# Patient Record
Sex: FEMALE | Race: WHITE | NOT HISPANIC OR LATINO | Employment: FULL TIME | URBAN - METROPOLITAN AREA
[De-identification: names, ages, dates, MRNs, and addresses within clinical notes are randomized per-mention and may not be internally consistent; named-entity substitution may affect disease eponyms.]

---

## 2023-03-21 LAB — PROGESTERONE (NG/ML) IN SER/PLAS: 9.6 NG/ML

## 2023-03-27 LAB — CHORIOGONADOTROPIN (MIU/ML) IN SER/PLAS: 395 MIU/ML

## 2023-03-29 LAB — CHORIOGONADOTROPIN (MIU/ML) IN SER/PLAS: 684 MIU/ML

## 2023-03-31 LAB — CHORIOGONADOTROPIN (MIU/ML) IN SER/PLAS: 1678 MIU/ML

## 2023-04-24 ENCOUNTER — OFFICE VISIT (OUTPATIENT)
Dept: PRIMARY CARE | Facility: CLINIC | Age: 33
End: 2023-04-24
Payer: COMMERCIAL

## 2023-04-24 VITALS
OXYGEN SATURATION: 96 % | DIASTOLIC BLOOD PRESSURE: 70 MMHG | SYSTOLIC BLOOD PRESSURE: 100 MMHG | BODY MASS INDEX: 27.04 KG/M2 | TEMPERATURE: 97.1 F | RESPIRATION RATE: 16 BRPM | HEART RATE: 85 BPM | HEIGHT: 64 IN | WEIGHT: 158.4 LBS

## 2023-04-24 DIAGNOSIS — H61.23 BILATERAL IMPACTED CERUMEN: Primary | ICD-10-CM

## 2023-04-24 PROCEDURE — 99214 OFFICE O/P EST MOD 30 MIN: CPT | Performed by: NURSE PRACTITIONER

## 2023-04-24 RX ORDER — GOLIMUMAB 100 MG/ML
100 INJECTION, SOLUTION SUBCUTANEOUS
COMMUNITY
Start: 2016-11-09

## 2023-04-24 NOTE — PROGRESS NOTES
"Subjective   Patient ID: Fang Carvalho is a 32 y.o. female who presents for Hearing Loss (Patient has a hard time hearing ears feel like there is something inside them. Patient tried a bit of oil, but has not done it since she is pregnant. Patient states symptoms began a week ago. And since then it has gotten progressively worse.).    HPI   Patient presents today for hearing loss-   Patient believes wax are occluding ears-   Patient has tried oil to help with no avail     Review of Systems   HENT:  Positive for ear pain and hearing loss.        Objective   /70 (BP Location: Left arm, Patient Position: Sitting, BP Cuff Size: Child)   Pulse 85   Temp 36.2 °C (97.1 °F)   Resp 16   Ht 1.626 m (5' 4\")   Wt 71.8 kg (158 lb 6.4 oz)   SpO2 96%   BMI 27.19 kg/m²     Physical Exam  Constitutional:       Appearance: Normal appearance.   HENT:      Right Ear: There is impacted cerumen.      Left Ear: There is impacted cerumen.   Cardiovascular:      Rate and Rhythm: Normal rate and regular rhythm.   Pulmonary:      Effort: Pulmonary effort is normal.      Breath sounds: Normal breath sounds.   Neurological:      Mental Status: She is alert.         Assessment/Plan          "

## 2023-04-26 NOTE — PATIENT INSTRUCTIONS
Patient presents today for bilateral hearing loss due to cerumen impaction-    Ears lavaged in office   Patient has improvement in hearing   Pt advised to avoid q-tips   all questions answered  follow up in office if signs or symptoms are worsening, not improving  or  please schedule follow up with PCP   if shortness of breath and or chest pain seek emergency department   24 hour hotline telephone numbers  Suicide or mental health mobile crisis help line [phone]  Child Abuse or neglect [phone]  Elder Abuse or neglect [phone]  Rape Crisis [phone]  Domestic Violence [phone]  Narcotics Anonymous [phone]

## 2023-04-28 LAB
CHLAMYDIA TRACH., AMPLIFIED: NEGATIVE
N. GONORRHEA, AMPLIFIED: NEGATIVE
URINE CULTURE: NORMAL

## 2023-05-08 LAB
ABO GROUP (TYPE) IN BLOOD: NORMAL
ANTIBODY SCREEN: NORMAL
CALCIDIOL (25 OH VITAMIN D3) (NG/ML) IN SER/PLAS: 34 NG/ML
ERYTHROCYTE DISTRIBUTION WIDTH (RATIO) BY AUTOMATED COUNT: 13.2 % (ref 11.5–14.5)
ERYTHROCYTE MEAN CORPUSCULAR HEMOGLOBIN CONCENTRATION (G/DL) BY AUTOMATED: 33.3 G/DL (ref 32–36)
ERYTHROCYTE MEAN CORPUSCULAR VOLUME (FL) BY AUTOMATED COUNT: 88 FL (ref 80–100)
ERYTHROCYTES (10*6/UL) IN BLOOD BY AUTOMATED COUNT: 4.23 X10E12/L (ref 4–5.2)
HEMATOCRIT (%) IN BLOOD BY AUTOMATED COUNT: 37.2 % (ref 36–46)
HEMOGLOBIN (G/DL) IN BLOOD: 12.4 G/DL (ref 12–16)
HEPATITIS B VIRUS SURFACE AG PRESENCE IN SERUM: NONREACTIVE
HEPATITIS C VIRUS AB PRESENCE IN SERUM: NONREACTIVE
HIV 1/ 2 AG/AB SCREEN: NONREACTIVE
LEUKOCYTES (10*3/UL) IN BLOOD BY AUTOMATED COUNT: 10 X10E9/L (ref 4.4–11.3)
PLATELETS (10*3/UL) IN BLOOD AUTOMATED COUNT: 242 X10E9/L (ref 150–450)
REFLEX ADDED, ANEMIA PANEL: NORMAL
RH FACTOR: NORMAL
SYPHILIS TOTAL AB: NONREACTIVE
THYROTROPIN (MIU/L) IN SER/PLAS BY DETECTION LIMIT <= 0.05 MIU/L: 0.03 MIU/L (ref 0.44–3.98)
THYROXINE (T4) FREE (NG/DL) IN SER/PLAS: 0.91 NG/DL (ref 0.61–1.12)

## 2023-05-09 LAB
HEMOGLOBIN A2: 3.4 %
HEMOGLOBIN A: 96 %
HEMOGLOBIN F: 0.6 %
HEMOGLOBIN IDENTIFICATION INTERPRETATION: NORMAL
PATH REVIEW-HGB IDENTIFICATION: NORMAL

## 2023-05-11 LAB — RUBELLA VIRUS IGG AB: NEGATIVE

## 2023-06-03 LAB
THYROTROPIN (MIU/L) IN SER/PLAS BY DETECTION LIMIT <= 0.05 MIU/L: 0.23 MIU/L (ref 0.44–3.98)
THYROXINE (T4) FREE (NG/DL) IN SER/PLAS: 0.88 NG/DL (ref 0.61–1.12)

## 2023-06-22 LAB — THYROTROPIN (MIU/L) IN SER/PLAS BY DETECTION LIMIT <= 0.05 MIU/L: 0.6 MIU/L (ref 0.44–3.98)

## 2023-07-21 LAB
CLUE CELLS: ABNORMAL
NUGENT SCORE: 7
YEAST: PRESENT

## 2023-08-15 LAB
CALCIDIOL (25 OH VITAMIN D3) (NG/ML) IN SER/PLAS: 33 NG/ML
ERYTHROCYTE DISTRIBUTION WIDTH (RATIO) BY AUTOMATED COUNT: 13.5 % (ref 11.5–14.5)
ERYTHROCYTE MEAN CORPUSCULAR HEMOGLOBIN CONCENTRATION (G/DL) BY AUTOMATED: 33.2 G/DL (ref 32–36)
ERYTHROCYTE MEAN CORPUSCULAR VOLUME (FL) BY AUTOMATED COUNT: 89 FL (ref 80–100)
ERYTHROCYTES (10*6/UL) IN BLOOD BY AUTOMATED COUNT: 4.19 X10E12/L (ref 4–5.2)
GLUCOSE, 1 HR SCREEN, PREG: 86 MG/DL
HEMATOCRIT (%) IN BLOOD BY AUTOMATED COUNT: 37.4 % (ref 36–46)
HEMOGLOBIN (G/DL) IN BLOOD: 12.4 G/DL (ref 12–16)
LEUKOCYTES (10*3/UL) IN BLOOD BY AUTOMATED COUNT: 13.3 X10E9/L (ref 4.4–11.3)
PLATELETS (10*3/UL) IN BLOOD AUTOMATED COUNT: 204 X10E9/L (ref 150–450)
REFLEX ADDED, ANEMIA PANEL: ABNORMAL
SYPHILIS TOTAL AB: NONREACTIVE

## 2023-08-23 LAB — FUNGAL SCREEN, YEAST: NORMAL

## 2023-09-27 ENCOUNTER — APPOINTMENT (OUTPATIENT)
Dept: PEDIATRICS | Facility: CLINIC | Age: 33
End: 2023-09-27
Payer: COMMERCIAL

## 2023-10-12 ENCOUNTER — ROUTINE PRENATAL (OUTPATIENT)
Dept: OBSTETRICS AND GYNECOLOGY | Facility: CLINIC | Age: 33
End: 2023-10-12
Payer: COMMERCIAL

## 2023-10-12 VITALS — DIASTOLIC BLOOD PRESSURE: 70 MMHG | BODY MASS INDEX: 31.07 KG/M2 | WEIGHT: 181 LBS | SYSTOLIC BLOOD PRESSURE: 110 MMHG

## 2023-10-12 DIAGNOSIS — N89.8 VAGINAL ITCHING: ICD-10-CM

## 2023-10-12 DIAGNOSIS — M45.9 ANKYLOSING SPONDYLITIS, UNSPECIFIED SITE OF SPINE (MULTI): ICD-10-CM

## 2023-10-12 DIAGNOSIS — M08.80 POLYARTICULAR JUVENILE IDIOPATHIC ARTHRITIS (MULTI): ICD-10-CM

## 2023-10-12 DIAGNOSIS — O23.593 VAGINITIS AFFECTING PREGNANCY IN THIRD TRIMESTER, ANTEPARTUM (HHS-HCC): ICD-10-CM

## 2023-10-12 DIAGNOSIS — E03.9 HYPOTHYROIDISM DURING PREGNANCY, ANTEPARTUM (HHS-HCC): Primary | ICD-10-CM

## 2023-10-12 DIAGNOSIS — O99.280 HYPOTHYROIDISM DURING PREGNANCY, ANTEPARTUM (HHS-HCC): Primary | ICD-10-CM

## 2023-10-12 DIAGNOSIS — F31.81: ICD-10-CM

## 2023-10-12 PROBLEM — E28.2 PCOS (POLYCYSTIC OVARIAN SYNDROME): Status: ACTIVE | Noted: 2023-10-12

## 2023-10-12 PROBLEM — Z15.89 HLA B27 POSITIVE: Status: ACTIVE | Noted: 2023-10-12

## 2023-10-12 PROBLEM — K21.9 GASTROESOPHAGEAL REFLUX DISEASE WITHOUT ESOPHAGITIS: Status: ACTIVE | Noted: 2023-10-12

## 2023-10-12 PROBLEM — O09.90 HIGH RISK PREGNANCY, ANTEPARTUM (HHS-HCC): Status: ACTIVE | Noted: 2023-10-12

## 2023-10-12 PROBLEM — F41.9 ANXIETY DISORDER: Status: ACTIVE | Noted: 2017-02-09

## 2023-10-12 PROBLEM — E55.9 VITAMIN D DEFICIENCY, UNSPECIFIED: Status: ACTIVE | Noted: 2023-10-12

## 2023-10-12 PROCEDURE — 0501F PRENATAL FLOW SHEET: CPT | Performed by: OBSTETRICS & GYNECOLOGY

## 2023-10-12 PROCEDURE — 87205 SMEAR GRAM STAIN: CPT

## 2023-10-12 RX ORDER — LANOLIN ALCOHOL/MO/W.PET/CERES
CREAM (GRAM) TOPICAL
Status: ON HOLD | COMMUNITY
Start: 2023-04-27 | End: 2023-10-18 | Stop reason: WASHOUT

## 2023-10-12 RX ORDER — HYDROCORTISONE 25 MG/G
OINTMENT TOPICAL
Status: ON HOLD | COMMUNITY
Start: 2023-08-15 | End: 2023-10-18 | Stop reason: WASHOUT

## 2023-10-12 RX ORDER — FAMOTIDINE 20 MG/1
1 TABLET, FILM COATED ORAL DAILY
COMMUNITY
Start: 2023-09-06 | End: 2023-11-09 | Stop reason: ALTCHOICE

## 2023-10-12 RX ORDER — B-COMPLEX WITH VITAMIN C
1 TABLET ORAL DAILY
COMMUNITY
Start: 2023-04-27 | End: 2023-12-20 | Stop reason: ALTCHOICE

## 2023-10-12 RX ORDER — VIT C/E/ZN/COPPR/LUTEIN/ZEAXAN 250MG-90MG
1 CAPSULE ORAL DAILY
Status: ON HOLD | COMMUNITY
Start: 2023-04-27 | End: 2023-10-18 | Stop reason: WASHOUT

## 2023-10-12 RX ORDER — DOXYLAMINE SUCCINATE 25 MG/1
TABLET ORAL
Status: ON HOLD | COMMUNITY
Start: 2023-04-27 | End: 2023-10-18 | Stop reason: WASHOUT

## 2023-10-12 RX ORDER — FLUCONAZOLE 150 MG/1
150 TABLET ORAL ONCE
Qty: 1 TABLET | Refills: 0 | Status: SHIPPED | OUTPATIENT
Start: 2023-10-12 | End: 2023-10-18 | Stop reason: HOSPADM

## 2023-10-12 NOTE — PROGRESS NOTES
Subjective   Patient ID [id number]   Fang Carvalho is a 33 y.o.  at 33w0d with a working estimated date of delivery of 2023, by Ultrasound who presents for a routine prenatal visit. She denies vaginal bleeding, leakage of fluid, decreased fetal movements, or contractions.    Pelvic pain improved with pregnancy belt. Endorsing itching on thighs and vulva, previously treated with Monistat but has recurred.     Objective   Physical Exam:   Weight: 82.1 kg (181 lb)  BP: 110/70  Fetal Heart Rate: 150 Fundal Height (cm): 33 cm  SSE: mild, opaque, clumpy white discharge  Skin: mild erythema of inner thighs     Assessment/Plan   Problem List Items Addressed This Visit             ICD-10-CM    Ankylosing spondylitis (CMS/[facility]) M45.9    Bipolar II disorder, severe, depressed, with mixed features, in full remission (CMS/[facility]) F31.81    Hypothyroidism during pregnancy, antepartum - Primary O99.280, E03.9     Continue levothyroxine         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  Letter written for patient's  to be able to continue virtual work from home while taking care of .    Follow up in 2 weeks for a routine prenatal visit.    Seen and discussed with Dr. Libby Sims MD

## 2023-10-13 NOTE — PROGRESS NOTES
I saw and evaluated the patient. I personally obtained the key and critical portions of the history and physical exam or was physically present for key and critical portions performed by the resident/fellow. I reviewed the resident/fellow's documentation and discussed the patient with the resident/fellow. I agree with the resident/fellow's medical decision making as documented in the note.    Chantel Souza MD

## 2023-10-15 LAB
CLUE CELLS VAG LPF-#/AREA: NORMAL /[LPF]
NUGENT SCORE: 0
YEAST VAG WET PREP-#/AREA: NORMAL

## 2023-10-18 ENCOUNTER — HOSPITAL ENCOUNTER (OUTPATIENT)
Facility: HOSPITAL | Age: 33
End: 2023-10-18
Attending: STUDENT IN AN ORGANIZED HEALTH CARE EDUCATION/TRAINING PROGRAM | Admitting: STUDENT IN AN ORGANIZED HEALTH CARE EDUCATION/TRAINING PROGRAM
Payer: COMMERCIAL

## 2023-10-18 ENCOUNTER — TELEPHONE (OUTPATIENT)
Dept: OBSTETRICS AND GYNECOLOGY | Facility: CLINIC | Age: 33
End: 2023-10-18
Payer: COMMERCIAL

## 2023-10-18 ENCOUNTER — HOSPITAL ENCOUNTER (OUTPATIENT)
Facility: HOSPITAL | Age: 33
Discharge: HOME | End: 2023-10-18
Attending: STUDENT IN AN ORGANIZED HEALTH CARE EDUCATION/TRAINING PROGRAM | Admitting: STUDENT IN AN ORGANIZED HEALTH CARE EDUCATION/TRAINING PROGRAM
Payer: COMMERCIAL

## 2023-10-18 VITALS
SYSTOLIC BLOOD PRESSURE: 120 MMHG | TEMPERATURE: 98.1 F | OXYGEN SATURATION: 98 % | HEART RATE: 78 BPM | BODY MASS INDEX: 31.73 KG/M2 | WEIGHT: 185.85 LBS | RESPIRATION RATE: 16 BRPM | DIASTOLIC BLOOD PRESSURE: 74 MMHG | HEIGHT: 64 IN

## 2023-10-18 PROCEDURE — 59025 FETAL NON-STRESS TEST: CPT

## 2023-10-18 PROCEDURE — 99214 OFFICE O/P EST MOD 30 MIN: CPT | Mod: 25

## 2023-10-18 PROCEDURE — 99214 OFFICE O/P EST MOD 30 MIN: CPT

## 2023-10-18 SDOH — HEALTH STABILITY: MENTAL HEALTH: NON-SPECIFIC ACTIVE SUICIDAL THOUGHTS (PAST 1 MONTH): NO

## 2023-10-18 SDOH — SOCIAL STABILITY: SOCIAL INSECURITY: HAS ANYONE EVER THREATENED TO HURT YOUR FAMILY OR YOUR PETS?: NO

## 2023-10-18 SDOH — SOCIAL STABILITY: SOCIAL INSECURITY: ABUSE SCREEN: ADULT

## 2023-10-18 SDOH — SOCIAL STABILITY: SOCIAL INSECURITY: ARE THERE ANY APPARENT SIGNS OF INJURIES/BEHAVIORS THAT COULD BE RELATED TO ABUSE/NEGLECT?: NO

## 2023-10-18 SDOH — HEALTH STABILITY: MENTAL HEALTH: WERE YOU ABLE TO COMPLETE ALL THE BEHAVIORAL HEALTH SCREENINGS?: YES

## 2023-10-18 SDOH — ECONOMIC STABILITY: HOUSING INSECURITY: DO YOU FEEL UNSAFE GOING BACK TO THE PLACE WHERE YOU ARE LIVING?: NO

## 2023-10-18 SDOH — SOCIAL STABILITY: SOCIAL INSECURITY: DOES ANYONE TRY TO KEEP YOU FROM HAVING/CONTACTING OTHER FRIENDS OR DOING THINGS OUTSIDE YOUR HOME?: NO

## 2023-10-18 SDOH — HEALTH STABILITY: MENTAL HEALTH: WISH TO BE DEAD (PAST 1 MONTH): NO

## 2023-10-18 SDOH — SOCIAL STABILITY: SOCIAL INSECURITY: HAVE YOU HAD THOUGHTS OF HARMING ANYONE ELSE?: NO

## 2023-10-18 SDOH — SOCIAL STABILITY: SOCIAL INSECURITY: DO YOU FEEL ANYONE HAS EXPLOITED OR TAKEN ADVANTAGE OF YOU FINANCIALLY OR OF YOUR PERSONAL PROPERTY?: NO

## 2023-10-18 SDOH — HEALTH STABILITY: MENTAL HEALTH: SUICIDAL BEHAVIOR (LIFETIME): NO

## 2023-10-18 SDOH — SOCIAL STABILITY: SOCIAL INSECURITY: VERBAL ABUSE: DENIES

## 2023-10-18 SDOH — SOCIAL STABILITY: SOCIAL INSECURITY: ARE YOU OR HAVE YOU BEEN THREATENED OR ABUSED PHYSICALLY, EMOTIONALLY, OR SEXUALLY BY ANYONE?: NO

## 2023-10-18 SDOH — SOCIAL STABILITY: SOCIAL INSECURITY: PHYSICAL ABUSE: DENIES

## 2023-10-18 ASSESSMENT — PAIN SCALES - GENERAL: PAINLEVEL_OUTOF10: 0 - NO PAIN

## 2023-10-18 ASSESSMENT — LIFESTYLE VARIABLES
HOW OFTEN DO YOU HAVE A DRINK CONTAINING ALCOHOL: NEVER
SKIP TO QUESTIONS 9-10: 1
HOW MANY STANDARD DRINKS CONTAINING ALCOHOL DO YOU HAVE ON A TYPICAL DAY: PATIENT DOES NOT DRINK
AUDIT-C TOTAL SCORE: 0
AUDIT-C TOTAL SCORE: 0
HOW OFTEN DO YOU HAVE 6 OR MORE DRINKS ON ONE OCCASION: NEVER

## 2023-10-18 ASSESSMENT — PATIENT HEALTH QUESTIONNAIRE - PHQ9
1. LITTLE INTEREST OR PLEASURE IN DOING THINGS: NOT AT ALL
2. FEELING DOWN, DEPRESSED OR HOPELESS: NOT AT ALL
SUM OF ALL RESPONSES TO PHQ9 QUESTIONS 1 & 2: 0

## 2023-10-18 NOTE — TELEPHONE ENCOUNTER
"Pt is 33.6 wks. She called stating that she has not felt much FM since bedtime last night \"and it was decreased then.\" She has had upper abdominal and lower rib cage pain. Pain is over all of area, not just RUQ. Denies LOF, ctx and bleeding. Pt said she monitored fetal movements. She usually feels the baby move a lot in 30 min. Pt states that it took about 2 hrs to feel the normal amt of movement last night. This morning pt has eaten and hydrated. She states that FM is still less than normal. Pt was advised to go to L&D for assessment.   "

## 2023-10-20 ENCOUNTER — TELEPHONE (OUTPATIENT)
Dept: OBSTETRICS AND GYNECOLOGY | Facility: CLINIC | Age: 33
End: 2023-10-20
Payer: COMMERCIAL

## 2023-10-20 NOTE — TELEPHONE ENCOUNTER
Pt contacted.       Pt c/o sporadic ctx throughout the day yesterday.   Not painful or lasting more than 30- secs.    Was seen day before that on L&D.   No PTL noted.   Pt advised to increase fluids today, rest and elevate legs.   S/s active labor discussed.   Understanding voiced.

## 2023-10-23 ENCOUNTER — TELEPHONE (OUTPATIENT)
Dept: OBSTETRICS AND GYNECOLOGY | Facility: CLINIC | Age: 33
End: 2023-10-23
Payer: COMMERCIAL

## 2023-10-23 NOTE — TELEPHONE ENCOUNTER
"Pt is 34.4 wks. She called stating that she has been having regular contractions every 10 min lasting about 1 min since 9am this morning.  FM is WNL. Denies LOF Pt has eaten and hydrated well today. She gets some relief when laying down. She is a  and sits at a desk most of the day. It is getting \"uncomfortable \"for her today. Reviewed balancing rest with activity and laying on Left side frequently if she is able. Also discussed going to L&D for assessment. Pt has a PN appt 10/26/23 and is asking to be seen sooner. She was added on to Dr. Gipson's schedule tomorrow morning. Pt said that she will go to L&D if sx continue or worsen and are not relieved by laying down. She understands TCB 24/7 with questions.   "

## 2023-10-24 ENCOUNTER — ROUTINE PRENATAL (OUTPATIENT)
Dept: OBSTETRICS AND GYNECOLOGY | Facility: CLINIC | Age: 33
End: 2023-10-24
Payer: COMMERCIAL

## 2023-10-24 VITALS — BODY MASS INDEX: 31.41 KG/M2 | WEIGHT: 183 LBS | SYSTOLIC BLOOD PRESSURE: 118 MMHG | DIASTOLIC BLOOD PRESSURE: 64 MMHG

## 2023-10-24 DIAGNOSIS — O23.593 VAGINITIS AFFECTING PREGNANCY IN THIRD TRIMESTER, ANTEPARTUM (HHS-HCC): ICD-10-CM

## 2023-10-24 DIAGNOSIS — O47.9 UTERINE CONTRACTIONS (HHS-HCC): ICD-10-CM

## 2023-10-24 DIAGNOSIS — O09.90 HIGH RISK PREGNANCY, ANTEPARTUM (HHS-HCC): ICD-10-CM

## 2023-10-24 DIAGNOSIS — O99.280 HYPOTHYROIDISM DURING PREGNANCY, ANTEPARTUM (HHS-HCC): ICD-10-CM

## 2023-10-24 DIAGNOSIS — E03.9 HYPOTHYROIDISM DURING PREGNANCY, ANTEPARTUM (HHS-HCC): ICD-10-CM

## 2023-10-24 PROCEDURE — 0501F PRENATAL FLOW SHEET: CPT | Performed by: OBSTETRICS & GYNECOLOGY

## 2023-10-24 PROCEDURE — 59025 FETAL NON-STRESS TEST: CPT | Performed by: OBSTETRICS & GYNECOLOGY

## 2023-10-24 NOTE — PROCEDURES
Fang Carvalho, a  at 34w5d with an ALEXANDER of 2023, by Ultrasound, was seen at [facility] in [place] for a nonstress test.    Non-Stress Test   Baseline Fetal Heart Rate for Non-Stress Test: 160 BPM  Variability in Waveform for Non-Stress Test: Moderate  Accelerations in Non-Stress Test: Yes, greater than/equal to 15 bpm  Decelerations in Non-Stress Test: None  Contractions in Non-Stress Test: Irregular  Acoustic Stimulator for Non-Stress Test: Yes  Interpretation of Non-Stress Test   Interpretation of Non-Stress Test: Reactive

## 2023-10-24 NOTE — PROGRESS NOTES
Patient patient was seen and labor and delivery last Tuesday for contractions  NST unremarkable  Cervix was noted to be 1 and 50%  She called the office yesterday and was having increasing contractions  Good fetal movement, no bleeding  Chart reviewed and labs up-to-date    Ankylosing spondylitis stable    Cervix slightly more dilated today at 2 and 80%, vertex, 0 station  Patient reassured that although she is making cervical change we would not make attempts to stop her labor at this point.  We will do NST today and continue with weekly visits  Patient to call for any ROM, concerning bleeding, or decreased fetal movement

## 2023-10-26 ENCOUNTER — APPOINTMENT (OUTPATIENT)
Dept: OBSTETRICS AND GYNECOLOGY | Facility: CLINIC | Age: 33
End: 2023-10-26
Payer: COMMERCIAL

## 2023-10-31 ENCOUNTER — ROUTINE PRENATAL (OUTPATIENT)
Dept: OBSTETRICS AND GYNECOLOGY | Facility: CLINIC | Age: 33
End: 2023-10-31
Payer: COMMERCIAL

## 2023-10-31 VITALS — BODY MASS INDEX: 31.41 KG/M2 | SYSTOLIC BLOOD PRESSURE: 118 MMHG | DIASTOLIC BLOOD PRESSURE: 62 MMHG | WEIGHT: 183 LBS

## 2023-10-31 DIAGNOSIS — E03.9 HYPOTHYROIDISM DURING PREGNANCY, ANTEPARTUM (HHS-HCC): ICD-10-CM

## 2023-10-31 DIAGNOSIS — O99.280 HYPOTHYROIDISM DURING PREGNANCY, ANTEPARTUM (HHS-HCC): ICD-10-CM

## 2023-10-31 DIAGNOSIS — O23.593 VAGINITIS AFFECTING PREGNANCY IN THIRD TRIMESTER, ANTEPARTUM (HHS-HCC): ICD-10-CM

## 2023-10-31 DIAGNOSIS — O09.90 HIGH RISK PREGNANCY, ANTEPARTUM (HHS-HCC): ICD-10-CM

## 2023-10-31 PROCEDURE — 0501F PRENATAL FLOW SHEET: CPT | Performed by: OBSTETRICS & GYNECOLOGY

## 2023-10-31 NOTE — LETTER
To Whom it may concern,    It is my recommendation that my patient Fang work from home for the remainder of her pregnancy.  If you have any questions or concerns please contact my office.  [phone]        Thank You,  Silvia Gipson MD

## 2023-11-02 ENCOUNTER — TELEPHONE (OUTPATIENT)
Dept: OBSTETRICS AND GYNECOLOGY | Facility: CLINIC | Age: 33
End: 2023-11-02
Payer: COMMERCIAL

## 2023-11-02 NOTE — TELEPHONE ENCOUNTER
Pt contacted.     Has been taking pepsi and liquid mylanta for heart burn.    Usually works.     Last night she endorses vomiting after eating chips and salsa x1.        Feels ok today.  Discussed eating more bland foods in the evening and can try taking omeprazole.

## 2023-11-06 ENCOUNTER — TELEPHONE (OUTPATIENT)
Dept: OBSTETRICS AND GYNECOLOGY | Facility: CLINIC | Age: 33
End: 2023-11-06
Payer: COMMERCIAL

## 2023-11-06 NOTE — TELEPHONE ENCOUNTER
"Pt is 36.4 wks. Next PN appt is 11/9/23. Pt states that she has had lower back discomfort \"like a spasm that won't release\" on and off since last night. It moves to her abd when she sits up. FM WNL, no lof or ctx. She has been using a heating pad, massage to the area and resting today. She has not tried tylenol d/t recent heartburn and does not want that to come back. Discussed tylenol, hydration balancing rest with activity, laying on left side and stretching. Enc wearing maternity support band. Pt will monitor and CB 24/7 with questions or worsening sx.   "

## 2023-11-09 ENCOUNTER — ROUTINE PRENATAL (OUTPATIENT)
Dept: OBSTETRICS AND GYNECOLOGY | Facility: CLINIC | Age: 33
End: 2023-11-09
Payer: COMMERCIAL

## 2023-11-09 VITALS — SYSTOLIC BLOOD PRESSURE: 112 MMHG | DIASTOLIC BLOOD PRESSURE: 70 MMHG | WEIGHT: 184 LBS | BODY MASS INDEX: 31.58 KG/M2

## 2023-11-09 DIAGNOSIS — O99.280 HYPOTHYROIDISM DURING PREGNANCY, ANTEPARTUM (HHS-HCC): ICD-10-CM

## 2023-11-09 DIAGNOSIS — O23.593 VAGINITIS AFFECTING PREGNANCY IN THIRD TRIMESTER, ANTEPARTUM (HHS-HCC): ICD-10-CM

## 2023-11-09 DIAGNOSIS — O09.90 HIGH RISK PREGNANCY, ANTEPARTUM (HHS-HCC): ICD-10-CM

## 2023-11-09 DIAGNOSIS — E03.9 HYPOTHYROIDISM DURING PREGNANCY, ANTEPARTUM (HHS-HCC): ICD-10-CM

## 2023-11-09 PROCEDURE — 87081 CULTURE SCREEN ONLY: CPT

## 2023-11-09 PROCEDURE — 0501F PRENATAL FLOW SHEET: CPT | Performed by: OBSTETRICS & GYNECOLOGY

## 2023-11-09 RX ORDER — OMEPRAZOLE 20 MG/1
20 TABLET, DELAYED RELEASE ORAL
COMMUNITY
End: 2023-12-20 | Stop reason: ALTCHOICE

## 2023-11-09 NOTE — PROGRESS NOTES
Subjective   Patient ID [id number]   Fang Carvalho is a 33 y.o.  at 37w0d with a working estimated date of delivery of 2023, by Ultrasound who presents for a routine prenatal visit. She denies vaginal bleeding, leakage of fluid, decreased fetal movements, or contractions.  Low back pain for 3 days.    Her pregnancy is complicated by:  Ankylosing  spondylitis    Objective   Physical Exam:   Weight: 83.5 kg (184 lb)  Expected Total Weight Gain: 7 kg (15 lb)-11.5 kg (25 lb)   [place]regravid BMI: 26.76  BP: 112/70  Fetal Heart Rate: 125   Presentation: Vertex  Dilation: 3 Effacement (%): 80 Fetal Station: -2    Lab Results   Component Value Date    HGB 12.4 08/15/2023    HCT 37.4 08/15/2023    ABO A 2023    HEPBSAG NONREACTIVE 2023          Assessment/Plan   Problem List Items Addressed This Visit             ICD-10-CM    High risk pregnancy, antepartum O09.90    Relevant Orders    Group B Streptococcus (GBS) Prenatal Screen, Culture    Hypothyroidism during pregnancy, antepartum O99.280, E03.9    Vaginitis affecting pregnancy in third trimester, antepartum O23.593     GBS taken.  Expected mode of delivery vaginal  Follow up in 1 week for a routine prenatal visit.

## 2023-11-11 LAB — GP B STREP GENITAL QL CULT: ABNORMAL

## 2023-11-12 ENCOUNTER — HOSPITAL ENCOUNTER (INPATIENT)
Facility: HOSPITAL | Age: 33
LOS: 3 days | Discharge: HOME | End: 2023-11-15
Attending: OBSTETRICS & GYNECOLOGY | Admitting: STUDENT IN AN ORGANIZED HEALTH CARE EDUCATION/TRAINING PROGRAM
Payer: COMMERCIAL

## 2023-11-12 ENCOUNTER — ANESTHESIA (OUTPATIENT)
Dept: OBSTETRICS AND GYNECOLOGY | Facility: HOSPITAL | Age: 33
End: 2023-11-12
Payer: COMMERCIAL

## 2023-11-12 ENCOUNTER — ANESTHESIA EVENT (OUTPATIENT)
Dept: OBSTETRICS AND GYNECOLOGY | Facility: HOSPITAL | Age: 33
End: 2023-11-12
Payer: COMMERCIAL

## 2023-11-12 DIAGNOSIS — Z30.011 ENCOUNTER FOR INITIAL PRESCRIPTION OF CONTRACEPTIVE PILLS: ICD-10-CM

## 2023-11-12 PROBLEM — G89.29 CHRONIC LOW BACK PAIN: Status: ACTIVE | Noted: 2023-11-12

## 2023-11-12 PROBLEM — Z28.39 RUBELLA NON-IMMUNE STATUS, ANTEPARTUM (HHS-HCC): Status: ACTIVE | Noted: 2023-11-12

## 2023-11-12 PROBLEM — M54.50 CHRONIC LOW BACK PAIN: Status: ACTIVE | Noted: 2023-11-12

## 2023-11-12 PROBLEM — M45.6 ANKYLOSING SPONDYLITIS OF LUMBAR REGION (MULTI): Status: ACTIVE | Noted: 2023-10-12

## 2023-11-12 PROBLEM — O09.899 RUBELLA NON-IMMUNE STATUS, ANTEPARTUM (HHS-HCC): Status: ACTIVE | Noted: 2023-11-12

## 2023-11-12 LAB
ABO GROUP (TYPE) IN BLOOD: NORMAL
ANTIBODY SCREEN: NORMAL
ERYTHROCYTE [DISTWIDTH] IN BLOOD BY AUTOMATED COUNT: 13.4 % (ref 11.5–14.5)
HCT VFR BLD AUTO: 39.4 % (ref 36–46)
HGB BLD-MCNC: 12.7 G/DL (ref 12–16)
MCH RBC QN AUTO: 29 PG (ref 26–34)
MCHC RBC AUTO-ENTMCNC: 32.2 G/DL (ref 32–36)
MCV RBC AUTO: 90 FL (ref 80–100)
NRBC BLD-RTO: 0 /100 WBCS (ref 0–0)
PLATELET # BLD AUTO: 209 X10*3/UL (ref 150–450)
RBC # BLD AUTO: 4.38 X10*6/UL (ref 4–5.2)
RH FACTOR (ANTIGEN D): NORMAL
T PALLIDUM AB SER QL: NONREACTIVE
WBC # BLD AUTO: 14.9 X10*3/UL (ref 4.4–11.3)

## 2023-11-12 PROCEDURE — 3700000001 HC GENERAL ANESTHESIA TIME - INITIAL BASE CHARGE

## 2023-11-12 PROCEDURE — 99214 OFFICE O/P EST MOD 30 MIN: CPT | Mod: 25

## 2023-11-12 PROCEDURE — 1120000001 HC OB PRIVATE ROOM DAILY

## 2023-11-12 PROCEDURE — 85027 COMPLETE CBC AUTOMATED: CPT

## 2023-11-12 PROCEDURE — 2500000004 HC RX 250 GENERAL PHARMACY W/ HCPCS (ALT 636 FOR OP/ED)

## 2023-11-12 PROCEDURE — 3700000002 HC GENERAL ANESTHESIA TIME - EACH INCREMENTAL 1 MINUTE

## 2023-11-12 PROCEDURE — 36415 COLL VENOUS BLD VENIPUNCTURE: CPT

## 2023-11-12 PROCEDURE — 2500000005 HC RX 250 GENERAL PHARMACY W/O HCPCS

## 2023-11-12 PROCEDURE — 86920 COMPATIBILITY TEST SPIN: CPT

## 2023-11-12 PROCEDURE — 2500000001 HC RX 250 WO HCPCS SELF ADMINISTERED DRUGS (ALT 637 FOR MEDICARE OP)

## 2023-11-12 PROCEDURE — 0HQ9XZZ REPAIR PERINEUM SKIN, EXTERNAL APPROACH: ICD-10-PCS | Performed by: OBSTETRICS & GYNECOLOGY

## 2023-11-12 PROCEDURE — 86901 BLOOD TYPING SEROLOGIC RH(D): CPT

## 2023-11-12 PROCEDURE — 94760 N-INVAS EAR/PLS OXIMETRY 1: CPT

## 2023-11-12 PROCEDURE — 86780 TREPONEMA PALLIDUM: CPT

## 2023-11-12 RX ORDER — PANTOPRAZOLE SODIUM 20 MG/1
20 TABLET, DELAYED RELEASE ORAL DAILY PRN
Status: DISCONTINUED | OUTPATIENT
Start: 2023-11-12 | End: 2023-11-15 | Stop reason: HOSPADM

## 2023-11-12 RX ORDER — ESOMEPRAZOLE MAGNESIUM 40 MG/1
40 GRANULE, DELAYED RELEASE ORAL ONCE
Status: DISCONTINUED | OUTPATIENT
Start: 2023-11-12 | End: 2023-11-15 | Stop reason: HOSPADM

## 2023-11-12 RX ORDER — LABETALOL HYDROCHLORIDE 5 MG/ML
20 INJECTION, SOLUTION INTRAVENOUS ONCE AS NEEDED
Status: DISCONTINUED | OUTPATIENT
Start: 2023-11-12 | End: 2023-11-13 | Stop reason: SDUPTHER

## 2023-11-12 RX ORDER — OXYTOCIN/0.9 % SODIUM CHLORIDE 30/500 ML
60 PLASTIC BAG, INJECTION (ML) INTRAVENOUS ONCE AS NEEDED
Status: DISCONTINUED | OUTPATIENT
Start: 2023-11-12 | End: 2023-11-13

## 2023-11-12 RX ORDER — ONDANSETRON HYDROCHLORIDE 2 MG/ML
4 INJECTION, SOLUTION INTRAVENOUS EVERY 6 HOURS PRN
Status: DISCONTINUED | OUTPATIENT
Start: 2023-11-12 | End: 2023-11-13 | Stop reason: SDUPTHER

## 2023-11-12 RX ORDER — SODIUM CHLORIDE, SODIUM LACTATE, POTASSIUM CHLORIDE, CALCIUM CHLORIDE 600; 310; 30; 20 MG/100ML; MG/100ML; MG/100ML; MG/100ML
125 INJECTION, SOLUTION INTRAVENOUS CONTINUOUS
Status: DISCONTINUED | OUTPATIENT
Start: 2023-11-12 | End: 2023-11-13

## 2023-11-12 RX ORDER — OXYTOCIN/0.9 % SODIUM CHLORIDE 30/500 ML
2-30 PLASTIC BAG, INJECTION (ML) INTRAVENOUS CONTINUOUS
Status: DISCONTINUED | OUTPATIENT
Start: 2023-11-12 | End: 2023-11-15 | Stop reason: HOSPADM

## 2023-11-12 RX ORDER — METOCLOPRAMIDE HYDROCHLORIDE 5 MG/ML
10 INJECTION INTRAMUSCULAR; INTRAVENOUS EVERY 6 HOURS PRN
Status: DISCONTINUED | OUTPATIENT
Start: 2023-11-12 | End: 2023-11-13

## 2023-11-12 RX ORDER — ONDANSETRON 4 MG/1
4 TABLET, FILM COATED ORAL EVERY 6 HOURS PRN
Status: DISCONTINUED | OUTPATIENT
Start: 2023-11-12 | End: 2023-11-13 | Stop reason: SDUPTHER

## 2023-11-12 RX ORDER — MISOPROSTOL 200 UG/1
800 TABLET ORAL ONCE AS NEEDED
Status: COMPLETED | OUTPATIENT
Start: 2023-11-12 | End: 2023-11-13

## 2023-11-12 RX ORDER — METOCLOPRAMIDE 10 MG/1
10 TABLET ORAL EVERY 6 HOURS PRN
Status: DISCONTINUED | OUTPATIENT
Start: 2023-11-12 | End: 2023-11-13

## 2023-11-12 RX ORDER — TRANEXAMIC ACID 100 MG/ML
1000 INJECTION, SOLUTION INTRAVENOUS ONCE AS NEEDED
Status: DISCONTINUED | OUTPATIENT
Start: 2023-11-12 | End: 2023-11-13 | Stop reason: SDUPTHER

## 2023-11-12 RX ORDER — CARBOPROST TROMETHAMINE 250 UG/ML
250 INJECTION, SOLUTION INTRAMUSCULAR ONCE AS NEEDED
Status: DISCONTINUED | OUTPATIENT
Start: 2023-11-12 | End: 2023-11-13

## 2023-11-12 RX ORDER — NIFEDIPINE 10 MG/1
10 CAPSULE ORAL ONCE AS NEEDED
Status: DISCONTINUED | OUTPATIENT
Start: 2023-11-12 | End: 2023-11-13 | Stop reason: SDUPTHER

## 2023-11-12 RX ORDER — LIDOCAINE HYDROCHLORIDE 10 MG/ML
30 INJECTION INFILTRATION; PERINEURAL ONCE AS NEEDED
Status: DISCONTINUED | OUTPATIENT
Start: 2023-11-12 | End: 2023-11-13

## 2023-11-12 RX ORDER — PANTOPRAZOLE SODIUM 40 MG/10ML
40 INJECTION, POWDER, LYOPHILIZED, FOR SOLUTION INTRAVENOUS ONCE
Status: DISCONTINUED | OUTPATIENT
Start: 2023-11-12 | End: 2023-11-15 | Stop reason: HOSPADM

## 2023-11-12 RX ORDER — OXYTOCIN 10 [USP'U]/ML
10 INJECTION, SOLUTION INTRAMUSCULAR; INTRAVENOUS ONCE AS NEEDED
Status: DISCONTINUED | OUTPATIENT
Start: 2023-11-12 | End: 2023-11-13

## 2023-11-12 RX ORDER — TERBUTALINE SULFATE 1 MG/ML
0.25 INJECTION SUBCUTANEOUS ONCE AS NEEDED
Status: DISCONTINUED | OUTPATIENT
Start: 2023-11-12 | End: 2023-11-13

## 2023-11-12 RX ORDER — METHYLERGONOVINE MALEATE 0.2 MG/ML
0.2 INJECTION INTRAVENOUS ONCE AS NEEDED
Status: COMPLETED | OUTPATIENT
Start: 2023-11-12 | End: 2023-11-13

## 2023-11-12 RX ORDER — PANTOPRAZOLE SODIUM 40 MG/1
40 TABLET, DELAYED RELEASE ORAL ONCE
Status: DISCONTINUED | OUTPATIENT
Start: 2023-11-12 | End: 2023-11-15 | Stop reason: HOSPADM

## 2023-11-12 RX ORDER — PENICILLIN G 3000000 [IU]/50ML
3 INJECTION, SOLUTION INTRAVENOUS EVERY 4 HOURS
Status: DISCONTINUED | OUTPATIENT
Start: 2023-11-12 | End: 2023-11-13

## 2023-11-12 RX ORDER — HYDRALAZINE HYDROCHLORIDE 20 MG/ML
5 INJECTION INTRAMUSCULAR; INTRAVENOUS ONCE AS NEEDED
Status: DISCONTINUED | OUTPATIENT
Start: 2023-11-12 | End: 2023-11-13 | Stop reason: SDUPTHER

## 2023-11-12 RX ORDER — LOPERAMIDE HYDROCHLORIDE 2 MG/1
4 CAPSULE ORAL EVERY 2 HOUR PRN
Status: DISCONTINUED | OUTPATIENT
Start: 2023-11-12 | End: 2023-11-13 | Stop reason: SDUPTHER

## 2023-11-12 RX ADMIN — PENICILLIN G 3 MILLION UNITS: 3000000 INJECTION, SOLUTION INTRAVENOUS at 21:42

## 2023-11-12 RX ADMIN — DIPHENHYDRAMINE HYDROCHLORIDE AND LIDOCAINE HYDROCHLORIDE AND ALUMINUM HYDROXIDE AND MAGNESIUM HYDRO 10 ML: KIT at 22:17

## 2023-11-12 RX ADMIN — SODIUM CHLORIDE, POTASSIUM CHLORIDE, SODIUM LACTATE AND CALCIUM CHLORIDE 125 ML/HR: 600; 310; 30; 20 INJECTION, SOLUTION INTRAVENOUS at 23:23

## 2023-11-12 RX ADMIN — OXYTOCIN-SODIUM CHLORIDE 0.9% IV SOLN 30 UNIT/500ML 2 MILLI-UNITS/MIN: 30-0.9/5 SOLUTION at 17:59

## 2023-11-12 RX ADMIN — CARIPRAZINE 3 MG: 3 CAPSULE, GELATIN COATED ORAL at 22:18

## 2023-11-12 RX ADMIN — SODIUM CHLORIDE, POTASSIUM CHLORIDE, SODIUM LACTATE AND CALCIUM CHLORIDE 125 ML/HR: 600; 310; 30; 20 INJECTION, SOLUTION INTRAVENOUS at 14:00

## 2023-11-12 RX ADMIN — ONDANSETRON 4 MG: 2 INJECTION INTRAMUSCULAR; INTRAVENOUS at 23:34

## 2023-11-12 RX ADMIN — PENICILLIN G POTASSIUM 5 MILLION UNITS: 5000000 INJECTION, POWDER, FOR SOLUTION INTRAMUSCULAR; INTRAVENOUS at 14:19

## 2023-11-12 RX ADMIN — PENICILLIN G 3 MILLION UNITS: 3000000 INJECTION, SOLUTION INTRAVENOUS at 17:55

## 2023-11-12 SDOH — SOCIAL STABILITY: SOCIAL INSECURITY: DO YOU FEEL ANYONE HAS EXPLOITED OR TAKEN ADVANTAGE OF YOU FINANCIALLY OR OF YOUR PERSONAL PROPERTY?: NO

## 2023-11-12 SDOH — HEALTH STABILITY: MENTAL HEALTH: NON-SPECIFIC ACTIVE SUICIDAL THOUGHTS (PAST 1 MONTH): NO

## 2023-11-12 SDOH — SOCIAL STABILITY: SOCIAL INSECURITY: ABUSE SCREEN: ADULT

## 2023-11-12 SDOH — HEALTH STABILITY: MENTAL HEALTH: HAVE YOU USED ANY PRESCRIPTION DRUGS OTHER THAN PRESCRIBED IN THE PAST 12 MONTHS?: NO

## 2023-11-12 SDOH — SOCIAL STABILITY: SOCIAL INSECURITY: HAS ANYONE EVER THREATENED TO HURT YOUR FAMILY OR YOUR PETS?: NO

## 2023-11-12 SDOH — ECONOMIC STABILITY: HOUSING INSECURITY: DO YOU FEEL UNSAFE GOING BACK TO THE PLACE WHERE YOU ARE LIVING?: NO

## 2023-11-12 SDOH — SOCIAL STABILITY: SOCIAL INSECURITY: PHYSICAL ABUSE: DENIES

## 2023-11-12 SDOH — SOCIAL STABILITY: SOCIAL INSECURITY: DOES ANYONE TRY TO KEEP YOU FROM HAVING/CONTACTING OTHER FRIENDS OR DOING THINGS OUTSIDE YOUR HOME?: NO

## 2023-11-12 SDOH — HEALTH STABILITY: MENTAL HEALTH: WISH TO BE DEAD (PAST 1 MONTH): NO

## 2023-11-12 SDOH — HEALTH STABILITY: MENTAL HEALTH: WERE YOU ABLE TO COMPLETE ALL THE BEHAVIORAL HEALTH SCREENINGS?: YES

## 2023-11-12 SDOH — SOCIAL STABILITY: SOCIAL INSECURITY: ARE YOU OR HAVE YOU BEEN THREATENED OR ABUSED PHYSICALLY, EMOTIONALLY, OR SEXUALLY BY ANYONE?: NO

## 2023-11-12 SDOH — HEALTH STABILITY: MENTAL HEALTH: CURRENT SMOKER: 0

## 2023-11-12 SDOH — HEALTH STABILITY: MENTAL HEALTH: HAVE YOU USED ANY SUBSTANCES (CANABIS, COCAINE, HEROIN, HALLUCINOGENS, INHALANTS, ETC.) IN THE PAST 12 MONTHS?: NO

## 2023-11-12 SDOH — HEALTH STABILITY: MENTAL HEALTH: SUICIDAL BEHAVIOR (LIFETIME): NO

## 2023-11-12 SDOH — SOCIAL STABILITY: SOCIAL INSECURITY: ARE THERE ANY APPARENT SIGNS OF INJURIES/BEHAVIORS THAT COULD BE RELATED TO ABUSE/NEGLECT?: NO

## 2023-11-12 SDOH — SOCIAL STABILITY: SOCIAL INSECURITY: VERBAL ABUSE: DENIES

## 2023-11-12 SDOH — SOCIAL STABILITY: SOCIAL INSECURITY: HAVE YOU HAD THOUGHTS OF HARMING ANYONE ELSE?: NO

## 2023-11-12 ASSESSMENT — LIFESTYLE VARIABLES
HOW MANY STANDARD DRINKS CONTAINING ALCOHOL DO YOU HAVE ON A TYPICAL DAY: PATIENT DOES NOT DRINK
AUDIT-C TOTAL SCORE: 0
HOW OFTEN DO YOU HAVE 6 OR MORE DRINKS ON ONE OCCASION: NEVER
AUDIT-C TOTAL SCORE: 0
SKIP TO QUESTIONS 9-10: 1
HOW MANY STANDARD DRINKS CONTAINING ALCOHOL DO YOU HAVE ON A TYPICAL DAY: PATIENT DOES NOT DRINK
HOW OFTEN DO YOU HAVE A DRINK CONTAINING ALCOHOL: NEVER
HOW OFTEN DO YOU HAVE A DRINK CONTAINING ALCOHOL: NEVER
AUDIT-C TOTAL SCORE: 0
AUDIT-C TOTAL SCORE: 0
HOW OFTEN DO YOU HAVE 6 OR MORE DRINKS ON ONE OCCASION: NEVER
SKIP TO QUESTIONS 9-10: 1

## 2023-11-12 ASSESSMENT — PATIENT HEALTH QUESTIONNAIRE - PHQ9
2. FEELING DOWN, DEPRESSED OR HOPELESS: NOT AT ALL
SUM OF ALL RESPONSES TO PHQ9 QUESTIONS 1 & 2: 0
1. LITTLE INTEREST OR PLEASURE IN DOING THINGS: NOT AT ALL

## 2023-11-12 ASSESSMENT — PAIN SCALES - GENERAL
PAINLEVEL_OUTOF10: 0 - NO PAIN
PAINLEVEL_OUTOF10: 0 - NO PAIN
PAINLEVEL: 0 - NO PAIN
PAINLEVEL_OUTOF10: 2
PAINLEVEL_OUTOF10: 0 - NO PAIN
PAINLEVEL_OUTOF10: 2
PAINLEVEL_OUTOF10: 2
PAINLEVEL_OUTOF10: 0 - NO PAIN

## 2023-11-12 ASSESSMENT — ACTIVITIES OF DAILY LIVING (ADL): LACK_OF_TRANSPORTATION: NO

## 2023-11-12 NOTE — H&P
Obstetrical Admission History and Physical     Fang Carvalho is a 33 y.o.  at 37w3d. ALEXANDER: 2023, by 6.4 wk Ultrasound. She has had prenatal care with Dr. Souza .    Chief Complaint: Contractions and LOF    Assessment/Plan      Labor  - Regular, painful CTXs Q 7-8 mins  - SVE 5/70/-1  - SSE + pooling, + nitrazine, fetal hair visualized  - Admit to L&D for labor  - Consented and scanned by Dr. Plata  - Monitor VS per unit protocol  - Routine labs ordered   - Encourage frequent position changes  - Clear liquid diet  - Continuous fetal monitoring  - Pain management per pt request  - Continue assessment of maternal and fetal well being  - Recheck as clinically indicated by maternal and/or fetal status  - Will AROM and/or start Pitocin for labor augmentation as needed  - Anticipate SVB    Maternal Well-being  - Vital signs stable and WNL  - Emotional support and reassurance provided  - All questions and concerns addressed'    IUP  - prenatal lab work reviewed and wnl  - Rubella non-immune  - Cat I tracing  - GBS positive, PCN per protocol    - Dr. Carroll aware of admission. Report to Dr. Plata for continuation of care    OSBALDO De Anda-CNP    Principal Problem:    Labor and delivery indication for care or intervention      Pregnancy Problems (from 23 to present)       Problem Noted Resolved    Labor and delivery indication for care or intervention 2023 by Fabi Plata MD No    Priority:  Medium      Rubella non-immune status, antepartum 2023 by Rosenda Plummer APRN-CNP No    Priority:  Medium      Ankylosing spondylitis (CMS/HCC) 10/12/2023 by Farzana Sims MD No    Priority:  Medium      High risk pregnancy, antepartum 10/12/2023 by Farzana Sims MD No    Priority:  Medium      Hypothyroidism during pregnancy, antepartum 10/12/2023 by Farzana Sims MD No    Priority:  Medium      PCOS (polycystic ovarian syndrome) 10/12/2023 by Farzana Sims MD No    Priority:   Medium      Polyarticular juvenile idiopathic arthritis (CMS/[facility]) 10/12/2023 by Farzana Sims MD No    Priority:  Medium      Anxiety disorder 2017 by Farzana Sims MD No    Priority:  Medium      Bipolar II disorder, severe, depressed, with mixed features, in full remission (CMS/[facility]) 2016 by Farzana Sims MD No    Priority:  Medium            Options for delivery have been discussed with the patient per Dr. Plata and she elects a vaginal delivery.  Labor has been discussed in detail. The risks, benefits, complications, alternatives, expected outcomes, potential problems during recuperation and recovery, and the risks of not performing the procedure were discussed with the patient. The patient stated understanding that the risks of delivery include, but are not limited to: death; reaction to medications; injury to bowel, bladder, ureters, uterus, cervix, vagina, and other pelvic and abdominal structures, infection; blood loss and possible need for transfusion; and potential need for surgery, including hysterectomy. The risks of injury to the infant during delivery were also discussed. All questions were answered. There was concurrence with the planned procedure, and the patient wanted to proceed.    Admit to inpatient status. I anticipate that this patient will require a stay exceeding at least 2 midnights for delivery and postpartum care.  Active management of labor.  Management of pregnancy complications, as indicated.    Subjective   Fang is here with irregular painful contractions and loss of clear fluid starting at 0300. She reports a sticky discharge starting on Friday, then woke up at 0300 to clear fluid. She has continued to leak fluid throughout the day and has been changing pads. She reports contractions are less uncomfortable than earlier today but still persistent. She denies any VB and reports good FM.       Obstetrical History   OB History    Para Term  AB Living    1 0 0 0 0 0   SAB IAB Ectopic Multiple Live Births   0 0 0 0 0      # Outcome Date GA Lbr Uziel/2nd Weight Sex Delivery Anes PTL Lv   1 Current                Past Medical History  Past Medical History:   Diagnosis Date    Ankylosing spondylitis (CMS/[facility])     Bipolar disorder, unspecified (CMS/HCC) 01/05/2023    Bipolar 1 disorder    GERD without esophagitis     Hyperthyroidism complicating pregnancy     Long term (current) use of immunosuppressive biologic 12/30/2015    Adalimumab (Humira) long-term use    Personal history of other diseases of the musculoskeletal system and connective tissue 01/05/2023    History of ankylosing spondylitis    Polycystic ovarian syndrome 01/05/2023    PCOS (polycystic ovarian syndrome)    Raynaud's syndrome     Raynaud's syndrome without gangrene     Raynaud's phenomenon without gangrene        Past Surgical History   Past Surgical History:   Procedure Laterality Date    OTHER SURGICAL HISTORY  01/05/2023    Femur fracture repair       Social History  Social History     Tobacco Use    Smoking status: Never    Smokeless tobacco: Never   Substance Use Topics    Alcohol use: Not Currently     Substance and Sexual Activity   Drug Use Never       Allergies  Biaxin [clarithromycin]     Medications  Medications Prior to Admission   Medication Sig Dispense Refill Last Dose    cariprazine (Vraylar) 3 mg capsule Take 1 capsule (3 mg) by mouth once daily.       omeprazole OTC (PriLOSEC OTC) 20 mg EC tablet Take 1 tablet (20 mg) by mouth once daily in the morning. Take before meals. Do not crush, chew, or split.       prenatal vitamin, iron-folic, (Prenatal Vitamin) 27 mg iron-800 mcg folic acid tablet Take 1 tablet by mouth once daily.       Simponi 100 mg/mL syringe Inject 1 mL (100 mg) under the skin every 30 (thirty) days.          Objective    Last Vitals  Temp Pulse Resp BP MAP O2 Sat   36.5 °C (97.7 °F) 86 18 126/71         Physical Examination  Physical Exam  Constitutional:        Appearance: Normal appearance.   HENT:      Head: Normocephalic and atraumatic.   Eyes:      Pupils: Pupils are equal, round, and reactive to light.      Comments: Left eye drooping- not new to pt  Normal smile and facial movements   Pulmonary:      Effort: Pulmonary effort is normal.   Abdominal:      Palpations: Abdomen is soft.      Tenderness: There is no abdominal tenderness.   Genitourinary:     Comments: SSE + pooling, + nitrazine, fetal hair visible  SVE 5/70/-1  Musculoskeletal:         General: Normal range of motion.      Cervical back: Normal range of motion.   Skin:     General: Skin is warm and dry.   Neurological:      General: No focal deficit present.      Mental Status: She is alert and oriented to person, place, and time.   Psychiatric:         Mood and Affect: Mood normal.         Behavior: Behavior normal.       Lab Review  Admission labs pending

## 2023-11-12 NOTE — INDIVIDUALIZED OVERALL PLAN OF CARE NOTE
Patient resting comfortably in bed. Amenable to SVE to check for cervical change    Cervical Exam  Dilation: 5  Effacement (%): 70  Fetal Station: -2  Method: Manual  OB Examiner: MD Boni  Fetal Assessment  Movement: Present  Mode: External US  Baseline Fetal Heart Rate (bpm): 135 bpm  Baseline Classification: Normal  Variability: Moderate (Between 6 and 25 BPM)  Pattern: Accelerations  Multiple Births: No   Fetal Decelerations: No  Contraction Frequency: 5-10    A/P:     Labor  - No cervical change on repeat SVE after 5 hours. Discussed options for starting pitocin to increase frequency of contractions. Patient amenable.   - Will start and titrate pitocin per protocol  - Epidural/nubain for pain management   - CEFM; Cat 1 currently  - GBS+, PCN    Fabi Plata MD

## 2023-11-12 NOTE — ANESTHESIA PREPROCEDURE EVALUATION
Patient: Fang Carvalho    Evaluation Method: In-person visit    Procedure Information    Date: 11/12/23  Procedure: Labor Analgesia         Relevant Problems   Anesthesia (within normal limits)      Cardiovascular (within normal limits)      Endocrine   (+) Hypothyroidism during pregnancy, antepartum      GI   (+) Gastroesophageal reflux disease without esophagitis      /Renal (within normal limits)      Neuro/Psych   (+) Anxiety disorder   (+) Bipolar II disorder, severe, depressed, with mixed features, in full remission (CMS/HCC)      Pulmonary (within normal limits)      GI/Hepatic (within normal limits)      Hematology (within normal limits)      Musculoskeletal   (+) Chronic low back pain      Eyes, Ears, Nose, and Throat (within normal limits)      Infectious Disease (within normal limits)      Other   (+) Ankylosing spondylitis of lumbar region (CMS/HCC)   (+) Polyarticular juvenile idiopathic arthritis (CMS/HCC)       Clinical information reviewed:   Tobacco  Allergies  Meds  Problems  Med Hx  Surg Hx   Fam Hx          NPO Detail:  No data recorded     OB/Gyn Evaluation    Present Pregnancy    Patient is pregnant now.   Obstetric History                Physical Exam    Airway  Mallampati: III  TM distance: >3 FB  Neck ROM: full     Cardiovascular   Rhythm: regular  Rate: normal     Dental - normal exam     Pulmonary - normal exam     Abdominal            Anesthesia Plan    ASA 2     epidural     The patient is not a current smoker.  Patient did not smoke on day of procedure.    Anesthetic plan and risks discussed with patient.  Use of blood products discussed with patient who.    Plan discussed with CRNA and attending.

## 2023-11-13 ENCOUNTER — PHARMACY VISIT (OUTPATIENT)
Dept: PHARMACY | Facility: CLINIC | Age: 33
End: 2023-11-13
Payer: COMMERCIAL

## 2023-11-13 PROCEDURE — 59409 OBSTETRICAL CARE: CPT | Performed by: OBSTETRICS & GYNECOLOGY

## 2023-11-13 PROCEDURE — 2500000004 HC RX 250 GENERAL PHARMACY W/ HCPCS (ALT 636 FOR OP/ED)

## 2023-11-13 PROCEDURE — 2500000005 HC RX 250 GENERAL PHARMACY W/O HCPCS: Performed by: STUDENT IN AN ORGANIZED HEALTH CARE EDUCATION/TRAINING PROGRAM

## 2023-11-13 PROCEDURE — 2500000001 HC RX 250 WO HCPCS SELF ADMINISTERED DRUGS (ALT 637 FOR MEDICARE OP): Performed by: NURSE PRACTITIONER

## 2023-11-13 PROCEDURE — 0UQGXZZ REPAIR VAGINA, EXTERNAL APPROACH: ICD-10-PCS | Performed by: OBSTETRICS & GYNECOLOGY

## 2023-11-13 PROCEDURE — 2500000001 HC RX 250 WO HCPCS SELF ADMINISTERED DRUGS (ALT 637 FOR MEDICARE OP)

## 2023-11-13 PROCEDURE — 96372 THER/PROPH/DIAG INJ SC/IM: CPT

## 2023-11-13 PROCEDURE — 2500000005 HC RX 250 GENERAL PHARMACY W/O HCPCS

## 2023-11-13 PROCEDURE — 1100000001 HC PRIVATE ROOM DAILY

## 2023-11-13 PROCEDURE — 94760 N-INVAS EAR/PLS OXIMETRY 1: CPT

## 2023-11-13 PROCEDURE — 59400 OBSTETRICAL CARE: CPT

## 2023-11-13 PROCEDURE — A59409 PR OBSTETRICAL CARE,VAG DELIV ONLY

## 2023-11-13 PROCEDURE — RXMED WILLOW AMBULATORY MEDICATION CHARGE

## 2023-11-13 PROCEDURE — A59409 PR OBSTETRICAL CARE,VAG DELIV ONLY: Performed by: STUDENT IN AN ORGANIZED HEALTH CARE EDUCATION/TRAINING PROGRAM

## 2023-11-13 RX ORDER — POLYETHYLENE GLYCOL 3350 17 G/17G
17 POWDER, FOR SOLUTION ORAL 2 TIMES DAILY PRN
Status: DISCONTINUED | OUTPATIENT
Start: 2023-11-13 | End: 2023-11-15 | Stop reason: HOSPADM

## 2023-11-13 RX ORDER — ACETAMINOPHEN 500 MG
1000 TABLET ORAL EVERY 6 HOURS PRN
Qty: 120 TABLET | Refills: 0 | Status: SHIPPED | OUTPATIENT
Start: 2023-11-13 | End: 2024-02-01

## 2023-11-13 RX ORDER — LOPERAMIDE HYDROCHLORIDE 2 MG/1
4 CAPSULE ORAL EVERY 2 HOUR PRN
Status: DISCONTINUED | OUTPATIENT
Start: 2023-11-13 | End: 2023-11-15 | Stop reason: HOSPADM

## 2023-11-13 RX ORDER — NORETHINDRONE 0.35 MG/1
1 TABLET ORAL DAILY
Qty: 28 TABLET | Refills: 1 | Status: SHIPPED | OUTPATIENT
Start: 2023-11-13 | End: 2024-02-12 | Stop reason: WASHOUT

## 2023-11-13 RX ORDER — OXYTOCIN/0.9 % SODIUM CHLORIDE 30/500 ML
60 PLASTIC BAG, INJECTION (ML) INTRAVENOUS ONCE AS NEEDED
Status: DISCONTINUED | OUTPATIENT
Start: 2023-11-13 | End: 2023-11-15 | Stop reason: HOSPADM

## 2023-11-13 RX ORDER — ADHESIVE BANDAGE
10 BANDAGE TOPICAL
Status: DISCONTINUED | OUTPATIENT
Start: 2023-11-13 | End: 2023-11-15 | Stop reason: HOSPADM

## 2023-11-13 RX ORDER — NIFEDIPINE 10 MG/1
10 CAPSULE ORAL ONCE AS NEEDED
Status: DISCONTINUED | OUTPATIENT
Start: 2023-11-13 | End: 2023-11-15 | Stop reason: HOSPADM

## 2023-11-13 RX ORDER — OXYTOCIN 10 [USP'U]/ML
10 INJECTION, SOLUTION INTRAMUSCULAR; INTRAVENOUS ONCE AS NEEDED
Status: DISCONTINUED | OUTPATIENT
Start: 2023-11-13 | End: 2023-11-15 | Stop reason: HOSPADM

## 2023-11-13 RX ORDER — METHYLERGONOVINE MALEATE 0.2 MG/ML
0.2 INJECTION INTRAVENOUS ONCE AS NEEDED
Status: DISCONTINUED | OUTPATIENT
Start: 2023-11-13 | End: 2023-11-15 | Stop reason: HOSPADM

## 2023-11-13 RX ORDER — BISACODYL 10 MG/1
10 SUPPOSITORY RECTAL DAILY PRN
Status: DISCONTINUED | OUTPATIENT
Start: 2023-11-13 | End: 2023-11-15 | Stop reason: HOSPADM

## 2023-11-13 RX ORDER — LABETALOL HYDROCHLORIDE 5 MG/ML
20 INJECTION, SOLUTION INTRAVENOUS ONCE AS NEEDED
Status: DISCONTINUED | OUTPATIENT
Start: 2023-11-13 | End: 2023-11-15 | Stop reason: HOSPADM

## 2023-11-13 RX ORDER — TRANEXAMIC ACID 100 MG/ML
1000 INJECTION, SOLUTION INTRAVENOUS ONCE AS NEEDED
Status: DISCONTINUED | OUTPATIENT
Start: 2023-11-13 | End: 2023-11-15 | Stop reason: HOSPADM

## 2023-11-13 RX ORDER — DOCUSATE SODIUM 100 MG/1
100 CAPSULE, LIQUID FILLED ORAL 2 TIMES DAILY PRN
Qty: 60 CAPSULE | Refills: 0 | Status: SHIPPED | OUTPATIENT
Start: 2023-11-13 | End: 2023-12-14

## 2023-11-13 RX ORDER — MISOPROSTOL 200 UG/1
800 TABLET ORAL ONCE AS NEEDED
Status: DISCONTINUED | OUTPATIENT
Start: 2023-11-13 | End: 2023-11-15 | Stop reason: HOSPADM

## 2023-11-13 RX ORDER — ONDANSETRON 4 MG/1
4 TABLET, FILM COATED ORAL EVERY 6 HOURS PRN
Status: DISCONTINUED | OUTPATIENT
Start: 2023-11-13 | End: 2023-11-15 | Stop reason: HOSPADM

## 2023-11-13 RX ORDER — LIDOCAINE 560 MG/1
1 PATCH PERCUTANEOUS; TOPICAL; TRANSDERMAL
Status: DISCONTINUED | OUTPATIENT
Start: 2023-11-13 | End: 2023-11-15 | Stop reason: HOSPADM

## 2023-11-13 RX ORDER — FENTANYL/BUPIVACAINE/NS/PF 2MCG/ML-.1
0-54 PLASTIC BAG, INJECTION (ML) INJECTION CONTINUOUS
Status: DISCONTINUED | OUTPATIENT
Start: 2023-11-13 | End: 2023-11-13

## 2023-11-13 RX ORDER — FAMOTIDINE 40 MG/1
40 TABLET, FILM COATED ORAL ONCE
Status: COMPLETED | OUTPATIENT
Start: 2023-11-13 | End: 2023-11-13

## 2023-11-13 RX ORDER — ACETAMINOPHEN 325 MG/1
975 TABLET ORAL EVERY 6 HOURS
Status: DISCONTINUED | OUTPATIENT
Start: 2023-11-13 | End: 2023-11-15 | Stop reason: HOSPADM

## 2023-11-13 RX ORDER — IBUPROFEN 600 MG/1
600 TABLET ORAL EVERY 6 HOURS PRN
Qty: 120 TABLET | Refills: 0 | Status: SHIPPED | OUTPATIENT
Start: 2023-11-13 | End: 2023-12-13

## 2023-11-13 RX ORDER — SIMETHICONE 80 MG
80 TABLET,CHEWABLE ORAL 4 TIMES DAILY PRN
Status: DISCONTINUED | OUTPATIENT
Start: 2023-11-13 | End: 2023-11-15 | Stop reason: HOSPADM

## 2023-11-13 RX ORDER — HYDRALAZINE HYDROCHLORIDE 20 MG/ML
5 INJECTION INTRAMUSCULAR; INTRAVENOUS ONCE AS NEEDED
Status: DISCONTINUED | OUTPATIENT
Start: 2023-11-13 | End: 2023-11-15 | Stop reason: HOSPADM

## 2023-11-13 RX ORDER — FENTANYL/BUPIVACAINE/NS/PF 2MCG/ML-.1
PLASTIC BAG, INJECTION (ML) INJECTION AS NEEDED
Status: DISCONTINUED | OUTPATIENT
Start: 2023-11-13 | End: 2023-11-13

## 2023-11-13 RX ORDER — ONDANSETRON HYDROCHLORIDE 2 MG/ML
4 INJECTION, SOLUTION INTRAVENOUS EVERY 6 HOURS PRN
Status: DISCONTINUED | OUTPATIENT
Start: 2023-11-13 | End: 2023-11-15 | Stop reason: HOSPADM

## 2023-11-13 RX ORDER — ENOXAPARIN SODIUM 100 MG/ML
40 INJECTION SUBCUTANEOUS EVERY 24 HOURS
Status: DISCONTINUED | OUTPATIENT
Start: 2023-11-13 | End: 2023-11-15 | Stop reason: HOSPADM

## 2023-11-13 RX ORDER — DIPHENHYDRAMINE HYDROCHLORIDE 50 MG/ML
25 INJECTION INTRAMUSCULAR; INTRAVENOUS EVERY 6 HOURS PRN
Status: DISCONTINUED | OUTPATIENT
Start: 2023-11-13 | End: 2023-11-15 | Stop reason: HOSPADM

## 2023-11-13 RX ORDER — DIPHENHYDRAMINE HCL 25 MG
25 CAPSULE ORAL EVERY 6 HOURS PRN
Status: DISCONTINUED | OUTPATIENT
Start: 2023-11-13 | End: 2023-11-15 | Stop reason: HOSPADM

## 2023-11-13 RX ORDER — CARBOPROST TROMETHAMINE 250 UG/ML
250 INJECTION, SOLUTION INTRAMUSCULAR ONCE AS NEEDED
Status: DISCONTINUED | OUTPATIENT
Start: 2023-11-13 | End: 2023-11-15 | Stop reason: HOSPADM

## 2023-11-13 RX ORDER — IBUPROFEN 600 MG/1
600 TABLET ORAL EVERY 6 HOURS
Status: DISCONTINUED | OUTPATIENT
Start: 2023-11-13 | End: 2023-11-15 | Stop reason: HOSPADM

## 2023-11-13 RX ADMIN — Medication 14 ML/HR: at 04:37

## 2023-11-13 RX ADMIN — SODIUM CHLORIDE, POTASSIUM CHLORIDE, SODIUM LACTATE AND CALCIUM CHLORIDE 500 ML: 600; 310; 30; 20 INJECTION, SOLUTION INTRAVENOUS at 04:07

## 2023-11-13 RX ADMIN — SODIUM CHLORIDE, POTASSIUM CHLORIDE, SODIUM LACTATE AND CALCIUM CHLORIDE 125 ML/HR: 600; 310; 30; 20 INJECTION, SOLUTION INTRAVENOUS at 04:41

## 2023-11-13 RX ADMIN — Medication 5 ML: at 04:30

## 2023-11-13 RX ADMIN — IBUPROFEN 600 MG: 600 TABLET ORAL at 18:09

## 2023-11-13 RX ADMIN — METHYLERGONOVINE MALEATE 0.2 MG: 0.2 INJECTION, SOLUTION INTRAMUSCULAR; INTRAVENOUS at 07:30

## 2023-11-13 RX ADMIN — CARIPRAZINE 3 MG: 3 CAPSULE, GELATIN COATED ORAL at 23:55

## 2023-11-13 RX ADMIN — MISOPROSTOL 800 MCG: 200 TABLET ORAL at 07:25

## 2023-11-13 RX ADMIN — PENICILLIN G 3 MILLION UNITS: 3000000 INJECTION, SOLUTION INTRAVENOUS at 06:03

## 2023-11-13 RX ADMIN — ACETAMINOPHEN 975 MG: 325 TABLET ORAL at 11:56

## 2023-11-13 RX ADMIN — FAMOTIDINE 40 MG: 40 TABLET ORAL at 02:20

## 2023-11-13 RX ADMIN — IBUPROFEN 600 MG: 600 TABLET ORAL at 23:56

## 2023-11-13 RX ADMIN — Medication 1 EACH: at 11:57

## 2023-11-13 RX ADMIN — ACETAMINOPHEN 975 MG: 325 TABLET ORAL at 18:09

## 2023-11-13 RX ADMIN — PENICILLIN G 3 MILLION UNITS: 3000000 INJECTION, SOLUTION INTRAVENOUS at 01:40

## 2023-11-13 RX ADMIN — IBUPROFEN 600 MG: 600 TABLET ORAL at 11:57

## 2023-11-13 RX ADMIN — Medication 5 ML: at 04:37

## 2023-11-13 RX ADMIN — ENOXAPARIN SODIUM 40 MG: 100 INJECTION SUBCUTANEOUS at 23:56

## 2023-11-13 RX ADMIN — BENZOCAINE AND LEVOMENTHOL 1 APPLICATION: 200; 5 SPRAY TOPICAL at 11:57

## 2023-11-13 RX ADMIN — ACETAMINOPHEN 975 MG: 325 TABLET ORAL at 23:56

## 2023-11-13 ASSESSMENT — PAIN SCALES - GENERAL

## 2023-11-13 ASSESSMENT — PAIN - FUNCTIONAL ASSESSMENT: PAIN_FUNCTIONAL_ASSESSMENT: 0-10

## 2023-11-13 NOTE — INDIVIDUALIZED OVERALL PLAN OF CARE NOTE
Patient resting comfortably in bed.    FHTs: baseline 140, moderate variability, +accels, 1 late decel, variable decels followed by reactive trace  [place]: ctx q2-3min  SVE: 9/100/0    A/P:     IOL/Labor  - Pit Pause  - Epidural infusing, good pain control    Kayley Saavedra MD PGY1

## 2023-11-13 NOTE — DISCHARGE INSTRUCTIONS

## 2023-11-13 NOTE — INDIVIDUALIZED OVERALL PLAN OF CARE NOTE
Patient resting comfortably in bed.    FHTs: baseline 140, moderate variability, +accels, -decels  [place]: ctx q2-3min  SVE: 5/100/-2    A/P:     IOL/Labor  - Titrate pitocin per protocol, currently at 24  - CEFM; Cat 1 currently  - GBS+, PCN    Kayley Saavedra MD PGY1

## 2023-11-13 NOTE — ANESTHESIA PROCEDURE NOTES
Epidural Block    Patient location during procedure: OB  Start time: 11/13/2023 4:15 AM  End time: 11/13/2023 4:29 AM  Reason for block: labor analgesia  Staffing  Performed: CRNA   Authorized by: ANDRES Carvajal    Performed by: ANDRES Carvajal    Preanesthetic Checklist  Completed: patient identified, IV checked, risks and benefits discussed, surgical consent, pre-op evaluation, timeout performed and sterile techniques followed  Block Timeout  RN/Licensed healthcare professional reads aloud to the Anesthesia provider and entire team: Patient identity, procedure with side and site, patient position, and as applicable the availability of implants/special equipment/special requirements.  Patient on coagulant treatment: no  Timeout performed at: 11/13/2023 4:16 AM  Block Placement  Patient position: sitting  Prep: ChloraPrep  Sterility prep: drape, gloves, hand, mask and cap  Sedation level: no sedation  Patient monitoring: blood pressure, continuous pulse oximetry, EKG and heart rate  Approach: midline  Local numbing: lidocaine 1% to skin and subcutaneous tissues  Vertebral space: lumbar  Lumbar location: L2-L3  Epidural  Loss of resistance technique: saline  Guidance: landmark technique        Needle  Needle type: Tuohy   Needle gauge: 17  Needle length: 10 cm  Needle insertion depth: 4 cm  Catheter type: multi-orifice  Catheter size: 20 G  Catheter at skin depth: 12 cm  Catheter securement method: clear occlusive dressing and liquid medical adhesive    Test dose: lidocaine 1.5% with epinephrine 1-to-200,000  Test dose given at 11/13/2023 4:24 AM  Test dose: lidocaine 1.5% with epinephrine 1-to-200,000  Test dose result: no positive test dose    PCEA  Medication concentration used: 0.044% Bupivacaine with 1.25 mcg/mL Fentanyl and 1:[id number] Epinephrine  Dose (mL): 10  Lockout (minutes): 15  1-Hour Limit (boluses/hr): 4  Basal Rate: 14        Assessment  Sensory level: T10  Block outcome: patient  comfortable  Number of attempts: 1  Events: no positive test dose  Procedure assessment: patient tolerated procedure well with no immediate complications

## 2023-11-13 NOTE — PROGRESS NOTES
Intrapartum Progress Note    Assessment/Plan   Fang Carvalho is a 33 y.o.  at 37w3d. ALEXANDER: 2023, by 6.4 wk US presenting for SROM    Pregnancy n/f   - Hypothyroidism on no meds  - Bipolar disorder stable  - Ankylosing spondylitis of lumbar region    IOL   - SROM at 0300   - Pit infusing from 4 currently  - SVE: 5//-2 at 1730   - Epidural per pt request    Fetal wellbeing  - CEFM Cat1   - GBS + PCN      Subjective   Pt describes contractions tolerable at this time, will consider epidural. Denies any concerns, SOB, chest pain.    Objective   Last Vitals:  Temp Pulse Resp BP MAP Pulse Ox   36.8 °C (98.2 °F) 85 18 125/84   (!) 94 %     Vitals Min/Max Last 24 Hours:  Temp  Min: 36.5 °C (97.7 °F)  Max: 36.8 °C (98.2 °F)  Pulse  Min: 60  Max: 100  Resp  Min: 18  Max: 18  BP  Min: 105/60  Max: 134/82    Intake/Output:    Intake/Output Summary (Last 24 hours) at 2023 1922  Last data filed at 2023 1449  Gross per 24 hour   Intake 100 ml   Output --   Net 100 ml       Physical Examination:  GENERAL: Examination reveals a well developed, well nourished, gravid female in no acute distress. She is alert and cooperative.  NECK: supple, no significant adenopathy  LUNGS:  normal respiratory effort on room air  ABDOMEN: soft, gravid, nontender, nondistended, no abnormal masses, no epigastric pain  FHR is 135 baseline, moderate variability , with Accelerations, and a Cat1  tracing.    [place] reading:  ctx q 4 m  CERVIX: 5 cm dilated, 70 % effaced, -2 station;  at 1730 MEMBRANES are SROM  SKIN: normal coloration and turgor, no rashes  PSYCHOLOGICAL: awake and alert; oriented to person, place, and time    Seen and d/w Dr. Barbara Saavedra MD PGY1

## 2023-11-13 NOTE — CARE PLAN
The patient's goals for the shift include Patient has uncomplicated vaginal delivery    The clinical goals for the shift include Patient has good pain control throughout the shift    Patient delivered vaginally and is in recovery on mac 2. Report given to Shawn NGUYEN.

## 2023-11-13 NOTE — SIGNIFICANT EVENT
Patient resting comfortably in bed.    FHTs: baseline 135, moderate variability, +accels, -decels  [place]: ctx 4 min  SVE: 5/80/-2    A/P:     IOL/Labor  - Titrate pitocin per protocol, currently at 8  - Epidural if pt requests, currently tolerating pain well  - CEFM; Cat 1 currently  - GBS+, PCN    Kayley Saavedra MD PGY1

## 2023-11-13 NOTE — L&D DELIVERY NOTE
Delivery Note: Vaginal     ml, Pit bolus, Methergin, Cytotec 800 mg given for lower segment uterine hypotonia.   Periurethral abrasions hemostatic, Samina Carvalho [[id number]]      Labor Events    Sac identifier: Sac 1  Rupture date/time: 2023 0300  Rupture type: Spontaneous  Fluid color: Clear  Fluid odor: None  Labor type: Induced Onset of Labor  Labor allowed to proceed with plans for an attempted vaginal birth?: Yes  Induction: Oxytocin  First cervical ripening date/time: 2023 1759  Induction date/time: 2023 1320  Complications: None       Labor Event Times    Labor onset date/time: 2023 1320  Dilation complete date/time: 2023 0556  Start pushing date/time: 2023 0601       Placenta    Placenta delivery date/time: 2023 0706  Placenta removal: Spontaneous       Cord    Vessels: 3 vessels  Delayed cord clamping?: Yes  Cord blood disposition: Discarded  Gases sent?: No  Stem cell collection (by provider): No       Lacerations    Episiotomy: None  Perineal laceration: 1st  Periurethral laceration?: Yes  Periurethral laceration location: bilateral  Periurethral laceration repaired?: No  Vaginal laceration?: Yes  Vaginal laceration location: bilateral  Vaginal laceration repaired?: Yes  Repair suture: 3-0 Monocryl  Periurethral abrasions hemostatic,  1 cm vaginal lacerations at 5 and 7 oclock positions repaired by figure of 8 s using 3/0 monocryl.       Anesthesia    Method: Epidural       Operative Delivery    Forceps attempted?: No  Vacuum extractor attempted?: No       Shoulder Dystocia    Shoulder dystocia present?: No       [place] Delivery    Birth date/time: 2023 07:01:00  Delivery type: Vaginal, Spontaneous  Complications: None       Resuscitation    Method: Tactile stimulation, Suctioning       Apgars    Living status: Living  Apgar Component Scores:  1 min.:  5 min.:  10 min.:  15 min.:  20 min.:    Skin color:  1  1       Heart rate:  2  2       Reflex  irritability:  2  2       Muscle tone:  2  2       Respiratory effort:  2  2       Total:  9  9       Apgars assigned by: NATHALIE NGUYEN       Delivery Providers    Delivering clinician: Terese ABARCA MD   Provider Role    Joaquina Pan, RN Delivery Nurse    Gila Murrell, RN Nursery Nurse    Kayley Saavedra MD Resident    Grecia Peña MD Resident

## 2023-11-14 PROBLEM — O99.280 HYPOTHYROIDISM DURING PREGNANCY, ANTEPARTUM (HHS-HCC): Status: RESOLVED | Noted: 2023-10-12 | Resolved: 2023-11-14

## 2023-11-14 PROBLEM — O23.593: Status: RESOLVED | Noted: 2023-10-12 | Resolved: 2023-11-14

## 2023-11-14 PROBLEM — E03.9 HYPOTHYROIDISM DURING PREGNANCY, ANTEPARTUM (HHS-HCC): Status: RESOLVED | Noted: 2023-10-12 | Resolved: 2023-11-14

## 2023-11-14 PROCEDURE — 94760 N-INVAS EAR/PLS OXIMETRY 1: CPT

## 2023-11-14 PROCEDURE — 2500000001 HC RX 250 WO HCPCS SELF ADMINISTERED DRUGS (ALT 637 FOR MEDICARE OP)

## 2023-11-14 PROCEDURE — 1100000001 HC PRIVATE ROOM DAILY

## 2023-11-14 RX ADMIN — ACETAMINOPHEN 975 MG: 325 TABLET ORAL at 18:23

## 2023-11-14 RX ADMIN — ACETAMINOPHEN 975 MG: 325 TABLET ORAL at 12:07

## 2023-11-14 RX ADMIN — IBUPROFEN 600 MG: 600 TABLET ORAL at 18:24

## 2023-11-14 RX ADMIN — IBUPROFEN 600 MG: 600 TABLET ORAL at 06:03

## 2023-11-14 RX ADMIN — ACETAMINOPHEN 975 MG: 325 TABLET ORAL at 06:03

## 2023-11-14 RX ADMIN — IBUPROFEN 600 MG: 600 TABLET ORAL at 12:06

## 2023-11-14 ASSESSMENT — PAIN SCALES - GENERAL
PAINLEVEL_OUTOF10: 0 - NO PAIN

## 2023-11-14 ASSESSMENT — PAIN - FUNCTIONAL ASSESSMENT
PAIN_FUNCTIONAL_ASSESSMENT: 0-10
PAIN_FUNCTIONAL_ASSESSMENT: 0-10

## 2023-11-14 NOTE — PROGRESS NOTES
Postpartum Progress Note      Assessment/Plan     Fang Carvalho is a 33 y.o., , who had a Vaginal, Spontaneous   delivery on 2023  at 37w4d and is now postpartum day 1.    #Postpartum  - continue routine postpartum care  - pain well controlled on po medications  - DVT Score: 5 ; SCDs and enoxaparin prophylactic dose daily while inpatient    #Maternal Well-Being  - emotional support provided  - bonding with infant  - Contraception: POPs We discussed the need to take the pill at exactly the same time everyday and if >2hrs late a backup method must be used for 1 week. Pt verbalized understanding.   - MMRV: pt states she cannot receive live vaccines 2/2 simponi.   - Bipolar/anxiety: full remission. We reviewed s/sx and risk of PPD.    # Feeding  - Patient is strictly formula feeding. We reviewed non-pharmacologic methods of lactation suppression and s/sx of mastitis.     #Dispo  - anticipate d/c on PPD #2 if continuing to meet all postpartum milestones  - for f/u 4-6 weeks with Primary OB provider    Principal Problem:     (normal spontaneous vaginal delivery)  Active Problems:    Ankylosing spondylitis of lumbar region (CMS/HCC)    Anxiety disorder    Bipolar II disorder, severe, depressed, with mixed features, in full remission (CMS/HCC)    Rubella non-immune status, antepartum    Chronic low back pain    The patient's blood type is A POS.  Rhogam is not indicated.     Subjective   Pt seen at bedside with parents and  in room. Mood is upbeat. She denies complaints, states she feels great.   Meeting all postpartum milestones- ambulating independently, passing flatus, tolerating PO intake, lochia light, voiding spontaneously, and pain well controlled with PO meds.    Objective   Physical Exam:  General: well appearing, well nourished, postpartum  Obstetric: fundus firm below umbilicus, lochia light  Skin: Warm, dry; no rashes/lesions/erythema  Breast: No masses, nipple discharge  Neuro:  A/Ox3, conversational, no gross motor deficit   GI: no distension, appropriately tender, soft, +BS  Respiratory: Even and unlabored on RA, LSCTA BL  Cardiovascular: Trace BLE edema; No erythema, warmth  Psych: appropriate mood and affect; pleasant    Last Vitals:  Temp Pulse Resp BP MAP Pulse Ox   36.6 °C (97.9 °F) 62 16 107/71   96 %       Vitals Min/Max Last 24 Hours:  Temp  Min: 36.2 °C (97.2 °F)  Max: 36.6 °C (97.9 °F)  Pulse  Min: 53  Max: 65  Resp  Min: 16  Max: 18  BP  Min: 101/65  Max: 118/78    Lab Data:  Lab Results   Component Value Date    WBC 14.9 (H) 11/12/2023    HGB 12.7 11/12/2023    HCT 39.4 11/12/2023     11/12/2023

## 2023-11-14 NOTE — ANESTHESIA POSTPROCEDURE EVALUATION
Patient: Fang Carvalho    Procedure Summary       Date: 11/13/23 Room / Location:     Anesthesia Start: 0415 Anesthesia Stop: 0701    Procedure: Labor Analgesia Diagnosis:     Scheduled Providers:  Responsible Provider: ANDRES Carvajal    Anesthesia Type: epidural ASA Status: 2            Anesthesia Type: epidural    Vitals Value Taken Time   /65 11/14/23 0341   Temp 36.3 11/14/23 0341   Pulse 53 11/14/23 0341   Resp 18 11/14/23 0341   SpO2 96 11/14/23 0341       Anesthesia Post Evaluation    No notable events documented.     Alert and oriented to person, place and time

## 2023-11-15 VITALS
OXYGEN SATURATION: 97 % | HEART RATE: 57 BPM | BODY MASS INDEX: 31.58 KG/M2 | RESPIRATION RATE: 16 BRPM | DIASTOLIC BLOOD PRESSURE: 76 MMHG | SYSTOLIC BLOOD PRESSURE: 121 MMHG | TEMPERATURE: 98.4 F | HEIGHT: 64 IN

## 2023-11-15 LAB
ANION GAP SERPL CALC-SCNC: 14 MMOL/L (ref 10–20)
BLOOD EXPIRATION DATE: NORMAL
BUN SERPL-MCNC: 10 MG/DL (ref 6–23)
CALCIUM SERPL-MCNC: 9.1 MG/DL (ref 8.6–10.6)
CHLORIDE SERPL-SCNC: 110 MMOL/L (ref 98–107)
CO2 SERPL-SCNC: 22 MMOL/L (ref 21–32)
CREAT SERPL-MCNC: 0.73 MG/DL (ref 0.5–1.05)
DISPENSE STATUS: NORMAL
GFR SERPL CREATININE-BSD FRML MDRD: >90 ML/MIN/1.73M*2
GLUCOSE SERPL-MCNC: 66 MG/DL (ref 74–99)
POTASSIUM SERPL-SCNC: 4.3 MMOL/L (ref 3.5–5.3)
PRODUCT BLOOD TYPE: 6200
PRODUCT CODE: NORMAL
SODIUM SERPL-SCNC: 142 MMOL/L (ref 136–145)
TSH SERPL-ACNC: 1.78 MIU/L (ref 0.44–3.98)
UNIT ABO: NORMAL
UNIT NUMBER: NORMAL
UNIT RH: NORMAL
UNIT VOLUME: 350
XM INTEP: NORMAL

## 2023-11-15 PROCEDURE — 96372 THER/PROPH/DIAG INJ SC/IM: CPT

## 2023-11-15 PROCEDURE — 84443 ASSAY THYROID STIM HORMONE: CPT

## 2023-11-15 PROCEDURE — 2500000001 HC RX 250 WO HCPCS SELF ADMINISTERED DRUGS (ALT 637 FOR MEDICARE OP): Performed by: NURSE PRACTITIONER

## 2023-11-15 PROCEDURE — 93010 ELECTROCARDIOGRAM REPORT: CPT | Performed by: INTERNAL MEDICINE

## 2023-11-15 PROCEDURE — 80048 BASIC METABOLIC PNL TOTAL CA: CPT

## 2023-11-15 PROCEDURE — 36415 COLL VENOUS BLD VENIPUNCTURE: CPT

## 2023-11-15 PROCEDURE — 2500000001 HC RX 250 WO HCPCS SELF ADMINISTERED DRUGS (ALT 637 FOR MEDICARE OP)

## 2023-11-15 PROCEDURE — 2500000004 HC RX 250 GENERAL PHARMACY W/ HCPCS (ALT 636 FOR OP/ED)

## 2023-11-15 RX ADMIN — IBUPROFEN 600 MG: 600 TABLET ORAL at 11:39

## 2023-11-15 RX ADMIN — IBUPROFEN 600 MG: 600 TABLET ORAL at 00:16

## 2023-11-15 RX ADMIN — CARIPRAZINE 3 MG: 3 CAPSULE, GELATIN COATED ORAL at 00:15

## 2023-11-15 RX ADMIN — ACETAMINOPHEN 975 MG: 325 TABLET ORAL at 00:15

## 2023-11-15 RX ADMIN — ACETAMINOPHEN 975 MG: 325 TABLET ORAL at 06:14

## 2023-11-15 RX ADMIN — IBUPROFEN 600 MG: 600 TABLET ORAL at 06:15

## 2023-11-15 RX ADMIN — ACETAMINOPHEN 975 MG: 325 TABLET ORAL at 11:39

## 2023-11-15 RX ADMIN — ENOXAPARIN SODIUM 40 MG: 100 INJECTION SUBCUTANEOUS at 00:15

## 2023-11-15 ASSESSMENT — PAIN SCALES - GENERAL
PAINLEVEL_OUTOF10: 0 - NO PAIN
PAINLEVEL_OUTOF10: 2

## 2023-11-15 NOTE — DISCHARGE SUMMARY
Discharge Summary    Admission Date: 2023  Discharge Date: 11/15/23     Discharge Diagnosis  Problem List Items Addressed This Visit       * (Principal)  (normal spontaneous vaginal delivery) - Primary    Relevant Medications    acetaminophen (Tylenol) 500 mg tablet    ibuprofen 600 mg tablet    docusate sodium (Colace) 100 mg capsule     Other Visit Diagnoses       Encounter for initial prescription of contraceptive pills        Relevant Medications    norethindrone (Micronor) 0.35 mg tablet             Pregnancy notable for:  Pregnancy Problems (from 23 to present)       Problem Noted Resolved     (normal spontaneous vaginal delivery) 2023 by Fabi Plata MD No    Priority:  Medium      Rubella non-immune status, antepartum 2023 by CYNTHIA De Anda No    Priority:  Medium      Overview Addendum 2023  3:51 PM by CYNTHIA Baird     Patient cannot receive live vaccines 2/2 to medication- simponi          Ankylosing spondylitis of lumbar region (CMS/HCC) 10/12/2023 by Farzana Sims MD No    Priority:  Medium      High risk pregnancy, antepartum 10/12/2023 by Farzana Sims MD No    Priority:  Medium      PCOS (polycystic ovarian syndrome) 10/12/2023 by Farzana Sims MD No    Priority:  Medium      Polyarticular juvenile idiopathic arthritis (CMS/[facility]) 10/12/2023 by Farzana Sims MD No    Priority:  Medium      Anxiety disorder 2017 by Farzana Sims MD No    Priority:  Medium      Bipolar II disorder, severe, depressed, with mixed features, in full remission (CMS/[facility]) 2016 by Farzana Sims MD No    Priority:  Medium      Hypothyroidism during pregnancy, antepartum 10/12/2023 by Farzana Sims MD 2023 by CYNTHIA Baird            Hospital Course  Admitted for active labor, SROM  Delivery Date: 2023  7:01 AM   Delivery type: Vaginal, Spontaneous    GA at delivery: 37w4d  Outcome: Living   Anesthesia during  delivery: Epidural   Intrapartum complications: None   Feeding method: Breastfeeding Status: No     Delivery complications: 1st degree and bilateral periurethral lacerations,  mL  Contraception at discharge: oral contraceptives    Pertinent Subjective and Physical Exam At Time of Discharge  Patient seen at bedside - doing well and no acute events overnight. Pain well-controlled on current regimen, lochia light, voiding spontaneously, passing flatus, ambulating independently, and tolerating PO.     General: well appearing, well-nourished, postpartum  Obstetric: abdomen soft/non-tender, fundus firm below umbilicus, lochia light  Skin: No rashes/lesions/erythema  Neuro: A/Ox3, conversational  GI: +BS, +flatus  Respiratory: Even and unlabored on RA, LSCTA BL  Cardiovascular: RRR, normal S1, S2  Extremities: No edema, discoloration, or pain in BLE, equal and palpable DP and PT pulses    Psych: appropriate mood and affect     Complications Requiring Follow-Up  Bradycardia  - HR 40s-50s while on postpartum  - denies CP, SOB, palpitations   - cardio/pulm exam WNL  - CBC/BMP WNL  - EKG sinus naz  - stable for DC, return to triage if development of CP, SOB, or persistently low HR    Discharge Meds     Your medication list        START taking these medications        Instructions Last Dose Given Next Dose Due   acetaminophen 500 mg tablet  Commonly known as: Tylenol      Take 2 tablets (1,000 mg) by mouth every 6 hours if needed for moderate pain (4 - 6).       docusate sodium 100 mg capsule  Commonly known as: Colace      Take 1 capsule (100 mg) by mouth 2 times a day as needed for constipation.        mg tablet  Generic drug: ibuprofen      Take 1 tablet (600 mg) by mouth every 6 hours if needed for moderate pain (4 - 6) (pain).       norethindrone 0.35 mg tablet  Commonly known as: Micronor      Take 1 tablet (0.35 mg) by mouth once daily.              CONTINUE taking these medications        Instructions  Last Dose Given Next Dose Due   cariprazine 3 mg capsule  Commonly known as: Vraylar           omeprazole OTC 20 mg EC tablet  Commonly known as: PriLOSEC OTC           Prenatal Vitamin 27 mg iron-800 mcg folic acid tablet  Generic drug: prenatal vitamin (iron-folic)           Simponi 100 mg/mL syringe  Generic drug: golimumab                     Where to Get Your Medications        These medications were sent to [facility]  [address] [place] [id number]      Hours: 8:30 AM to 5 PM Mon-Fri Phone: [phone]   acetaminophen 500 mg tablet  docusate sodium 100 mg capsule   mg tablet  norethindrone 0.35 mg tablet          Test Results Pending At Discharge  Pending Labs       No current pending labs.            Outpatient Follow-Up  Future Appointments   Date Time Provider Department Center   11/16/2023  8:45 AM Chantel Souza MD [id number] [place]   11/29/2023  8:45 AM Chantel Souza MD [id number] [facility] order placed to schedule 4-6 weeks for routine postpartum visit  with Dr. Souza.    Katharine aRo PA-C  11/15/23 3:59 PM  [place]

## 2023-11-16 ENCOUNTER — APPOINTMENT (OUTPATIENT)
Dept: OBSTETRICS AND GYNECOLOGY | Facility: CLINIC | Age: 33
End: 2023-11-16
Payer: COMMERCIAL

## 2023-11-16 NOTE — SIGNIFICANT EVENT
Follow-up Phone Call Note:   Interview:  Care Type: Women's Health   Phone Number Call  [phone]   Call Outcome: Left Message                                              Date/Time Of Call: 11/16/23 1252   Call Back Done By: care coordinator   Callback Complete:  Yes

## 2023-11-17 ENCOUNTER — HOSPITAL ENCOUNTER (OUTPATIENT)
Dept: CARDIOLOGY | Facility: HOSPITAL | Age: 33
Discharge: HOME | End: 2023-11-17
Payer: COMMERCIAL

## 2023-11-17 LAB
ATRIAL RATE: 47 BPM
P AXIS: 38 DEGREES
P OFFSET: 196 MS
P ONSET: 151 MS
PR INTERVAL: 130 MS
Q ONSET: 216 MS
QRS COUNT: 8 BEATS
QRS DURATION: 90 MS
QT INTERVAL: 450 MS
QTC CALCULATION(BAZETT): 398 MS
QTC FREDERICIA: 414 MS
R AXIS: 61 DEGREES
T AXIS: 52 DEGREES
T OFFSET: 441 MS
VENTRICULAR RATE: 47 BPM

## 2023-11-17 PROCEDURE — 93005 ELECTROCARDIOGRAM TRACING: CPT

## 2023-11-20 ENCOUNTER — ROUTINE PRENATAL (OUTPATIENT)
Dept: OBSTETRICS AND GYNECOLOGY | Facility: CLINIC | Age: 33
End: 2023-11-20
Payer: COMMERCIAL

## 2023-11-20 ENCOUNTER — LAB (OUTPATIENT)
Dept: LAB | Facility: LAB | Age: 33
End: 2023-11-20
Payer: COMMERCIAL

## 2023-11-20 ENCOUNTER — TELEPHONE (OUTPATIENT)
Dept: OBSTETRICS AND GYNECOLOGY | Facility: CLINIC | Age: 33
End: 2023-11-20
Payer: COMMERCIAL

## 2023-11-20 LAB
ALBUMIN SERPL BCP-MCNC: 4 G/DL (ref 3.4–5)
ALP SERPL-CCNC: 71 U/L (ref 33–110)
ALT SERPL W P-5'-P-CCNC: 28 U/L (ref 7–45)
ANION GAP SERPL CALC-SCNC: 12 MMOL/L (ref 10–20)
AST SERPL W P-5'-P-CCNC: 22 U/L (ref 9–39)
BILIRUB SERPL-MCNC: 0.3 MG/DL (ref 0–1.2)
BUN SERPL-MCNC: 11 MG/DL (ref 6–23)
CALCIUM SERPL-MCNC: 9.3 MG/DL (ref 8.6–10.3)
CHLORIDE SERPL-SCNC: 104 MMOL/L (ref 98–107)
CO2 SERPL-SCNC: 27 MMOL/L (ref 21–32)
CREAT SERPL-MCNC: 0.67 MG/DL (ref 0.5–1.05)
ERYTHROCYTE [DISTWIDTH] IN BLOOD BY AUTOMATED COUNT: 14 % (ref 11.5–14.5)
GFR SERPL CREATININE-BSD FRML MDRD: >90 ML/MIN/1.73M*2
GLUCOSE SERPL-MCNC: 72 MG/DL (ref 74–99)
HCT VFR BLD AUTO: 37.9 % (ref 36–46)
HGB BLD-MCNC: 12.2 G/DL (ref 12–16)
MCH RBC QN AUTO: 28.7 PG (ref 26–34)
MCHC RBC AUTO-ENTMCNC: 32.2 G/DL (ref 32–36)
MCV RBC AUTO: 89 FL (ref 80–100)
NRBC BLD-RTO: 0 /100 WBCS (ref 0–0)
PLATELET # BLD AUTO: 322 X10*3/UL (ref 150–450)
POTASSIUM SERPL-SCNC: 4.1 MMOL/L (ref 3.5–5.3)
PROT SERPL-MCNC: 7 G/DL (ref 6.4–8.2)
RBC # BLD AUTO: 4.25 X10*6/UL (ref 4–5.2)
SODIUM SERPL-SCNC: 139 MMOL/L (ref 136–145)
WBC # BLD AUTO: 11 X10*3/UL (ref 4.4–11.3)

## 2023-11-20 PROCEDURE — 80053 COMPREHEN METABOLIC PANEL: CPT

## 2023-11-20 PROCEDURE — 36415 COLL VENOUS BLD VENIPUNCTURE: CPT

## 2023-11-20 PROCEDURE — 0503F POSTPARTUM CARE VISIT: CPT | Performed by: STUDENT IN AN ORGANIZED HEALTH CARE EDUCATION/TRAINING PROGRAM

## 2023-11-20 PROCEDURE — 85027 COMPLETE CBC AUTOMATED: CPT

## 2023-11-20 NOTE — PROGRESS NOTES
Post Partum Visit  Subjective    Fang Carvalho is a 33 y.o.  presenting for postpartum follow-up:    Delivery Date: 2023   GA at Delivery: 37w4d  Type of Delivery: Vaginal, Spontaneous      Pregnancy was complicated by:  Pregnancy Problems (from 23 to 23)       Problem Noted Resolved    Rubella non-immune status, antepartum 2023 by CYNTHIA De Anda No    Overview Addendum 2023  3:51 PM by CYNTHIA Baird     Patient cannot receive live vaccines 2/2 to medication- simponi          Ankylosing spondylitis of lumbar region (CMS/HCC) 10/12/2023 by Farzana Sims MD No    High risk pregnancy, antepartum 10/12/2023 by Farzana Sims MD No    PCOS (polycystic ovarian syndrome) 10/12/2023 by Farzana Sims MD No    Polyarticular juvenile idiopathic arthritis (CMS/HCC) 10/12/2023 by Farzana Sims MD No    Anxiety disorder 2017 by Farzana Sims MD No    Bipolar II disorder, severe, depressed, with mixed features, in full remission (CMS/HCC) 2016 by Farzana Sims MD No     (normal spontaneous vaginal delivery) 2023 by Fabi Plata MD 2023 by Kay Iniguez CMA    Hypothyroidism during pregnancy, antepartum 10/12/2023 by Farzana Sims MD 2023 by CYNTHIA Baird            Concerns:     Reports increased hand and feet swelling on Friday. Checked BP last night and had readings of 142/79, 156/87. Denies HA, Chest pain, changes in vision, shortness of breath, RUQ pain.     No hypertension during pregnancy or intrapartum, vitals reviewed.       Objective   /80  HR 80    Physical Exam  Constitutional:       General: She is not in acute distress.     Appearance: Normal appearance.   HENT:      Head: Normocephalic.   Eyes:      General: No scleral icterus.     Extraocular Movements: Extraocular movements intact.   Cardiovascular:      Rate and Rhythm: Normal rate and regular rhythm.   Pulmonary:      Effort:  Pulmonary effort is normal. No respiratory distress.   Neurological:      General: No focal deficit present.      Mental Status: She is alert and oriented to person, place, and time.   Skin:     General: Skin is warm and dry.   Psychiatric:         Mood and Affect: Mood normal.         Behavior: Behavior normal.         Thought Content: Thought content normal.         Judgment: Judgment normal.   Vitals reviewed.              Assessment/Plan   Concern for elevated blood pressure  - Reviewed how and when to take BP  - CBC, CMP today  - Given normotensive in office and no severe range Bps, does not need hospital evaluation  - Discussed precautions for elevated and severe range Bps and when to seek emergent and urgent care, all questions answered.    Follow up: 3-4w for full PPV    David Paiz MD

## 2023-11-20 NOTE — TELEPHONE ENCOUNTER
Spoke to pt and she will come to office first to get evaluated and see what MD says.   Swelling started on Friday and BP has been creeping up and she is a little concerned.

## 2023-11-20 NOTE — PATIENT INSTRUCTIONS
If blood pressure 160 or higher on top number, or 110 or higher bottom number, proceed to hospital for evaluation.    If 150-159 on top of  on bottom, notify clinic    Take BP 2x/day, sitting in a chair with legs uncrossed and after resting for 5 minutes.

## 2023-11-20 NOTE — PROGRESS NOTES
Postpartum Progress Note    Assessment/Plan   Fang Carvalho is a 33 y.o., No obstetric history on file., who delivered at Unknown gestation and is now postpartum day 7.    Postpartum Depression: Not on file        Active Problems:  There are no active Hospital Problems.      Problem List      Problem List Items Addressed This Visit    None        History    OB History    Para Term  AB Living   1 1 1 0 0 1   SAB IAB Ectopic Multiple Live Births   0 0 0 0 1      # Outcome Date GA Lbr Uziel/2nd Weight Sex Delivery Anes PTL Lv   1 Term 23 37w4d 16:36 / 01:05 2890 g F Vag-Spont EPI  MATEO      Name: FIDEL CARVALHO      Apgar1: 9  Apgar5: 9        Labs    Lab Results   Component Value Date    WBC 14.9 (H) 2023    HGB 12.7 2023    HCT 39.4 2023    MCV 90 2023     2023         Objective     Allergies:   Allergies   Allergen Reactions    Biaxin [Clarithromycin] Psychosis                 Last Vitals:  Temp Pulse Resp BP MAP Pulse Ox                   Vitals Min/Max Last 24 Hours:  OB Vitals  LMP 2023   OB Status Recent pregnancy      There were no vitals taken for this visit.   There is no height or weight on file to calculate BMI.   There were no vitals filed for this visit. .FLOWAMB[11      Obstetrical History  OB History    Para Term  AB Living   1 1 1 0 0 1   SAB IAB Ectopic Multiple Live Births   0 0 0 0 1          Infant Information:  Baby Name: Yolanda  Baby Weight: 6lb 6oz   Complications: jaundice  Infant feeding: bottle      Reproduction History:  Birth control method:mini pill    Symptoms:    Problem Details: ? BP elevated / swelling       Plan:

## 2023-11-22 ENCOUNTER — APPOINTMENT (OUTPATIENT)
Dept: OBSTETRICS AND GYNECOLOGY | Facility: CLINIC | Age: 33
End: 2023-11-22
Payer: COMMERCIAL

## 2023-11-29 ENCOUNTER — APPOINTMENT (OUTPATIENT)
Dept: OBSTETRICS AND GYNECOLOGY | Facility: CLINIC | Age: 33
End: 2023-11-29
Payer: COMMERCIAL

## 2023-12-05 PROCEDURE — RXMED WILLOW AMBULATORY MEDICATION CHARGE

## 2023-12-06 ENCOUNTER — PHARMACY VISIT (OUTPATIENT)
Dept: PHARMACY | Facility: CLINIC | Age: 33
End: 2023-12-06
Payer: COMMERCIAL

## 2023-12-12 ENCOUNTER — APPOINTMENT (OUTPATIENT)
Dept: PRIMARY CARE | Facility: CLINIC | Age: 33
End: 2023-12-12
Payer: COMMERCIAL

## 2023-12-20 ENCOUNTER — POSTPARTUM VISIT (OUTPATIENT)
Dept: OBSTETRICS AND GYNECOLOGY | Facility: CLINIC | Age: 33
End: 2023-12-20
Payer: COMMERCIAL

## 2023-12-20 VITALS
SYSTOLIC BLOOD PRESSURE: 114 MMHG | WEIGHT: 180 LBS | HEIGHT: 64 IN | DIASTOLIC BLOOD PRESSURE: 70 MMHG | BODY MASS INDEX: 30.73 KG/M2

## 2023-12-20 PROCEDURE — 0503F POSTPARTUM CARE VISIT: CPT | Performed by: OBSTETRICS & GYNECOLOGY

## 2023-12-20 NOTE — PROGRESS NOTES
Plan    Advised to call office for breast complaints, abnormal bleeding, mood changes, or other concerning symptoms.   Cleared to resume normal activity as desired      Problem List Items Addressed This Visit             ICD-10-CM       Gravid and     Routine postpartum follow-up - Primary Z39.2     Will restart sronyx for birth control when baby is 6 weeks old.  Well woman visit in 6 months.   Call with concerns.            Chantel Souza MD    Subjective   33 y.o.  presenting for postpartum follow-up   No concerns. Mood is good. Pain from epidural at first.      Problem List       No episode was linked to this visit.          Lochia: none  Contraceptive Method: POP  Feeding Method: She is bottle feeding. no breast or nursing problems      Physical Exam  Constitutional:       Appearance: Normal appearance. She is normal weight.   Genitourinary:      Urethral meatus normal.      Perineal sutures intact.  Neurological:      Mental Status: She is alert.   Psychiatric:         Mood and Affect: Mood normal.         Behavior: Behavior normal.         Thought Content: Thought content normal.

## 2023-12-22 ENCOUNTER — TELEPHONE (OUTPATIENT)
Dept: OBSTETRICS AND GYNECOLOGY | Facility: CLINIC | Age: 33
End: 2023-12-22
Payer: COMMERCIAL

## 2023-12-22 DIAGNOSIS — Z30.41 ENCOUNTER FOR SURVEILLANCE OF CONTRACEPTIVE PILLS: ICD-10-CM

## 2023-12-22 RX ORDER — LEVONORGESTREL/ETHIN.ESTRADIOL 0.1-0.02MG
1 TABLET ORAL DAILY
Qty: 28 TABLET | Refills: 12 | Status: SHIPPED | OUTPATIENT
Start: 2023-12-22 | End: 2024-02-12 | Stop reason: WASHOUT

## 2023-12-22 NOTE — ASSESSMENT & PLAN NOTE
Will restart sronyx for birth control when baby is 6 weeks old.  Well woman visit in 6 months.   Call with concerns.

## 2023-12-22 NOTE — TELEPHONE ENCOUNTER
Per chart review,dr cornejo documented pt to start sronx when baby is 6 weeks old.   Order placed in system for dr cornejo to approve.

## 2024-01-27 ENCOUNTER — APPOINTMENT (OUTPATIENT)
Dept: RADIOLOGY | Facility: HOSPITAL | Age: 34
End: 2024-01-27
Payer: COMMERCIAL

## 2024-01-27 ENCOUNTER — APPOINTMENT (OUTPATIENT)
Dept: CARDIOLOGY | Facility: HOSPITAL | Age: 34
End: 2024-01-27
Payer: COMMERCIAL

## 2024-01-27 ENCOUNTER — HOSPITAL ENCOUNTER (EMERGENCY)
Facility: HOSPITAL | Age: 34
Discharge: HOME | End: 2024-01-27
Attending: STUDENT IN AN ORGANIZED HEALTH CARE EDUCATION/TRAINING PROGRAM
Payer: COMMERCIAL

## 2024-01-27 VITALS
TEMPERATURE: 98.6 F | OXYGEN SATURATION: 99 % | DIASTOLIC BLOOD PRESSURE: 74 MMHG | HEIGHT: 64 IN | RESPIRATION RATE: 17 BRPM | WEIGHT: 182 LBS | BODY MASS INDEX: 31.07 KG/M2 | SYSTOLIC BLOOD PRESSURE: 122 MMHG | HEART RATE: 70 BPM

## 2024-01-27 DIAGNOSIS — R07.9 CHEST PAIN, UNSPECIFIED TYPE: Primary | ICD-10-CM

## 2024-01-27 LAB
ALBUMIN SERPL BCP-MCNC: 4.5 G/DL (ref 3.4–5)
ALP SERPL-CCNC: 60 U/L (ref 33–110)
ALT SERPL W P-5'-P-CCNC: 27 U/L (ref 7–45)
ANION GAP SERPL CALC-SCNC: 12 MMOL/L (ref 10–20)
AST SERPL W P-5'-P-CCNC: 22 U/L (ref 9–39)
B-HCG SERPL-ACNC: <2 MIU/ML
BASOPHILS # BLD AUTO: 0.04 X10*3/UL (ref 0–0.1)
BASOPHILS NFR BLD AUTO: 0.4 %
BILIRUB SERPL-MCNC: 0.4 MG/DL (ref 0–1.2)
BNP SERPL-MCNC: 9 PG/ML (ref 0–99)
BUN SERPL-MCNC: 12 MG/DL (ref 6–23)
CALCIUM SERPL-MCNC: 9.5 MG/DL (ref 8.6–10.3)
CARDIAC TROPONIN I PNL SERPL HS: <3 NG/L (ref 0–13)
CARDIAC TROPONIN I PNL SERPL HS: <3 NG/L (ref 0–13)
CHLORIDE SERPL-SCNC: 104 MMOL/L (ref 98–107)
CO2 SERPL-SCNC: 29 MMOL/L (ref 21–32)
CREAT SERPL-MCNC: 0.83 MG/DL (ref 0.5–1.05)
D DIMER PPP FEU-MCNC: 892 NG/ML FEU
EGFRCR SERPLBLD CKD-EPI 2021: >90 ML/MIN/1.73M*2
EOSINOPHIL # BLD AUTO: 0.12 X10*3/UL (ref 0–0.7)
EOSINOPHIL NFR BLD AUTO: 1.1 %
ERYTHROCYTE [DISTWIDTH] IN BLOOD BY AUTOMATED COUNT: 13 % (ref 11.5–14.5)
FLUAV RNA RESP QL NAA+PROBE: NOT DETECTED
FLUBV RNA RESP QL NAA+PROBE: NOT DETECTED
GLUCOSE SERPL-MCNC: 94 MG/DL (ref 74–99)
HCT VFR BLD AUTO: 41.6 % (ref 36–46)
HGB BLD-MCNC: 13.4 G/DL (ref 12–16)
IMM GRANULOCYTES # BLD AUTO: 0.04 X10*3/UL (ref 0–0.7)
IMM GRANULOCYTES NFR BLD AUTO: 0.4 % (ref 0–0.9)
LYMPHOCYTES # BLD AUTO: 3.1 X10*3/UL (ref 1.2–4.8)
LYMPHOCYTES NFR BLD AUTO: 28.8 %
MCH RBC QN AUTO: 28 PG (ref 26–34)
MCHC RBC AUTO-ENTMCNC: 32.2 G/DL (ref 32–36)
MCV RBC AUTO: 87 FL (ref 80–100)
MONOCYTES # BLD AUTO: 0.66 X10*3/UL (ref 0.1–1)
MONOCYTES NFR BLD AUTO: 6.1 %
NEUTROPHILS # BLD AUTO: 6.8 X10*3/UL (ref 1.2–7.7)
NEUTROPHILS NFR BLD AUTO: 63.2 %
NRBC BLD-RTO: 0 /100 WBCS (ref 0–0)
PLATELET # BLD AUTO: 300 X10*3/UL (ref 150–450)
POTASSIUM SERPL-SCNC: 4.1 MMOL/L (ref 3.5–5.3)
PROT SERPL-MCNC: 7.5 G/DL (ref 6.4–8.2)
RBC # BLD AUTO: 4.78 X10*6/UL (ref 4–5.2)
SARS-COV-2 RNA RESP QL NAA+PROBE: NOT DETECTED
SODIUM SERPL-SCNC: 141 MMOL/L (ref 136–145)
WBC # BLD AUTO: 10.8 X10*3/UL (ref 4.4–11.3)

## 2024-01-27 PROCEDURE — 36415 COLL VENOUS BLD VENIPUNCTURE: CPT | Performed by: STUDENT IN AN ORGANIZED HEALTH CARE EDUCATION/TRAINING PROGRAM

## 2024-01-27 PROCEDURE — 93005 ELECTROCARDIOGRAM TRACING: CPT

## 2024-01-27 PROCEDURE — 71045 X-RAY EXAM CHEST 1 VIEW: CPT

## 2024-01-27 PROCEDURE — 99284 EMERGENCY DEPT VISIT MOD MDM: CPT | Performed by: STUDENT IN AN ORGANIZED HEALTH CARE EDUCATION/TRAINING PROGRAM

## 2024-01-27 PROCEDURE — 71045 X-RAY EXAM CHEST 1 VIEW: CPT | Performed by: RADIOLOGY

## 2024-01-27 PROCEDURE — 87636 SARSCOV2 & INF A&B AMP PRB: CPT | Performed by: STUDENT IN AN ORGANIZED HEALTH CARE EDUCATION/TRAINING PROGRAM

## 2024-01-27 PROCEDURE — 80053 COMPREHEN METABOLIC PANEL: CPT | Performed by: STUDENT IN AN ORGANIZED HEALTH CARE EDUCATION/TRAINING PROGRAM

## 2024-01-27 PROCEDURE — 71275 CT ANGIOGRAPHY CHEST: CPT | Performed by: RADIOLOGY

## 2024-01-27 PROCEDURE — 84484 ASSAY OF TROPONIN QUANT: CPT | Performed by: STUDENT IN AN ORGANIZED HEALTH CARE EDUCATION/TRAINING PROGRAM

## 2024-01-27 PROCEDURE — 83880 ASSAY OF NATRIURETIC PEPTIDE: CPT | Performed by: STUDENT IN AN ORGANIZED HEALTH CARE EDUCATION/TRAINING PROGRAM

## 2024-01-27 PROCEDURE — 85025 COMPLETE CBC W/AUTO DIFF WBC: CPT | Performed by: STUDENT IN AN ORGANIZED HEALTH CARE EDUCATION/TRAINING PROGRAM

## 2024-01-27 PROCEDURE — 71275 CT ANGIOGRAPHY CHEST: CPT

## 2024-01-27 PROCEDURE — 85379 FIBRIN DEGRADATION QUANT: CPT | Performed by: STUDENT IN AN ORGANIZED HEALTH CARE EDUCATION/TRAINING PROGRAM

## 2024-01-27 PROCEDURE — 2550000001 HC RX 255 CONTRASTS: Performed by: STUDENT IN AN ORGANIZED HEALTH CARE EDUCATION/TRAINING PROGRAM

## 2024-01-27 PROCEDURE — 99285 EMERGENCY DEPT VISIT HI MDM: CPT | Performed by: STUDENT IN AN ORGANIZED HEALTH CARE EDUCATION/TRAINING PROGRAM

## 2024-01-27 PROCEDURE — 84702 CHORIONIC GONADOTROPIN TEST: CPT | Performed by: STUDENT IN AN ORGANIZED HEALTH CARE EDUCATION/TRAINING PROGRAM

## 2024-01-27 RX ORDER — ACETAMINOPHEN 325 MG/1
650 TABLET ORAL ONCE
Status: COMPLETED | OUTPATIENT
Start: 2024-01-27 | End: 2024-01-27

## 2024-01-27 RX ADMIN — IOHEXOL 50 ML: 350 INJECTION, SOLUTION INTRAVENOUS at 12:28

## 2024-01-27 RX ADMIN — ACETAMINOPHEN 650 MG: 325 TABLET ORAL at 11:18

## 2024-01-27 ASSESSMENT — PAIN SCALES - GENERAL
PAINLEVEL_OUTOF10: 0 - NO PAIN
PAINLEVEL_OUTOF10: 4
PAINLEVEL_OUTOF10: 4
PAINLEVEL_OUTOF10: 0 - NO PAIN
PAINLEVEL_OUTOF10: 4
PAINLEVEL_OUTOF10: 4
PAINLEVEL_OUTOF10: 2
PAINLEVEL_OUTOF10: 0 - NO PAIN

## 2024-01-27 ASSESSMENT — PAIN - FUNCTIONAL ASSESSMENT: PAIN_FUNCTIONAL_ASSESSMENT: 0-10

## 2024-01-27 ASSESSMENT — LIFESTYLE VARIABLES
REASON UNABLE TO ASSESS: NO
EVER HAD A DRINK FIRST THING IN THE MORNING TO STEADY YOUR NERVES TO GET RID OF A HANGOVER: NO
EVER FELT BAD OR GUILTY ABOUT YOUR DRINKING: NO
HAVE YOU EVER FELT YOU SHOULD CUT DOWN ON YOUR DRINKING: NO
HAVE PEOPLE ANNOYED YOU BY CRITICIZING YOUR DRINKING: NO

## 2024-01-27 ASSESSMENT — PAIN DESCRIPTION - LOCATION: LOCATION: CHEST

## 2024-01-27 ASSESSMENT — COLUMBIA-SUICIDE SEVERITY RATING SCALE - C-SSRS
6. HAVE YOU EVER DONE ANYTHING, STARTED TO DO ANYTHING, OR PREPARED TO DO ANYTHING TO END YOUR LIFE?: NO
2. HAVE YOU ACTUALLY HAD ANY THOUGHTS OF KILLING YOURSELF?: NO
1. IN THE PAST MONTH, HAVE YOU WISHED YOU WERE DEAD OR WISHED YOU COULD GO TO SLEEP AND NOT WAKE UP?: NO

## 2024-01-27 ASSESSMENT — PAIN DESCRIPTION - DESCRIPTORS: DESCRIPTORS: CRUSHING

## 2024-01-27 ASSESSMENT — PAIN DESCRIPTION - PAIN TYPE: TYPE: ACUTE PAIN

## 2024-01-27 NOTE — ED PROVIDER NOTES
HPI   Chief Complaint   Patient presents with    Chest Pain     Started yesterday. Mid chest pain         33-year-old female presenting for midsternal chest pressure since yesterday evening that started around 10 PM.  States that she was walking with her  at a local department store with the onset of the discomfort.  She denies any shortness of breath, cough, sore throat, swelling of the lower extremities or history of hypertension, hyperlipidemia, diabetes, tobacco use or family history.  She does endorse use of estrogen product for birth control.  Only past medical history is ankylosing spondylitis and bipolar disorder.  States the pain is constant and not relieved with rest or exacerbated with exertion.  No lightheadedness or nausea or diaphoresis.  Pain to the emerged from today after did not get better after period of observation at home for approximate 12 hours.                          [place] Coma Scale Score: 15                  Patient History   Past Medical History:   Diagnosis Date    Ankylosing spondylitis (CMS/[facility])     Bipolar disorder, unspecified (CMS/[facility]) 01/05/2023    Bipolar 1 disorder    GERD without esophagitis     Hyperthyroidism complicating pregnancy     Long term (current) use of immunosuppressive biologic 12/30/2015    Adalimumab (Humira) long-term use    Personal history of other diseases of the musculoskeletal system and connective tissue 01/05/2023    History of ankylosing spondylitis    Polycystic ovarian syndrome 01/05/2023    PCOS (polycystic ovarian syndrome)    Raynaud's syndrome     Raynaud's syndrome without gangrene     Raynaud's phenomenon without gangrene     Past Surgical History:   Procedure Laterality Date    OTHER SURGICAL HISTORY  01/05/2023    Femur fracture repair     Family History   Problem Relation Name Age of Onset    Skin cancer Mother      Ankylosing spondylitis Father      Hypertension Father      Skin cancer Father      Skin cancer Sister       Social  History     Tobacco Use    Smoking status: Never    Smokeless tobacco: Never   Vaping Use    Vaping Use: Never used   Substance Use Topics    Alcohol use: Yes     Comment: 1x week    Drug use: Never       Physical Exam   ED Triage Vitals [01/27/24 1026]   Temperature Heart Rate Respirations BP   36.8 °C (98.2 °F) 78 20 112/69      Pulse Ox Temp Source Heart Rate Source Patient Position   100 % Temporal Monitor Standing      BP Location FiO2 (%)     Right arm --       Physical Exam  Vitals and nursing note reviewed.   Constitutional:       General: She is not in acute distress.     Appearance: She is well-developed. She is not ill-appearing.   HENT:      Head: Normocephalic and atraumatic.      Nose: No congestion or rhinorrhea.      Mouth/Throat:      Mouth: Mucous membranes are moist.      Pharynx: No oropharyngeal exudate or posterior oropharyngeal erythema.   Eyes:      Conjunctiva/sclera: Conjunctivae normal.   Cardiovascular:      Rate and Rhythm: Normal rate and regular rhythm.      Pulses: Normal pulses.      Heart sounds: No murmur heard.     No gallop.   Pulmonary:      Effort: Pulmonary effort is normal. No respiratory distress.      Breath sounds: Normal breath sounds. No stridor. No wheezing, rhonchi or rales.   Abdominal:      General: Bowel sounds are normal. There is no distension.      Palpations: Abdomen is soft.      Tenderness: There is no abdominal tenderness. There is no guarding or rebound.   Musculoskeletal:         General: No swelling.      Cervical back: Neck supple.   Skin:     General: Skin is warm and dry.      Capillary Refill: Capillary refill takes less than 2 seconds.      Findings: No rash.   Neurological:      General: No focal deficit present.      Mental Status: She is alert and oriented to person, place, and time.      Cranial Nerves: No cranial nerve deficit.      Sensory: No sensory deficit.      Gait: Gait normal.   Psychiatric:         Mood and Affect: Mood normal.          Behavior: Behavior normal.         Thought Content: Thought content normal.         ED Course & MDM   ED Course as of 01/27/24 1707   Sat Jan 27, 2024   1057 EKG performed at 10: 20 and independently reviewed by provider: Reveals NSR with a rate of 78 bpm, normal axis, normal intervals, no ST changes, no T wave abnormalities, no ectopy. No STEMI. [TL]   1107 HEART Score:    History:   [  x] Slightly suspicious - 0   [  ] Moderately suspicious - 1  [  ] Highly suspicious - 2     EKG:   [ x ] Normal - 0    [  ] Non-specific repolarization disturbance (No ST changes, but LBBB, LVH, repolarization changes)  - 1   [  ] Significant ST deviation (ST changes not d/t LBBB, LVH, digoxin)  - 2     Age:   [ x ] < 45 - 0   [  ] 45-64 - 1   [  ] > 65 - 2     Risk Factors:     HTN, HLD, DM, obesity (BMI > 30), smoking (current or w/I 3mo), + fmx (before age of 65), CAD, prior MI, PCI/CABG, CVA/TIA, PAD  [  ] No known risk factors - 0   [ x ] 1-2 risk factors - 1    [  ] >3 risk factors or hx of atherosclerotic disease - 2     Initial troponin:  [ x ] < normal limit - 0   [  ] 1 - 3x normal limit - 1   [  ] > 3x normal limit - 2    Total:   [ x] 0-3 Points: 1.7% risk of major adverse cardiac event in 6 weeks  [ ] 4-6 Points: 12-16.6% risk of major adverse cardiac event in 6 weeks  [ ] 7-10 Points 50-65% risk of major adverse cardiac event in 6 weeks    I did discuss the heart score results with the patient.  We did discuss the risk stratification. I did discuss that the patient's risk based on the above scoring and their risk of coronary artery disease. The patient is comfortable being discharged home and following up with the appropriate physicians. This is shared decision making. They're to return to the nearest emergency room for any new or worsening symptoms. [TL]   1112 Chest Radiograph interpretation: No acute cardiopulmonary process.  No pneumothorax, widened mediastinum, pneumonia. [TL]   1245 Initial troponin negative.   Viral swabs negative.  Pregnancy test negative.  Brain atretic peptide negative. [TL]   1251 No acute cardiopulmonary process including pulmonary embolism on CT angio the chest. [TL]   1416 Repeat troponin negative.  Based on this today the patient is stable for discharge home at this time.  Patient's chest pain is completely resolved after Tylenol ministration. [TL]      ED Course User Index  [TL] Shay Paris DO         Diagnoses as of 01/27/24 1707   Chest pain, unspecified type       Medical Decision Making  Well-appearing 33-year-old female presented to the emergency room for evaluation of chest pressure.  No other significant associated symptoms.  Not obviously anginal or related to exertion or relieved with rest.  No radiation of the pain or other ACS related symptoms.  She is overall well-appearing.  No other recent viral illness that she endorses although this was asked.  She does use estrogen product and therefore cannot utilize PERC criteria to rule out pulmonary ballismus low risk patient.  Elevated D-dimer noted at greater than 890 and therefore CT angio to be obtained to rule out pulmonary embolism.  Low suspicion for pneumonia, pneumothorax and chest x-ray to be obtained.  Lungs are clear bilaterally and heart is regular rate and rhythm with no murmurs rubs or gallops.  Please see heart scoring ED course.  Tylenol ordered for pain control.        Procedure  Procedures     Shay Paris DO  01/27/24 1707

## 2024-02-01 ENCOUNTER — TELEPHONE (OUTPATIENT)
Dept: PRIMARY CARE | Facility: CLINIC | Age: 34
End: 2024-02-01

## 2024-02-01 ENCOUNTER — HOSPITAL ENCOUNTER (OUTPATIENT)
Dept: CARDIOLOGY | Facility: HOSPITAL | Age: 34
Discharge: HOME | End: 2024-02-01
Payer: COMMERCIAL

## 2024-02-01 ENCOUNTER — OFFICE VISIT (OUTPATIENT)
Dept: PRIMARY CARE | Facility: CLINIC | Age: 34
End: 2024-02-01
Payer: COMMERCIAL

## 2024-02-01 ENCOUNTER — TELEPHONE (OUTPATIENT)
Dept: OBSTETRICS AND GYNECOLOGY | Facility: CLINIC | Age: 34
End: 2024-02-01

## 2024-02-01 VITALS
HEART RATE: 87 BPM | BODY MASS INDEX: 31.58 KG/M2 | WEIGHT: 184 LBS | OXYGEN SATURATION: 98 % | DIASTOLIC BLOOD PRESSURE: 81 MMHG | SYSTOLIC BLOOD PRESSURE: 127 MMHG

## 2024-02-01 DIAGNOSIS — R60.0 BILATERAL LEG EDEMA: ICD-10-CM

## 2024-02-01 DIAGNOSIS — R60.0 BILATERAL LEG EDEMA: Primary | ICD-10-CM

## 2024-02-01 DIAGNOSIS — R07.9 CHEST PAIN, UNSPECIFIED TYPE: ICD-10-CM

## 2024-02-01 DIAGNOSIS — M79.662 PAIN OF LEFT CALF: ICD-10-CM

## 2024-02-01 PROCEDURE — 99214 OFFICE O/P EST MOD 30 MIN: CPT | Performed by: FAMILY MEDICINE

## 2024-02-01 PROCEDURE — 1036F TOBACCO NON-USER: CPT | Performed by: FAMILY MEDICINE

## 2024-02-01 PROCEDURE — 93970 EXTREMITY STUDY: CPT | Performed by: INTERNAL MEDICINE

## 2024-02-01 PROCEDURE — 93970 EXTREMITY STUDY: CPT

## 2024-02-01 ASSESSMENT — ENCOUNTER SYMPTOMS
COUGH: 0
SHORTNESS OF BREATH: 0
BACK PAIN: 0
NUMBNESS: 1
LEG PAIN: 1
TINGLING: 1

## 2024-02-01 NOTE — PROGRESS NOTES
Subjective   Patient ID: Fang Carvalho is a 33 y.o. female who presents for Chest Pain (Hospital follow up/Ruled out she does not have pulmonary embolism) and Leg Pain.    Chest Pain   This is a new problem. The problem has been gradually improving. The pain is at a severity of 1/10. Associated symptoms include leg pain and numbness. Pertinent negatives include no back pain, cough or shortness of breath.   Leg Pain   The incident occurred 2 days ago. The pain is present in the left leg and left thigh. The quality of the pain is described as aching. The pain is at a severity of 2/10. Associated symptoms include numbness and tingling.   She is here today for follow-up ER for chest pain  She went to the ER on 1/27/2024 with a 1 day history of chest pain.  This was a crushing chest pain which was located in her middle chest    I reviewed her records from ED  Troponin and BNP were negative.  She had testing for COVID and influenza which were negative  CBC and CMP were unremarkable  D-dimer was elevated at 892.  She subsequently underwent a CT angio chest which did not show any signs of PE  She was discharged home with recommendations to follow-up with PCP  She states that since discharge her chest pain is almost fully resolved.  She has not had any shortness of breath or palpitations.  Family history is significant for grandfather with heart disease which developed at an older age.  No history of similar symptoms in the past  She does mention that over the past day she has had some tingling in her left leg, as well as some aching which is mild in her left calf area.  No back pain.  This has improved today but is still present.  No leg weakness        Review of Systems   Respiratory:  Negative for cough and shortness of breath.    Cardiovascular:  Positive for chest pain and leg swelling.   Musculoskeletal:  Negative for back pain.   Neurological:  Positive for tingling and numbness.       Objective   /81   Pulse  87   Wt 83.5 kg (184 lb)   LMP 01/13/2024   SpO2 98%   BMI 31.58 kg/m²     Physical Exam  Vitals reviewed.   Constitutional:       General: She is not in acute distress.     Appearance: Normal appearance. She is well-developed.   HENT:      Head: Normocephalic.   Eyes:      Conjunctiva/sclera: Conjunctivae normal.   Cardiovascular:      Rate and Rhythm: Normal rate and regular rhythm.      Heart sounds: Normal heart sounds.      Comments: No reproducible chest wall tenderness  Pulmonary:      Effort: Pulmonary effort is normal.      Breath sounds: Normal breath sounds.   Musculoskeletal:      Right lower leg: Edema present.      Left lower leg: Edema present.      Comments: There is trace bilateral leg edema.  No erythema or warmth.  Mild tenderness left calf   Skin:     Findings: No rash.   Neurological:      Mental Status: She is alert.   Psychiatric:         Mood and Affect: Mood normal.         Behavior: Behavior normal.         Assessment/Plan   Problem List Items Addressed This Visit    None  Visit Diagnoses       Bilateral leg edema    -  Primary    Relevant Orders    Vascular US lower extremity venous duplex bilateral (Completed)    Chest pain, unspecified type        Pain of left calf            She presents today for follow-up ED 1/27/2024 for chest pain which is currently resolving.  I reviewed records from ED.  She had lab workup including troponin which was negative, and a CT scan of her chest which did not show any PE.  Since discharge her chest pain has been resolving.  Recommend follow-up if this continues to recur again in the future, and advised to return to ER immediately if any severe or worsening chest pain  Left leg pain: She does report paresthesias in her left leg and mild calf tenderness since yesterday.  She has trace bilateral leg edema as well as left calf tenderness on exam today, and did have elevated D-dimer in the ER at 892, so I ordered a bilateral lower extremity ultrasound to  rule out DVT    Addendum: The preliminary report on the ultrasound is negative for DVT.  We are still waiting on the report to be finalized by a physician.  I did discuss results with her over the phone.  She will notify us if not improving in the next week  Otherwise recommended scheduling a full CPE sometime in the next several months  Total time spent today was approximately 35 minutes.  This included time spent reviewing all of her records prior to appointment, time spent with patient, and time spent documenting once visit was complete

## 2024-02-01 NOTE — TELEPHONE ENCOUNTER
Pt had a  23. She has been taking  levonorgestreL-ethinyl estrad (Aviane, Drake, Lessina) 0.1-20 mg-mcg tablet since the baby was approx 6 wks old. She called stating that it is a lot for her to take a pill every day and is interested in getting a Mirena IUD. Insertion expectations reviewed. Pt was transferred to the  to schedule an appt.

## 2024-02-06 LAB
ATRIAL RATE: 78 BPM
P AXIS: 48 DEGREES
P OFFSET: 201 MS
P ONSET: 152 MS
PR INTERVAL: 130 MS
Q ONSET: 217 MS
QRS COUNT: 13 BEATS
QRS DURATION: 92 MS
QT INTERVAL: 372 MS
QTC CALCULATION(BAZETT): 424 MS
QTC FREDERICIA: 406 MS
R AXIS: 83 DEGREES
T AXIS: 28 DEGREES
T OFFSET: 403 MS
VENTRICULAR RATE: 78 BPM

## 2024-02-12 ENCOUNTER — PROCEDURE VISIT (OUTPATIENT)
Dept: OBSTETRICS AND GYNECOLOGY | Facility: CLINIC | Age: 34
End: 2024-02-12
Payer: COMMERCIAL

## 2024-02-12 VITALS
BODY MASS INDEX: 31.58 KG/M2 | HEIGHT: 64 IN | WEIGHT: 185 LBS | DIASTOLIC BLOOD PRESSURE: 68 MMHG | SYSTOLIC BLOOD PRESSURE: 124 MMHG

## 2024-02-12 DIAGNOSIS — Z97.5 IUD (INTRAUTERINE DEVICE) IN PLACE: ICD-10-CM

## 2024-02-12 DIAGNOSIS — Z30.430 ENCOUNTER FOR IUD INSERTION: ICD-10-CM

## 2024-02-12 DIAGNOSIS — Z30.09 ENCOUNTER FOR COUNSELING REGARDING CONTRACEPTION: Primary | ICD-10-CM

## 2024-02-12 LAB — PREGNANCY TEST URINE, POC: NEGATIVE

## 2024-02-12 PROCEDURE — 99213 OFFICE O/P EST LOW 20 MIN: CPT | Performed by: STUDENT IN AN ORGANIZED HEALTH CARE EDUCATION/TRAINING PROGRAM

## 2024-02-12 PROCEDURE — 58300 INSERT INTRAUTERINE DEVICE: CPT | Performed by: STUDENT IN AN ORGANIZED HEALTH CARE EDUCATION/TRAINING PROGRAM

## 2024-02-12 PROCEDURE — 81025 URINE PREGNANCY TEST: CPT | Performed by: STUDENT IN AN ORGANIZED HEALTH CARE EDUCATION/TRAINING PROGRAM

## 2024-02-12 RX ORDER — LEVONORGESTREL 52 MG/1
1 INTRAUTERINE DEVICE INTRAUTERINE ONCE
COMMUNITY

## 2024-02-12 ASSESSMENT — PAIN SCALES - GENERAL: PAINLEVEL: 0-NO PAIN

## 2024-02-12 NOTE — ASSESSMENT & PLAN NOTE
- Discussion held today regarding reproductive life planning and family planning, as well as optimization of health for future pregnancies. Autonomy of patient respected and confidentiality discussed. All currently available methods of contraception discussed, including temporary and permanent methods. Indications, risks, benefits, bleeding patterns, expectations, usage instructions, and efficacy of each reviewed. Informed patient that no hormonal methods of contraception prevent acquisition or transmission of STDs, and that condoms should be used for that purpose if it is relevant to her exposure risk. CDC guidelines for STD testing should be followed for routine testing, in addition to patient driven testing based on concerns, symptoms, and exposures. Ease of testing reviewed, and encouraged self-awareness of patient's own risks.   - Reproductive life-planning: Folic acid supplementation preconception, pregnancy spacing, medical condition optimization, medication and immunization review prior to any planned pregnancies, and preconception consult for certain high risk medical conditions all encouraged.  - Based on her current history, and based on [facility] guidelines, she is a candidate for all available methods except:  none  - Patient chooses to use:  LNG-IUD (Mirena)  - Reviewed instructions for initiation and continuation per current [facility]note 2[facility] SPR guidelines.  - Need for backup contraception reviewed based on current [facility] and SPR guidelines. Questions answered. Call parameters reviewed.

## 2024-02-12 NOTE — ASSESSMENT & PLAN NOTE
IUD inserted today without complications  She was advised to expect some cramping for next few days and to take NSAIDS PRN.  She was also advised to expect intermenstrual spotting or bleeding for next 1-3 months, as well as decreased menstrual flow.  Call for any pain other than cramping or for heavy vaginal bleeding.  String check in approx 8 weeks.

## 2024-02-12 NOTE — PROGRESS NOTES
Subjective   Patient ID: Fang Carvalho is a 33 y.o. female who presents for Follow-up (Discuss BP).    Pt reports episode of chest pain requiring ED visit and concern for PE/DVT - both ruled out, but she was concerned about impact of OCPs on this risk and wishes to discuss other methods of birth control.  She has been taking MALGORZATA since her last visit.     Objective   Physical Exam  Vitals reviewed. Exam conducted with a chaperone present.   Constitutional:       Appearance: Normal appearance.   HENT:      Head: Normocephalic.   Cardiovascular:      Rate and Rhythm: Normal rate and regular rhythm.   Pulmonary:      Effort: Pulmonary effort is normal. No respiratory distress.   Abdominal:      General: There is no distension.      Palpations: Abdomen is soft. There is no mass.      Tenderness: There is no abdominal tenderness. There is no guarding or rebound.   Genitourinary:     Comments: Normal appearing external female genitalia. Vulva without lesions. Vaginal mucosa normal appearing with physiologic discharge and no lesions. Cervix normal appearing without lesions.       Skin:     General: Skin is warm and dry.   Neurological:      General: No focal deficit present.      Mental Status: She is alert.   Psychiatric:         Mood and Affect: Mood normal.         Behavior: Behavior normal.         Thought Content: Thought content normal.         Judgment: Judgment normal.         IUD Insertion    Date/Time: 2/12/2024 9:37 AM    Performed by: David Paiz MD  Authorized by: David Paiz MD    Consent:     Consent obtained:  Written    Consent given by:  Patient  Comments:      IUD Insertion Procedure Note     The IUD insertion was explained and questions answered.  Patient wishes to proceed with Mirena IUD. The consent was signed.  Allergies verified. Speculum placed. Cervix was prepped with betadine and grasped with a single tooth tenaculum. The uterus sounded to 8 cm.  The IUD was placed with  sterile technique per the 's instructions.  The strings were trimmed to 4 cm. She tolerated the procedure well and no immediate complications were noted.          Assessment/Plan   Problem List Items Addressed This Visit             ICD-10-CM    Encounter for counseling regarding contraception - Primary Z30.09     - Discussion held today regarding reproductive life planning and family planning, as well as optimization of health for future pregnancies. Autonomy of patient respected and confidentiality discussed. All currently available methods of contraception discussed, including temporary and permanent methods. Indications, risks, benefits, bleeding patterns, expectations, usage instructions, and efficacy of each reviewed. Informed patient that no hormonal methods of contraception prevent acquisition or transmission of STDs, and that condoms should be used for that purpose if it is relevant to her exposure risk. CDC guidelines for STD testing should be followed for routine testing, in addition to patient driven testing based on concerns, symptoms, and exposures. Ease of testing reviewed, and encouraged self-awareness of patient's own risks.   - Reproductive life-planning: Folic acid supplementation preconception, pregnancy spacing, medical condition optimization, medication and immunization review prior to any planned pregnancies, and preconception consult for certain high risk medical conditions all encouraged.  - Based on her current history, and based on CDC [facility] guidelines, she is a candidate for all available methods except:  none  - Patient chooses to use:  LNG-IUD (Mirena)  - Reviewed instructions for initiation and continuation per current [facility] SPR guidelines.  - Need for backup contraception reviewed based on current [facility] and SPR guidelines. Questions answered. Call parameters reviewed.          IUD (intrauterine device) in place Z97.5     IUD inserted today without complications  She  was advised to expect some cramping for next few days and to take NSAIDS PRN.  She was also advised to expect intermenstrual spotting or bleeding for next 1-3 months, as well as decreased menstrual flow.  Call for any pain other than cramping or for heavy vaginal bleeding.  String check in approx 8 weeks.           Other Visit Diagnoses         Codes    Encounter for IUD insertion     Z30.430    Relevant Medications    levonorgestrel (Mirena) 21 mcg/24 hours (8 yrs) 52 mg IUD (Completed) (Start on 2/12/2024  9:45 AM)    Other Relevant Orders    POCT pregnancy, urine manually resulted (Completed)                 David Paiz MD 02/12/24 9:38 AM

## 2024-09-03 ENCOUNTER — OFFICE VISIT (OUTPATIENT)
Dept: PRIMARY CARE | Facility: CLINIC | Age: 34
End: 2024-09-03
Payer: COMMERCIAL

## 2024-09-03 VITALS
TEMPERATURE: 97.3 F | SYSTOLIC BLOOD PRESSURE: 123 MMHG | WEIGHT: 179 LBS | BODY MASS INDEX: 30.73 KG/M2 | DIASTOLIC BLOOD PRESSURE: 84 MMHG | HEART RATE: 86 BPM | OXYGEN SATURATION: 98 %

## 2024-09-03 DIAGNOSIS — H66.001 ACUTE SUPPURATIVE OTITIS MEDIA OF RIGHT EAR WITHOUT SPONTANEOUS RUPTURE OF TYMPANIC MEMBRANE, RECURRENCE NOT SPECIFIED: Primary | ICD-10-CM

## 2024-09-03 DIAGNOSIS — R00.1 BRADYCARDIA: ICD-10-CM

## 2024-09-03 DIAGNOSIS — Z00.00 ROUTINE HEALTH MAINTENANCE: ICD-10-CM

## 2024-09-03 PROCEDURE — 1036F TOBACCO NON-USER: CPT | Performed by: FAMILY MEDICINE

## 2024-09-03 PROCEDURE — 99213 OFFICE O/P EST LOW 20 MIN: CPT | Performed by: FAMILY MEDICINE

## 2024-09-03 RX ORDER — AMOXICILLIN AND CLAVULANATE POTASSIUM 875; 125 MG/1; MG/1
1 TABLET, FILM COATED ORAL 2 TIMES DAILY
Qty: 14 TABLET | Refills: 0 | Status: SHIPPED | OUTPATIENT
Start: 2024-09-03 | End: 2024-09-10

## 2024-09-03 RX ORDER — FLUOCINOLONE ACETONIDE 0.11 MG/ML
1-2 OIL AURICULAR (OTIC) EVERY 12 HOURS PRN
COMMUNITY
Start: 2024-03-18

## 2024-09-03 ASSESSMENT — ENCOUNTER SYMPTOMS
VOMITING: 0
RHINORRHEA: 0
SORE THROAT: 0
ABDOMINAL PAIN: 0
NECK PAIN: 0
HEADACHES: 0
DIARRHEA: 0
FEVER: 0
COUGH: 0

## 2024-09-03 NOTE — PROGRESS NOTES
Subjective   Patient ID: Fang Carvalho is a 34 y.o. female who presents for Earache.    Earache   There is pain in the right ear. This is a new problem. The current episode started in the past 7 days. The problem occurs constantly. The problem has been gradually worsening. There has been no fever. The pain is at a severity of 7/10. Associated symptoms include ear discharge. Pertinent negatives include no abdominal pain, coughing, diarrhea, headaches, hearing loss, neck pain, rash, rhinorrhea, sore throat or vomiting.      She has had on and off symptoms in her right ear for a while.  Over the past week she has had discomfort in her right ear.  She tried to flush out her right ear last night and it caused ear pain.  Has gotten better today but still present.  No left ear symptoms or sinusitis symptoms    She also mentions that since giving birth this past November, she has been getting low heart rate readings.  Her heart rate averages in the mid 50s, but will sometimes decrease to the 40s.  No dizziness but she has felt tired.  She reports that she has had 2 EKGs since symptoms started.  She had been seen in late January in the ER with chest pain which has since resolved.        Review of Systems   Constitutional:  Negative for fever.   HENT:  Positive for ear discharge and ear pain. Negative for hearing loss, rhinorrhea and sore throat.    Respiratory:  Negative for cough.    Gastrointestinal:  Negative for abdominal pain, diarrhea and vomiting.   Musculoskeletal:  Negative for neck pain.   Skin:  Negative for rash.   Neurological:  Negative for headaches.       Objective   /84   Pulse 86   Temp 36.3 °C (97.3 °F) (Temporal)   Wt 81.2 kg (179 lb)   SpO2 98%   BMI 30.73 kg/m²     Physical Exam  Vitals reviewed.   Constitutional:       General: She is not in acute distress.     Appearance: Normal appearance. She is well-developed.   HENT:      Head: Normocephalic.      Right Ear: Ear canal and external ear  normal.      Left Ear: Tympanic membrane, ear canal and external ear normal.      Ears:      Comments: Right tympanic membrane is bulging and erythematous with purulent effusion  Eyes:      Conjunctiva/sclera: Conjunctivae normal.   Cardiovascular:      Rate and Rhythm: Normal rate and regular rhythm.      Heart sounds: Normal heart sounds.   Pulmonary:      Effort: Pulmonary effort is normal.      Breath sounds: Normal breath sounds.   Skin:     Findings: No rash.   Neurological:      Mental Status: She is alert.   Psychiatric:         Mood and Affect: Mood normal.         Behavior: Behavior normal.         Assessment/Plan   Assessment & Plan  Acute suppurative otitis media of right ear without spontaneous rupture of tympanic membrane, recurrence not specified    Orders:    amoxicillin-pot clavulanate (Augmentin) 875-125 mg tablet; Take 1 tablet by mouth 2 times a day for 7 days.    CBC and Auto Differential; Future    Lipid Panel; Future    TSH with reflex to Free T4 if abnormal; Future    Comprehensive Metabolic Panel; Future    Routine health maintenance    Orders:    CBC and Auto Differential; Future    Lipid Panel; Future    TSH with reflex to Free T4 if abnormal; Future    Comprehensive Metabolic Panel; Future    Bradycardia    Orders:    CBC and Auto Differential; Future    Lipid Panel; Future    TSH with reflex to Free T4 if abnormal; Future    Comprehensive Metabolic Panel; Future    Referral to Cardiology; Future    We will treat with Augmentin twice daily x 7 days and also recommended starting over-the-counter Flonase.  Follow-up if symptoms or not resolving with antibiotic  We briefly discussed her bradycardia today.  We will plan on checking labs and refer to cardiology to discuss further.  She has a follow-up scheduled with me in 1 month and will discuss further at that time

## 2024-09-09 ENCOUNTER — OFFICE VISIT (OUTPATIENT)
Dept: PRIMARY CARE | Facility: CLINIC | Age: 34
End: 2024-09-09
Payer: COMMERCIAL

## 2024-09-09 ENCOUNTER — TELEPHONE (OUTPATIENT)
Dept: PRIMARY CARE | Facility: CLINIC | Age: 34
End: 2024-09-09

## 2024-09-09 VITALS
SYSTOLIC BLOOD PRESSURE: 111 MMHG | HEART RATE: 66 BPM | DIASTOLIC BLOOD PRESSURE: 77 MMHG | OXYGEN SATURATION: 98 % | TEMPERATURE: 97.2 F

## 2024-09-09 DIAGNOSIS — H66.001 ACUTE SUPPURATIVE OTITIS MEDIA OF RIGHT EAR WITHOUT SPONTANEOUS RUPTURE OF TYMPANIC MEMBRANE, RECURRENCE NOT SPECIFIED: ICD-10-CM

## 2024-09-09 PROCEDURE — 1036F TOBACCO NON-USER: CPT | Performed by: FAMILY MEDICINE

## 2024-09-09 PROCEDURE — 99213 OFFICE O/P EST LOW 20 MIN: CPT | Performed by: FAMILY MEDICINE

## 2024-09-09 RX ORDER — AMOXICILLIN AND CLAVULANATE POTASSIUM 875; 125 MG/1; MG/1
1 TABLET, FILM COATED ORAL 2 TIMES DAILY
Qty: 10 TABLET | Refills: 0 | Status: SHIPPED | OUTPATIENT
Start: 2024-09-09 | End: 2024-09-14

## 2024-09-09 ASSESSMENT — ENCOUNTER SYMPTOMS: FEVER: 0

## 2024-09-09 NOTE — TELEPHONE ENCOUNTER
Pt was seen last week for ear infection. Today is her last day of antibiotics but she's having lingering symptoms with some yellow ear drainage. Please advise.

## 2024-09-09 NOTE — PROGRESS NOTES
-Subjective   Patient ID: Fang Carvalho is a 34 y.o. female who presents for Earache (Right ear yellow drainage. ).    HPI   She is here today to follow-up on AOM  She was seen 1 week ago with right ear symptoms.  At that time she had a 1 week history of worsening discomfort in her right ear, as well as pain.  She was found to have right AOM on exam and given a prescription for Augmentin twice daily x 7 days  She states that the right ear pain has gotten better, but her right ear has continued to feel full and she has had intermittent yellow drainage from her ear.  She will have pain when she lays on her side  No left ear symptoms.  No fever or sinusitis symptoms      Review of Systems   Constitutional:  Negative for fever.   HENT:  Positive for ear pain. Negative for congestion.        Objective   /77   Pulse 66   Temp 36.2 °C (97.2 °F) (Temporal)   SpO2 98%     Physical Exam  Vitals reviewed.   Constitutional:       General: She is not in acute distress.     Appearance: Normal appearance.   HENT:      Head: Normocephalic.      Right Ear: Ear canal and external ear normal.      Left Ear: Tympanic membrane, ear canal and external ear normal.      Ears:      Comments: Right tympanic membrane is bulging with dull erythema and purulent effusion present     Nose: Nose normal.      Mouth/Throat:      Mouth: Mucous membranes are moist.      Pharynx: No oropharyngeal exudate or posterior oropharyngeal erythema.   Eyes:      Conjunctiva/sclera: Conjunctivae normal.   Cardiovascular:      Rate and Rhythm: Normal rate and regular rhythm.      Heart sounds: Normal heart sounds.   Pulmonary:      Effort: Pulmonary effort is normal.      Breath sounds: Normal breath sounds.   Lymphadenopathy:      Cervical: No cervical adenopathy.   Skin:     Findings: No rash.   Neurological:      Mental Status: She is alert.   Psychiatric:         Mood and Affect: Mood normal.         Behavior: Behavior normal.          Assessment/Plan   Assessment & Plan  Acute suppurative otitis media of right ear without spontaneous rupture of tympanic membrane, recurrence not specified    Orders:    amoxicillin-pot clavulanate (Augmentin) 875-125 mg tablet; Take 1 tablet by mouth 2 times a day for 5 days.    Her right TM today appears to be less erythematous than last visit, however she is still having right ear pain, and the TM is still bulging with purulent effusion.  Since this has been improving with Augmentin, we will treat with an additional 5 days.  I also recommended that she start using over-the-counter Flonase and an over-the-counter antihistamine such as Claritin or Zyrtec.  Call or follow-up in 1 week if this has not resolved completely

## 2024-09-10 ENCOUNTER — LAB (OUTPATIENT)
Dept: LAB | Facility: LAB | Age: 34
End: 2024-09-10
Payer: COMMERCIAL

## 2024-09-10 DIAGNOSIS — R00.1 BRADYCARDIA: ICD-10-CM

## 2024-09-10 DIAGNOSIS — H66.001 ACUTE SUPPURATIVE OTITIS MEDIA OF RIGHT EAR WITHOUT SPONTANEOUS RUPTURE OF TYMPANIC MEMBRANE, RECURRENCE NOT SPECIFIED: ICD-10-CM

## 2024-09-10 DIAGNOSIS — Z00.00 ROUTINE HEALTH MAINTENANCE: ICD-10-CM

## 2024-09-10 LAB
ALBUMIN SERPL BCP-MCNC: 4.5 G/DL (ref 3.4–5)
ALP SERPL-CCNC: 58 U/L (ref 33–110)
ALT SERPL W P-5'-P-CCNC: 13 U/L (ref 7–45)
ANION GAP SERPL CALC-SCNC: 11 MMOL/L (ref 10–20)
AST SERPL W P-5'-P-CCNC: 16 U/L (ref 9–39)
BASOPHILS # BLD AUTO: 0.06 X10*3/UL (ref 0–0.1)
BASOPHILS NFR BLD AUTO: 0.7 %
BILIRUB SERPL-MCNC: 0.3 MG/DL (ref 0–1.2)
BUN SERPL-MCNC: 11 MG/DL (ref 6–23)
CALCIUM SERPL-MCNC: 9.5 MG/DL (ref 8.6–10.3)
CHLORIDE SERPL-SCNC: 107 MMOL/L (ref 98–107)
CHOLEST SERPL-MCNC: 111 MG/DL (ref 0–199)
CHOLESTEROL/HDL RATIO: 2.5
CO2 SERPL-SCNC: 28 MMOL/L (ref 21–32)
CREAT SERPL-MCNC: 0.75 MG/DL (ref 0.5–1.05)
EGFRCR SERPLBLD CKD-EPI 2021: >90 ML/MIN/1.73M*2
EOSINOPHIL # BLD AUTO: 0.2 X10*3/UL (ref 0–0.7)
EOSINOPHIL NFR BLD AUTO: 2.2 %
ERYTHROCYTE [DISTWIDTH] IN BLOOD BY AUTOMATED COUNT: 13.2 % (ref 11.5–14.5)
GLUCOSE SERPL-MCNC: 88 MG/DL (ref 74–99)
HCT VFR BLD AUTO: 42 % (ref 36–46)
HDLC SERPL-MCNC: 44.2 MG/DL
HGB BLD-MCNC: 13.7 G/DL (ref 12–16)
IMM GRANULOCYTES # BLD AUTO: 0.04 X10*3/UL (ref 0–0.7)
IMM GRANULOCYTES NFR BLD AUTO: 0.4 % (ref 0–0.9)
LDLC SERPL CALC-MCNC: 52 MG/DL
LYMPHOCYTES # BLD AUTO: 3.14 X10*3/UL (ref 1.2–4.8)
LYMPHOCYTES NFR BLD AUTO: 34.7 %
MCH RBC QN AUTO: 27.6 PG (ref 26–34)
MCHC RBC AUTO-ENTMCNC: 32.6 G/DL (ref 32–36)
MCV RBC AUTO: 85 FL (ref 80–100)
MONOCYTES # BLD AUTO: 0.52 X10*3/UL (ref 0.1–1)
MONOCYTES NFR BLD AUTO: 5.8 %
NEUTROPHILS # BLD AUTO: 5.08 X10*3/UL (ref 1.2–7.7)
NEUTROPHILS NFR BLD AUTO: 56.2 %
NON HDL CHOLESTEROL: 67 MG/DL (ref 0–149)
NRBC BLD-RTO: 0 /100 WBCS (ref 0–0)
PLATELET # BLD AUTO: 290 X10*3/UL (ref 150–450)
POTASSIUM SERPL-SCNC: 4.7 MMOL/L (ref 3.5–5.3)
PROT SERPL-MCNC: 7 G/DL (ref 6.4–8.2)
RBC # BLD AUTO: 4.96 X10*6/UL (ref 4–5.2)
SODIUM SERPL-SCNC: 141 MMOL/L (ref 136–145)
TRIGL SERPL-MCNC: 72 MG/DL (ref 0–149)
TSH SERPL-ACNC: 0.71 MIU/L (ref 0.44–3.98)
VLDL: 14 MG/DL (ref 0–40)
WBC # BLD AUTO: 9 X10*3/UL (ref 4.4–11.3)

## 2024-09-10 PROCEDURE — 80061 LIPID PANEL: CPT

## 2024-09-10 PROCEDURE — 36415 COLL VENOUS BLD VENIPUNCTURE: CPT

## 2024-09-10 PROCEDURE — 84443 ASSAY THYROID STIM HORMONE: CPT

## 2024-09-10 PROCEDURE — 80053 COMPREHEN METABOLIC PANEL: CPT

## 2024-09-10 PROCEDURE — 85025 COMPLETE CBC W/AUTO DIFF WBC: CPT

## 2024-09-16 ENCOUNTER — TELEPHONE (OUTPATIENT)
Dept: PRIMARY CARE | Facility: CLINIC | Age: 34
End: 2024-09-16
Payer: COMMERCIAL

## 2024-09-16 DIAGNOSIS — H66.001 ACUTE SUPPURATIVE OTITIS MEDIA OF RIGHT EAR WITHOUT SPONTANEOUS RUPTURE OF TYMPANIC MEMBRANE, RECURRENCE NOT SPECIFIED: Primary | ICD-10-CM

## 2024-09-16 RX ORDER — CEFDINIR 300 MG/1
300 CAPSULE ORAL 2 TIMES DAILY
Qty: 14 CAPSULE | Refills: 0 | Status: SHIPPED | OUTPATIENT
Start: 2024-09-16 | End: 2024-09-23

## 2024-09-16 NOTE — TELEPHONE ENCOUNTER
I spoke with her over the phone.  Her left ear is much better but she is still having symptoms in her right ear.  She is not having much pain, but is having difficulty hearing.  She has completed the course of Augmentin for otitis media.  We discussed having her stop by the office so I can take another look at her ear, versus a different antibiotic and she would prefer to treat with a different antibiotic.  I sent in cefdinir twice daily x 7 days.  Recommended following up in the office in the next 3-5 days if not improving

## 2024-09-20 ENCOUNTER — APPOINTMENT (OUTPATIENT)
Dept: CARDIOLOGY | Facility: CLINIC | Age: 34
End: 2024-09-20
Payer: COMMERCIAL

## 2024-09-20 VITALS
OXYGEN SATURATION: 98 % | WEIGHT: 177.4 LBS | RESPIRATION RATE: 18 BRPM | SYSTOLIC BLOOD PRESSURE: 114 MMHG | BODY MASS INDEX: 30.29 KG/M2 | DIASTOLIC BLOOD PRESSURE: 62 MMHG | HEIGHT: 64 IN | HEART RATE: 82 BPM

## 2024-09-20 DIAGNOSIS — R53.83 FATIGUE, UNSPECIFIED TYPE: ICD-10-CM

## 2024-09-20 DIAGNOSIS — M45.9 ANKYLOSING SPONDYLITIS, UNSPECIFIED SITE OF SPINE (MULTI): ICD-10-CM

## 2024-09-20 DIAGNOSIS — R00.1 BRADYCARDIA: Primary | ICD-10-CM

## 2024-09-20 PROCEDURE — 1036F TOBACCO NON-USER: CPT | Performed by: NURSE PRACTITIONER

## 2024-09-20 PROCEDURE — 93000 ELECTROCARDIOGRAM COMPLETE: CPT | Performed by: NURSE PRACTITIONER

## 2024-09-20 PROCEDURE — 3008F BODY MASS INDEX DOCD: CPT | Performed by: NURSE PRACTITIONER

## 2024-09-20 PROCEDURE — 99204 OFFICE O/P NEW MOD 45 MIN: CPT | Performed by: NURSE PRACTITIONER

## 2024-09-20 NOTE — PROGRESS NOTES
Name : Fang Carvalho   : 1990   MRN : [id number]   ENC Date : 2024    CC:   NPV- Bradycardia and Fatigue     HPI:    Fang Carvalho is a 34 y.o. female with PMHx sig for Ankylosing spondylitis, Raynaud, Bipolar Disorder & PCOS who presents today as above.    Reports that after the birth of her daughter, Yolanda, in 2023 she had a low HR into the 40s & fatigue. HR recovered after 1 week.    2 weeks ago started to feel really tired, started wearing apple watch, noticing HR 40s while sitting & working. Reports increase in fatigue. Increases with activity.     Reports that she has had an ear infection for the past 2 weeks for which she is on antibiotics.    Activity consists of chasing her daughter around which does not cause any SOB or CP.    ER 25 for c/o chest pain, elevated D-dimer but no PE & cardiac workup was unrevealing    4 cups of coffee, 2 diet pepsi & 30oz bottle of water a day    CV Diagnostics:  EKG today shows Sinus Rhythm, HR 80 bpm    ROS: unless otherwise noted in the history of present illness, all other systems were reviewed and they are negative for complaints     PMH: as above    PSH:  She has a past surgical history that includes Other surgical history (2023) and Fracture surgery (2014).    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    FHx:  Family History   Problem Relation Name Age of Onset    Skin cancer Mother Neil Strange     Hypertension Mother Neil Strange     Ankylosing spondylitis Father Neil Strange     Hypertension Father Neil Strange     Skin cancer Father Neil Strange     Arthritis Father Neil Strange     Cancer Father Neil Strange     Skin cancer Sister        Allergies:  Biaxin [clarithromycin]    Outpatient Medications:  Current Outpatient Medications   Medication Instructions    cariprazine (VRAYLAR) 3 mg, oral, Daily    cefdinir (OMNICEF) 300 mg, oral,  "2 times daily    fluocinolone (DermOtic) 0.01 % ear drops 1-2 drops, miscellaneous, Every 12 hours PRN    levonorgestrel (Mirena) 21 mcg/24 hours (8 yrs) 52 mg IUD 1 each, intrauterine, Once, IN 2/12/24<BR>OUT 2/32    Simponi 100 mg, subcutaneous, Every 30 days     Last Recorded Vitals:  Vitals:    09/20/24 1304   BP: 114/62   BP Location: Left arm   Patient Position: Sitting   Pulse: 82   Resp: 18   SpO2: 98%   Weight: 80.5 kg (177 lb 6.4 oz)   Height: 1.626 m (5' 4\")     Physical Exam:  On exam Ms. Fang Carvalho appears her stated age, is alert and oriented x3, and in no acute distress. Her sclera are anicteric and her oropharynx has moist mucous membranes. Her neck is supple and without thyromegaly. The JVP is ~5 cm of water above the right atrium. Her cardiac exam has regular rhythm, normal S1, S2. No S3/4. There are no murmurs. Her lungs are clear to auscultation bilaterally and there is no dullness to percussion. Her abdomen is soft, nontender with normoactive bowel sounds. There is no HJR. The extremities are warm and without edema. The skin is dry. There is no rash present. The distal pulses are 2-3+ in all four extremities. Her mood and affect are appropriate for todays encounter.      Last Labs:  CBC -  Lab Results   Component Value Date    WBC 9.0 09/10/2024    HGB 13.7 09/10/2024    HCT 42.0 09/10/2024    MCV 85 09/10/2024     09/10/2024       CMP -  Lab Results   Component Value Date    CALCIUM 9.5 09/10/2024    PROT 7.0 09/10/2024    ALBUMIN 4.5 09/10/2024    AST 16 09/10/2024    ALT 13 09/10/2024    ALKPHOS 58 09/10/2024    BILITOT 0.3 09/10/2024       LIPID PANEL -   Lab Results   Component Value Date    CHOL 111 09/10/2024    TRIG 72 09/10/2024    HDL 44.2 09/10/2024    CHHDL 2.5 09/10/2024    VLDL 14 09/10/2024    NHDL 67 09/10/2024       RENAL FUNCTION PANEL -   Lab Results   Component Value Date    GLUCOSE 88 09/10/2024     09/10/2024    K 4.7 09/10/2024     09/10/2024    CO2 " 28 09/10/2024    ANIONGAP 11 09/10/2024    BUN 11 09/10/2024    CREATININE 0.75 09/10/2024    CALCIUM 9.5 09/10/2024    ALBUMIN 4.5 09/10/2024        Lab Results   Component Value Date    BNP 9 01/27/2024    HGBA1C 5.0 01/09/2023    HGBA1C 5.1 10/04/2021     I have reviewed the above labs & diagnostics    Assessment/Plan:  Inappropriate bradycardia  Fatigue  Ankylosing spondylitis     Will get 1 week event monitor as symptoms occur daily to r/o AV viviane dysfunction or other arrhythmias. Given her history of Ankylosing spondylitis will get echocardiogram to assess structure & function    Follow up after testing      Tracy M Schwab, OSBALDO-CNP

## 2024-09-20 NOTE — PATIENT INSTRUCTIONS
- 1 week event monitor to rule out any abnormal rhythms   - Echocardiogram (ultrasound of your heart) to assess the function as well as for any valve abnormalities   - virtual follow up with me after to review results    It was my pleasure to meet you. I look forward to being your cardiac Nurse Practitioner. I am a huge believer in communicating with my patients. Please contact me at any time, if anything is not clear to you regarding anything we have discussed, or if new questions occur to you.

## 2024-09-26 ENCOUNTER — HOSPITAL ENCOUNTER (OUTPATIENT)
Dept: CARDIOLOGY | Facility: HOSPITAL | Age: 34
Discharge: HOME | End: 2024-09-26
Payer: COMMERCIAL

## 2024-09-26 DIAGNOSIS — R00.1 BRADYCARDIA, UNSPECIFIED: ICD-10-CM

## 2024-09-26 DIAGNOSIS — M45.9 ANKYLOSING SPONDYLITIS, UNSPECIFIED SITE OF SPINE (MULTI): ICD-10-CM

## 2024-09-26 PROCEDURE — 93306 TTE W/DOPPLER COMPLETE: CPT | Performed by: INTERNAL MEDICINE

## 2024-09-26 PROCEDURE — 93306 TTE W/DOPPLER COMPLETE: CPT

## 2024-09-27 LAB
AORTIC VALVE PEAK VELOCITY: 1.26 M/S
AV PEAK GRADIENT: 6.4 MMHG
AVA (PEAK VEL): 2.22 CM2
EJECTION FRACTION APICAL 4 CHAMBER: 60.5
EJECTION FRACTION: 59 %
LEFT VENTRICLE INTERNAL DIMENSION DIASTOLE: 4.74 CM (ref 3.5–6)
LEFT VENTRICULAR OUTFLOW TRACT DIAMETER: 2.03 CM
LV EJECTION FRACTION BIPLANE: 59 %
RIGHT VENTRICLE FREE WALL PEAK S': 11 CM/S
RIGHT VENTRICLE PEAK SYSTOLIC PRESSURE: 22.4 MMHG
TRICUSPID ANNULAR PLANE SYSTOLIC EXCURSION: 1.8 CM

## 2024-10-02 ENCOUNTER — APPOINTMENT (OUTPATIENT)
Dept: PRIMARY CARE | Facility: CLINIC | Age: 34
End: 2024-10-02
Payer: COMMERCIAL

## 2024-10-07 ENCOUNTER — HOSPITAL ENCOUNTER (OUTPATIENT)
Dept: CARDIOLOGY | Facility: HOSPITAL | Age: 34
Discharge: HOME | End: 2024-10-07
Payer: COMMERCIAL

## 2024-10-07 ENCOUNTER — TELEPHONE (OUTPATIENT)
Dept: PRIMARY CARE | Facility: CLINIC | Age: 34
End: 2024-10-07
Payer: COMMERCIAL

## 2024-10-07 DIAGNOSIS — R00.1 BRADYCARDIA: ICD-10-CM

## 2024-10-07 DIAGNOSIS — R53.83 FATIGUE, UNSPECIFIED TYPE: ICD-10-CM

## 2024-10-07 PROCEDURE — 93242 EXT ECG>48HR<7D RECORDING: CPT

## 2024-10-08 ENCOUNTER — OFFICE VISIT (OUTPATIENT)
Dept: PRIMARY CARE | Facility: CLINIC | Age: 34
End: 2024-10-08
Payer: COMMERCIAL

## 2024-10-08 VITALS
HEART RATE: 76 BPM | TEMPERATURE: 97.9 F | SYSTOLIC BLOOD PRESSURE: 124 MMHG | OXYGEN SATURATION: 99 % | DIASTOLIC BLOOD PRESSURE: 83 MMHG

## 2024-10-08 DIAGNOSIS — H92.11 OTORRHEA OF RIGHT EAR: Primary | ICD-10-CM

## 2024-10-08 PROCEDURE — 1036F TOBACCO NON-USER: CPT | Performed by: FAMILY MEDICINE

## 2024-10-08 PROCEDURE — 99213 OFFICE O/P EST LOW 20 MIN: CPT | Performed by: FAMILY MEDICINE

## 2024-10-08 RX ORDER — DOXYCYCLINE 100 MG/1
100 CAPSULE ORAL 2 TIMES DAILY
Qty: 20 CAPSULE | Refills: 0 | Status: SHIPPED | OUTPATIENT
Start: 2024-10-08 | End: 2024-10-18

## 2024-10-08 ASSESSMENT — ENCOUNTER SYMPTOMS: FEVER: 0

## 2024-10-08 NOTE — PROGRESS NOTES
Subjective   Patient ID: Fang Carvalho is a 34 y.o. female who presents for Ear Drainage.    Ear Drainage   There is pain in the right ear. Associated symptoms include ear discharge and hearing loss.        She is here today to follow-up on acute otitis media  She was initially seen on 9/3 with a 1 week history of pain in her right ear and treated with Augmentin for acute otitis media  I spoke with her on 9/16.  At that time she had continued to have symptoms in her right ear and I called in cefdinir for her.  Her right ear symptoms had improved with this antibiotic.  She is no longer having any pain, but is still having muffled hearing.  She has also been having thick yellow drainage from her right ear  No left ear symptoms.  No sinus pressure or URI symptoms        Review of Systems   Constitutional:  Negative for fever.   HENT:  Positive for ear discharge and hearing loss.        Objective   /83   Pulse 76   Temp 36.6 °C (97.9 °F) (Temporal)   SpO2 99%     Physical Exam  Vitals reviewed.   Constitutional:       General: She is not in acute distress.     Appearance: Normal appearance.   HENT:      Head: Normocephalic.      Right Ear: Tympanic membrane, ear canal and external ear normal.      Ears:      Comments: There is a large amount of thin, white-yellow otorrhea in the EAC.  No EAC erythema or tenderness.  There is a serous effusion behind the TM but no TM erythema.  Due to large amount of otorrhea in the ear canal, I was not able to fully visualize the entire TM     Nose: Nose normal.      Mouth/Throat:      Mouth: Mucous membranes are moist.      Pharynx: No oropharyngeal exudate or posterior oropharyngeal erythema.   Eyes:      Conjunctiva/sclera: Conjunctivae normal.   Cardiovascular:      Rate and Rhythm: Normal rate and regular rhythm.      Heart sounds: Normal heart sounds.   Pulmonary:      Effort: Pulmonary effort is normal.      Breath sounds: Normal breath sounds.   Lymphadenopathy:       Cervical: No cervical adenopathy.   Skin:     Findings: No rash.   Neurological:      Mental Status: She is alert.   Psychiatric:         Mood and Affect: Mood normal.         Behavior: Behavior normal.         Assessment/Plan   Assessment & Plan  Otorrhea of right ear    Orders:    Referral to ENT; Future    doxycycline (Vibramycin) 100 mg capsule; Take 1 capsule (100 mg) by mouth 2 times a day for 10 days. Take with at least 8 ounces (large glass) of water, do not lie down for 30 minutes after    She presents today with persistent otorrhea from her right ear, which developed in the setting of recent acute otitis media which was treated with Augmentin followed by cefdinir last month.  On exam today she does not have any signs of otitis externa.  There is no tympanic membrane erythema, however I could not visualize the entire TM due to large amount of otorrhea in ear canal, so I could not determine whether or not a tympanic membrane perforation is present or not.  We will start treatment with doxycycline, and refer her to see ENT.  Follow-up if any persistent symptoms after seeing ENT

## 2024-10-11 ENCOUNTER — APPOINTMENT (OUTPATIENT)
Dept: PRIMARY CARE | Facility: CLINIC | Age: 34
End: 2024-10-11
Payer: COMMERCIAL

## 2024-10-17 ENCOUNTER — CLINICAL SUPPORT (OUTPATIENT)
Dept: AUDIOLOGY | Facility: CLINIC | Age: 34
End: 2024-10-17
Payer: COMMERCIAL

## 2024-10-17 ENCOUNTER — APPOINTMENT (OUTPATIENT)
Dept: OTOLARYNGOLOGY | Facility: CLINIC | Age: 34
End: 2024-10-17
Payer: COMMERCIAL

## 2024-10-17 VITALS
SYSTOLIC BLOOD PRESSURE: 113 MMHG | DIASTOLIC BLOOD PRESSURE: 75 MMHG | WEIGHT: 177 LBS | HEIGHT: 64 IN | BODY MASS INDEX: 30.22 KG/M2

## 2024-10-17 DIAGNOSIS — H90.11 CONDUCTIVE HEARING LOSS OF RIGHT EAR WITH UNRESTRICTED HEARING OF LEFT EAR: Primary | ICD-10-CM

## 2024-10-17 DIAGNOSIS — H72.91 PERFORATION OF RIGHT TYMPANIC MEMBRANE: ICD-10-CM

## 2024-10-17 DIAGNOSIS — H66.91 RIGHT ACUTE OTITIS MEDIA: ICD-10-CM

## 2024-10-17 DIAGNOSIS — H92.11 OTORRHEA OF RIGHT EAR: ICD-10-CM

## 2024-10-17 DIAGNOSIS — H93.8X1 EAR FULLNESS, RIGHT: ICD-10-CM

## 2024-10-17 DIAGNOSIS — H60.391 OTHER INFECTIVE ACUTE OTITIS EXTERNA OF RIGHT EAR: ICD-10-CM

## 2024-10-17 DIAGNOSIS — H61.23 BILATERAL IMPACTED CERUMEN: ICD-10-CM

## 2024-10-17 PROCEDURE — 69210 REMOVE IMPACTED EAR WAX UNI: CPT

## 2024-10-17 PROCEDURE — 87186 SC STD MICRODIL/AGAR DIL: CPT

## 2024-10-17 PROCEDURE — 3008F BODY MASS INDEX DOCD: CPT

## 2024-10-17 PROCEDURE — 99203 OFFICE O/P NEW LOW 30 MIN: CPT

## 2024-10-17 PROCEDURE — 1036F TOBACCO NON-USER: CPT

## 2024-10-17 PROCEDURE — 87075 CULTR BACTERIA EXCEPT BLOOD: CPT

## 2024-10-17 PROCEDURE — 87205 SMEAR GRAM STAIN: CPT

## 2024-10-17 PROCEDURE — 87070 CULTURE OTHR SPECIMN AEROBIC: CPT

## 2024-10-17 PROCEDURE — 87077 CULTURE AEROBIC IDENTIFY: CPT

## 2024-10-17 PROCEDURE — 92550 TYMPANOMETRY & REFLEX THRESH: CPT | Performed by: AUDIOLOGIST

## 2024-10-17 PROCEDURE — 92557 COMPREHENSIVE HEARING TEST: CPT | Performed by: AUDIOLOGIST

## 2024-10-17 RX ORDER — CIPROFLOXACIN AND DEXAMETHASONE 3; 1 MG/ML; MG/ML
4 SUSPENSION/ DROPS AURICULAR (OTIC) 2 TIMES DAILY
Qty: 7.5 ML | Refills: 0 | Status: SHIPPED | OUTPATIENT
Start: 2024-10-17 | End: 2024-10-27

## 2024-10-17 RX ORDER — AMOXICILLIN AND CLAVULANATE POTASSIUM 875; 125 MG/1; MG/1
1 TABLET, FILM COATED ORAL 2 TIMES DAILY
Qty: 20 TABLET | Refills: 0 | Status: SHIPPED | OUTPATIENT
Start: 2024-10-17 | End: 2024-10-27

## 2024-10-17 ASSESSMENT — PAIN - FUNCTIONAL ASSESSMENT: PAIN_FUNCTIONAL_ASSESSMENT: 0-10

## 2024-10-17 ASSESSMENT — PAIN SCALES - GENERAL: PAINLEVEL_OUTOF10: 0 - NO PAIN

## 2024-10-17 ASSESSMENT — ENCOUNTER SYMPTOMS: OCCASIONAL FEELINGS OF UNSTEADINESS: 0

## 2024-10-17 NOTE — PROGRESS NOTES
AUDIOLOGY ADULT AUDIOMETRIC EVALUATION      Name:  Fang Carvalho  :  1990  Age:  34 y.o.  Date of Evaluation: 10/17/24    History:  Reason for visit:  Ms. Fang Carvalho was seen today as part of the visit with CYNTHIA Tom for an evaluation of hearing.   Chief Complaint   Patient presents with    Hearing Problem    Ear Fullness    Ear Pressure     The patient stated she experienced a right sided ear infection near the beginning of September. Stated she has completed a round of antibiotics as well as ear drops and initially noticed some improvement, then mentioned the symptoms returned.   Stated she has noticed some drainage from the right ear, in additional to a sensation of right sided fullness and ear pressure. Mentioned she has experienced a desire to pop the right ear with the inability to do so.   She noticed some slight off-balance sensations with movements, however, stated this has lessened and she denied any significant dizziness or vertigo.   Reported the hearing in the right ear feels muffled and diminished, however, she has still been able to communicate.   Denied any otalgia, tinnitus, ear surgery, recent falls, significant noise exposure, sinus/throat concerns,  or sudden hearing loss.    EVALUATION     See Audiogram    RESULTS:    Otoscopic Evaluation:   Right Ear: Otoscopy revealed deep soft partially occluding cerumen/debris/infection/drainage and the tympanic membrane was unable able to be clearly visualized.   Left Ear: Otoscopy revealed deep soft partially occluding cerumen and the tympanic membrane was unable able to be clearly visualized.     Immittance:  Immittance Measures: 226 Hz   Right Ear: Tympanometric testing revealed a type B flat tympanogram with no measurable middle ear pressure or static compliance and normal ear canal volume. Results may be consistent with excessive debris in the ear canal or middle ear effusion.   Left Ear: Tympanometric testing revealed a  normal type A tympanogram with normal middle ear pressure and normal static compliance.    Right Ear: Ipsilateral acoustic reflexes were absent at, 500-4,000 Hz. Results are consistent with the test results.   Left Ear: Ipsilateral acoustic reflexes were present at, 500-4,000 Hz, at expected sensation levels.    Test technique:  Pure Tone Audiometry via TDH headphones    Reliability:   excellent    Pure Tone Audiometry:    Right Ear: Audiometric testing indicated a slight conductive hearing loss through 2,000 Hz, sloping to a mild to moderate conductive hearing loss above.   Left Ear:   Audiometric testing indicated normal peripheral hearing sensitivity through 6,000 Hz, sloping to a mild hearing loss above.       Speech Audiometry:   Right Ear:  Speech Reception Threshold (SRT) was obtained at 30 dBHL                  Word Recognition scores were excellent (100%) in quiet when words were presented at 70 dBHL  Left Ear:  Speech Reception Threshold (SRT) was obtained at  15 dBHL                  Word Recognition scores were excellent (100%) in quiet when words were presented at 45 dBHL  Testing was performed with recorded NU-6 speech words in quiet. Speech thresholds were in good agreement with the pure tone averages in each ear.     IMPRESSIONS:  Today's test results are hearing loss requiring medical/otologic follow-up.  The patient was counseled with regard to the findings.    RECOMMENDATIONS:  * Continue medical follow up with CYNTHIA Tom.  * Retest as medically indicated, or sooner if a change in hearing sensitivity is noticed.   * Wear hearing protection while in the presence of loud sounds.   * Use effective communication strategies such as asking the speaker to gain attention prior to speaking, speaking in the same room, repeating words that were heard, etc.    PATIENT EDUCATION:   Discussed results and recommendations with the patient and a copy of the hearing test was provided.  Questions  were addressed and the patient was encouraged to contact our department should concerns arise.  The patient was seen from  9:30-10:00 am.

## 2024-10-17 NOTE — PROGRESS NOTES
Patient ID: Fang Carvalho is a 34 y.o. female who presents for the evaluation of right otorrhea and right hearing loss. They present as a referral from Dr. Raymond Zamora (PCP).    PROVIDER IMPRESSIONS:  DIAGNOSES/PROBLEMS:  -Right acute otitis media  -Right acute otitis externa  -Otorrhea of the right ear  -Conductive hearing loss of the right ear, unrestricted hearing of the left ear  -Perforation of right tympanic membrane  -Bilateral cerumen impaction    ASSESSMENT:   Fang Carvalho is a pleasant 34 y.o. female who presents with symptoms of right otorrhea and right hearing loss. Based on the clinical information provided, symptoms and clinical exam findings are consistent with right AOM with spontaneous TM rupture, right AOE, and bilateral cerumen impaction. I obtained a culture of right EAC otorrhea today. Using appropriate instrumentation, impacted cerumen and copious purulent debris was successfully removed from the EAC on the right, and impacted cerumen was successfully removed from the EAC on the left. Otologic exam today revealed a central/superior right TM perforation (30%) with pulsating purulent drainage coming from the middle ear space. Reassurance provided to patient that exam today showed no evidence of acute infection or inflammation in the left EAC and that left TM appears with no evidence of infection, effusion, retraction or perforation.  Audiogram reviewed in detail with the patient, which revealed mild to moderate conductive hearing loss in and type B tympanogram in the right ear consistent with perforation and infection and unrestricted hearing in the left ear.     PLAN:  I recommended Ciprodex otic drops with 4 drops into the right ear twice daily for 10 days for right AOE/AOM. Patient was counseled on the indications, possible side effects, and proper administration of medication. Prescription was e-submitted to the patient's preferred pharmacy.   I recommended additional course of p.o.  Augmentin (875-125 mg) tablet BID x 10 days for right AOM. Patient was counseled on the indications, possible side effects, and proper administration of medication. Prescription was e-submitted to the patient's preferred pharmacy.   I counseled patient on the following dry ear precautions: Avoid Swimming or hot tubs until ear infection is resolved. If you must swim, please use silicone ear plugs or rubber swim caps and make sure no water gets in the ears. Use cotton ball covered with Vaseline before showering. Apply Vaseline on cotton ball and place it just at the opening of ear canal to create a water seal. Do not push the cotton ball all the way in the canal. After shower, remove the cotton ball and wipe off excess Vaseline using dry clean cloth. Then, use hair dryer on warm or cool air and hold it out 12 inches away from the affected ear and air dry ears for 30 seconds.  Follow-up: Patient may schedule for follow-up in 2-3 weeks to evaluate treatment outcomes. They may follow-up sooner, if needed. Patient is agreeable to this plan, all questions were answered to patient's satisfaction.     Subjective   HPI: Fang Carvalho is a 34 y.o. female who presents for the evaluation of right ear drainage and right hearing loss. The patient states that symptom onset began 6 weeks ago. Describes ear drainage as yellow/white oozing from the right ear. Patient seen by PCP for symptoms on 9/3/24 and received p.o. Augmentin course for AOM with moderate symptom benefit. Patient received additional course of p.o. cefdinir for symptoms on 9/16/24 which provided symptom benefit for ear pain but continued to notice muffled hearing and thick yellow drainage from the right ear. Patient seen by PCP on 10/8/24 for ongoing ear symptoms and received p.o. doxycyline 100 mg BID x 10 days which patient did not take due to concerns with GI upset. States that she also use tap water to irrigate right ear canal at home. When asked about the  presence of left hearing loss, ear pain, ear fullness/pressure, tinnitus, ear itching, left ear drainage, autophony, dizziness or vertigo, she admits to left ear itching. Reports that she was prescribed DermOtic drops by her dermatologist a few months ago but has not used recently since presenting symptom onset. When asked about pertinent otologic history, the patient denies a history of recurrent ear infections, denies history of ear surgery, reports history of PE tube insertion x 1 in early childhood. Patient reports history of prolonged/traumatic loud noise exposure. The patient does not endorse a family history of hearing loss.       PATIENT HISTORY:  Past Medical History:   Diagnosis Date    ADHD (attention deficit hyperactivity disorder) 1/1/2019    Ankylosing spondylitis     Arthritis 1/1/2002    Bipolar disorder, unspecified (Multi) 01/05/2023    Bipolar 1 disorder    Eczema 3/1/2023    GERD without esophagitis     Hyperthyroidism complicating pregnancy ([facility])     Long term (current) use of immunosuppressive biologic 12/30/2015    Adalimumab (Humira) long-term use    Personal history of other diseases of the musculoskeletal system and connective tissue 01/05/2023    History of ankylosing spondylitis    Polycystic ovarian syndrome 01/05/2023    PCOS (polycystic ovarian syndrome)    Raynaud's syndrome     Raynaud's syndrome without gangrene     Raynaud's phenomenon without gangrene      Past Surgical History:   Procedure Laterality Date    FRACTURE SURGERY  4/1/2014    OTHER SURGICAL HISTORY  01/05/2023    Femur fracture repair      Allergies   Allergen Reactions    Biaxin [Clarithromycin] Psychosis        Current Outpatient Medications:     cariprazine (Vraylar) 3 mg capsule, Take 1 capsule (3 mg) by mouth once daily., Disp: , Rfl:     doxycycline (Vibramycin) 100 mg capsule, Take 1 capsule (100 mg) by mouth 2 times a day for 10 days. Take with at least 8 ounces (large glass) of water, do not lie down for  30 minutes after, Disp: 20 capsule, Rfl: 0    fluocinolone (DermOtic) 0.01 % ear drops, 1-2 drops every 12 hours if needed., Disp: , Rfl:     levonorgestrel (Mirena) 21 mcg/24 hours (8 yrs) 52 mg IUD, 52 mg by intrauterine route 1 time. IN 2/12/24 OUT 2/32, Disp: , Rfl:     Simponi 100 mg/mL syringe, Inject 1 mL (100 mg) under the skin every 30 (thirty) days., Disp: , Rfl:    Tobacco Use: Low Risk  (10/8/2024)    Patient History     Smoking Tobacco Use: Never     Smokeless Tobacco Use: Never     Passive Exposure: Not on file      Alcohol Use: Not At Risk (11/12/2023)    AUDIT-C     Frequency of Alcohol Consumption: Never     Average Number of Drinks: Patient does not drink     Frequency of Binge Drinking: Never      Social History     Substance and Sexual Activity   Drug Use Never        Review of Systems   All other systems negative.     Objective   Visit Vitals  OB Status Recent pregnancy   Smoking Status Never        PHYSICAL EXAM:  General appearance: Appears well, well-nourished, well groomed. No acute distress.   Constitutional: No fever, chills, weight loss or weight gain.  Communication: Normal communication  Psychiatric: Oriented to person, place and time. Normal mood and affect.  Neurologic: Cranial nerves II-XII grossly intact and symmetric bilaterally.  Cardiovascular: Examination of peripheral vascular system shows no clubbing or cyanosis.  Respiratory: No respiratory distress increased work of breathing. Inspection of the chest with symmetric chest expansion and normal respiratory effort.  Skin: No head and neck rashes.  Head: Normocephalic. Atraumatic with no masses, lesions or scarring.  Face: Normal symmetry. No scars or deformities.  Eyes: Conjunctiva not edematous or erythematous. PERRLA  Neck: Supple and symmetric, trachea midline. Lymph nodes with no adenopathy.  Head: Normocephalic. Atraumatic with no masses, lesions or scarring.  Eyes: PERRL, EOMI, Conjunctiva is clear. No nystagmus.  Nose:  External inspection of nose: No nasal lesions, lacerations or scars.   Throat:  Floor of mouth is clear, no masses.  Tongue appears normal, no lesions or masses. Gums, gingiva, buccal mucosa appear pink and moist, no lesions. Teeth are in intact.  No obvious dental infections.   Oropharynx: No lesions. Retropharyngeal wall is flat.  []postnasal drip.  Salivary Glands: Symmetric bilaterally.  No palpable masses.  No evidence of acute infection or salivary stones.  TMJ: Normal, no trismus.  Right Ear: External inspection of ear with no deformity, scars, or masses. Mastoid is nontender. External auditory canal is impacted with purulent/cloudy drainage and cerumen. Unable to visualize TM.   Left Ear: External inspection of ear with no deformity, scars, or masses. Mastoid is nontender. External auditory canal is partially impacted with cerumen. Unable to visualize TM.     EAR CLEANING PROCEDURE NOTE:  Indication: Cerumen/infectious debris impaction  Location: bilateral ear canals  Procedure Note: The procedure was performed by the provider.  Visualization Instrument: A microscope with a #5 speculum was placed in the ear canals to visualize the ear canal debris.  Ear Cleaning Instrument and Outcome: Culture was obtained for otorrhea of the right EAC. Using the 5/7 suction, a copious amount of cerumen and infectious debris that appeared purulent/thick  was removed from the right EAC.  Post-Procedure/Microscopic Otologic Exam:  Right Ear--TM appears erythematous/pulsating with central/superior TM perforation (30%) with purulent drainage coming from perforation.  EAC is clear and appears diffusely moist/erythematous.   Left Ear-- TM is intact with no sign of infection, effusion, or retraction.  No perforation seen. EAC is clear. Auto insufflation is visible under microscopy.  Patient Status: The patient tolerated the procedure well.  Complications: There were no complications.      RESULTS:  I personally reviewed the  patient's audiogram from 10/17/24, which revealed the following results: Right ear with mild sloping to moderate conductive hearing loss across all frequencies. Left ear with essentially normal hearing across all frequencies. Right ear with Type B tympanogram, and left ear with normal tympanogram. Right ear with 100% WRS at 70 dB, and left ear with 100% WRS at 45 dB. Acoustic reflexes absent right ipsilateral, and present left ipsilateral.    Nilda Vega, APRN-CNP

## 2024-10-17 NOTE — PATIENT INSTRUCTIONS
Dry ear Precautions: Avoid Swimming or hot tubs until ear infection is resolved. If you must swim, please use silicone ear plugs or rubber swim caps and make sure no water gets in the ears. Use cotton ball covered with Vaseline before showering. Apply Vaseline on cotton ball and place it just at the opening of ear canal to create a water seal. Do not push the cotton ball all the way in the canal. After shower, remove the cotton ball and wipe off excess Vaseline using dry clean cloth. Then, use hair dryer on warm or cool air and hold it out 12 inches away from the affected ear and air dry ears for 30 seconds.

## 2024-10-20 LAB
BACTERIA SPEC CULT: ABNORMAL
GRAM STN SPEC: ABNORMAL
GRAM STN SPEC: ABNORMAL

## 2024-10-24 ENCOUNTER — APPOINTMENT (OUTPATIENT)
Dept: PRIMARY CARE | Facility: CLINIC | Age: 34
End: 2024-10-24
Payer: COMMERCIAL

## 2024-10-24 VITALS
HEART RATE: 81 BPM | RESPIRATION RATE: 22 BRPM | OXYGEN SATURATION: 98 % | SYSTOLIC BLOOD PRESSURE: 110 MMHG | DIASTOLIC BLOOD PRESSURE: 76 MMHG | TEMPERATURE: 98.3 F | WEIGHT: 177 LBS | BODY MASS INDEX: 30.38 KG/M2

## 2024-10-24 DIAGNOSIS — Z00.00 ROUTINE HEALTH MAINTENANCE: Primary | ICD-10-CM

## 2024-10-24 PROCEDURE — 99395 PREV VISIT EST AGE 18-39: CPT | Performed by: FAMILY MEDICINE

## 2024-10-24 PROCEDURE — 1036F TOBACCO NON-USER: CPT | Performed by: FAMILY MEDICINE

## 2024-10-24 PROCEDURE — 90656 IIV3 VACC NO PRSV 0.5 ML IM: CPT | Performed by: FAMILY MEDICINE

## 2024-10-24 PROCEDURE — 90471 IMMUNIZATION ADMIN: CPT | Performed by: FAMILY MEDICINE

## 2024-10-24 SDOH — HEALTH STABILITY: PHYSICAL HEALTH: ON AVERAGE, HOW MANY MINUTES DO YOU ENGAGE IN EXERCISE AT THIS LEVEL?: 30 MIN

## 2024-10-24 SDOH — HEALTH STABILITY: PHYSICAL HEALTH: ON AVERAGE, HOW MANY DAYS PER WEEK DO YOU ENGAGE IN MODERATE TO STRENUOUS EXERCISE (LIKE A BRISK WALK)?: 1 DAY

## 2024-10-24 ASSESSMENT — ENCOUNTER SYMPTOMS
FATIGUE: 1
COUGH: 0
FEVER: 0
PALPITATIONS: 0
ABDOMINAL PAIN: 0
DIZZINESS: 0

## 2024-10-24 NOTE — PROGRESS NOTES
Subjective   Patient ID: Fang Carvalho is a 34 y.o. female who presents for Annual Exam.    HPI     Here today for annual physical  She is currently following with cardiology for recurrent episodes of low heart rate since giving birth in November.  She had an echocardiogram and a 1 week event monitor and is going to be following up with them next week.  She has fatigue but denies any dizziness, chest pain or palpitations or lightheadedness  She is following with psychiatry for bipolar disorder.  Currently taking Vraylar.  This has been well-controlled and she has not had any recent manic or depressive episodes  She follows with rheumatology for ankylosing spondylitis.  Currently on Simponi  She recently established with ENT for right ear otorrhea and was found to have a right TM perforation.  She has been using Ciprodex drops and ear symptoms have been improving  She follows with gynecology for Pap testing  Family history significant for a premelanoma in her sister.  She follows with dermatology for skin checks  Last Tdap vaccine was 2023  She usually exercises 3 times per week.  Has a balanced diet    Review of Systems   Constitutional:  Positive for fatigue. Negative for fever.   Respiratory:  Negative for cough.    Cardiovascular:  Negative for chest pain and palpitations.   Gastrointestinal:  Negative for abdominal pain.   Skin:  Negative for rash.   Neurological:  Negative for dizziness.   All other systems reviewed and are negative.      Objective   /76   Pulse 81   Temp 36.8 °C (98.3 °F) (Temporal)   Resp 22   Wt 80.3 kg (177 lb)   SpO2 98%   BMI 30.38 kg/m²     Physical Exam  Vitals reviewed.   Constitutional:       General: She is not in acute distress.     Appearance: Normal appearance. She is well-developed.   HENT:      Head: Normocephalic.      Right Ear: Ear canal and external ear normal.      Left Ear: Tympanic membrane, ear canal and external ear normal.      Ears:      Comments: Right  TM perforation     Nose: Nose normal.      Mouth/Throat:      Mouth: Mucous membranes are moist.   Eyes:      Conjunctiva/sclera: Conjunctivae normal.   Neck:      Thyroid: No thyromegaly.      Vascular: No JVD.   Cardiovascular:      Rate and Rhythm: Normal rate and regular rhythm.      Heart sounds: Normal heart sounds.   Pulmonary:      Effort: Pulmonary effort is normal.      Breath sounds: Normal breath sounds.   Abdominal:      Palpations: Abdomen is soft.      Tenderness: There is no abdominal tenderness.   Musculoskeletal:      Right lower leg: No edema.      Left lower leg: No edema.   Lymphadenopathy:      Cervical: No cervical adenopathy.   Skin:     Findings: No rash.   Neurological:      Mental Status: She is alert and oriented to person, place, and time.   Psychiatric:         Mood and Affect: Mood normal.         Behavior: Behavior normal.         Assessment/Plan   Assessment & Plan  Routine health maintenance         Continue healthy diet and regular exercise  She recently had full labs checked last month including glucose and lipid panel which were both at goal  Continue to follow with gynecology for Pap testing  Up-to-date on Tdap vaccine.  Given influenza vaccine today  Continue to follow with psychiatry for bipolar disorder, rheumatology for ankylosing spondylitis, cardiology for low heart rate, and ENT for TM perforation  Follow-up in 1 year for next CPE

## 2024-11-05 ENCOUNTER — APPOINTMENT (OUTPATIENT)
Dept: CARDIOLOGY | Facility: CLINIC | Age: 34
End: 2024-11-05
Payer: COMMERCIAL

## 2024-11-05 DIAGNOSIS — R53.83 FATIGUE, UNSPECIFIED TYPE: Primary | ICD-10-CM

## 2024-11-05 DIAGNOSIS — M45.9 ANKYLOSING SPONDYLITIS, UNSPECIFIED SITE OF SPINE (MULTI): ICD-10-CM

## 2024-11-05 PROBLEM — R00.1 BRADYCARDIA: Status: RESOLVED | Noted: 2024-09-20 | Resolved: 2024-11-05

## 2024-11-05 PROCEDURE — 99214 OFFICE O/P EST MOD 30 MIN: CPT | Performed by: NURSE PRACTITIONER

## 2024-11-05 PROCEDURE — 1036F TOBACCO NON-USER: CPT | Performed by: NURSE PRACTITIONER

## 2024-11-05 NOTE — PROGRESS NOTES
Name : Fang Carvalho   : 1990   MRN : [id number]   ENC Date : 2024    CC:   Vv- Follow up testing     HPI:    Fang Carvalho is a 34 y.o. female with PMHx sig for Ankylosing spondylitis, Raynaud, Bipolar Disorder & PCOS who presents today as above.    Previous Encounter:  Reports that after the birth of her daughter, Yolanda, in 2023 she had a low HR into the 40s & fatigue. HR recovered after 1 week.    2 weeks ago started to feel really tired, started wearing apple watch, noticing HR 40s while sitting & working. Reports increase in fatigue. Increases with activity.     Reports that she has had an ear infection for the past 2 weeks for which she is on antibiotics.    Activity consists of chasing her daughter around which does not cause any SOB or CP.    ER 24 for c/o chest pain, elevated D-dimer but no PE & cardiac workup was unrevealing    4 cups of coffee, 2 diet pepsi & 30oz bottle of water a day    CV Diagnostics:  EKG today shows Sinus Rhythm, HR 80 bpm    ROS: unless otherwise noted in the history of present illness, all other systems were reviewed and they are negative for complaints     PMH: as above    PSH:  She has a past surgical history that includes Other surgical history (2023) and Fracture surgery (2014).    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    FHx:  Family History   Problem Relation Name Age of Onset    Skin cancer Mother Neil Strange     Hypertension Mother Neil Strange     Ankylosing spondylitis Father Neil Strange     Hypertension Father Neil Strange     Skin cancer Father Neil Strange     Arthritis Father Neil Strange     Cancer Father Neil Strange     Skin cancer Sister        Allergies:  Biaxin [clarithromycin]    Outpatient Medications:  Current Outpatient Medications   Medication Instructions    cariprazine (VRAYLAR) 3 mg, Daily    levonorgestrel (Mirena)  21 mcg/24 hours (8 yrs) 52 mg IUD 1 each, Once    Simponi 100 mg, Every 30 days     Last Recorded Vitals:  There were no vitals filed for this visit.    Physical Exam:  A+Ox3, NAD     Last Labs:  CBC -  Lab Results   Component Value Date    WBC 9.0 09/10/2024    HGB 13.7 09/10/2024    HCT 42.0 09/10/2024    MCV 85 09/10/2024     09/10/2024       CMP -  Lab Results   Component Value Date    CALCIUM 9.5 09/10/2024    PROT 7.0 09/10/2024    ALBUMIN 4.5 09/10/2024    AST 16 09/10/2024    ALT 13 09/10/2024    ALKPHOS 58 09/10/2024    BILITOT 0.3 09/10/2024       LIPID PANEL -   Lab Results   Component Value Date    CHOL 111 09/10/2024    TRIG 72 09/10/2024    HDL 44.2 09/10/2024    CHHDL 2.5 09/10/2024    VLDL 14 09/10/2024    NHDL 67 09/10/2024       RENAL FUNCTION PANEL -   Lab Results   Component Value Date    GLUCOSE 88 09/10/2024     09/10/2024    K 4.7 09/10/2024     09/10/2024    CO2 28 09/10/2024    ANIONGAP 11 09/10/2024    BUN 11 09/10/2024    CREATININE 0.75 09/10/2024    CALCIUM 9.5 09/10/2024    ALBUMIN 4.5 09/10/2024        Lab Results   Component Value Date    BNP 9 01/27/2024    HGBA1C 5.0 01/09/2023    HGBA1C 5.1 10/04/2021     I have reviewed the above labs & diagnostics    Assessment/Plan:  Inappropriate bradycardia.  Fatigue  Ankylosing spondylitis     Echo 9/26/24: EF 59%, RVSP 22 mmHg    Event monitor 10/7/24 - 10/14/24: HR  bpm, Avg HR 70. No atrial ectopy was recorded. Rare PVCs were recorded. There is no evidence of VT. There is no evidence of high-grade AV block or sinus pauses. A total of 3 patient triggers were recorded of which 2 correlated with sinus tachycardia, and 1 correlated with artifact.     Diagnostics do not support cardiac etiology of her fatigue. Also, does not appear to have chronotropic incompetence. Results discussed. All questions answered. No further cardiac workup at this time. Advise follow up with PCP    Tracy M Schwab, APRN-CNP

## 2024-11-11 ENCOUNTER — APPOINTMENT (OUTPATIENT)
Dept: OTOLARYNGOLOGY | Facility: CLINIC | Age: 34
End: 2024-11-11
Payer: COMMERCIAL

## 2024-11-11 VITALS
WEIGHT: 175 LBS | DIASTOLIC BLOOD PRESSURE: 74 MMHG | BODY MASS INDEX: 29.88 KG/M2 | HEART RATE: 70 BPM | HEIGHT: 64 IN | SYSTOLIC BLOOD PRESSURE: 116 MMHG

## 2024-11-11 DIAGNOSIS — H72.91 PERFORATION OF RIGHT TYMPANIC MEMBRANE: Primary | ICD-10-CM

## 2024-11-11 PROCEDURE — 92504 EAR MICROSCOPY EXAMINATION: CPT

## 2024-11-11 PROCEDURE — 3008F BODY MASS INDEX DOCD: CPT

## 2024-11-11 PROCEDURE — 1036F TOBACCO NON-USER: CPT

## 2024-11-11 PROCEDURE — 99213 OFFICE O/P EST LOW 20 MIN: CPT

## 2024-11-25 ENCOUNTER — HOSPITAL ENCOUNTER (OUTPATIENT)
Dept: RADIOLOGY | Facility: HOSPITAL | Age: 34
Discharge: HOME | End: 2024-11-25
Payer: COMMERCIAL

## 2024-11-25 ENCOUNTER — OFFICE VISIT (OUTPATIENT)
Dept: PRIMARY CARE | Facility: CLINIC | Age: 34
End: 2024-11-25
Payer: COMMERCIAL

## 2024-11-25 ENCOUNTER — LAB (OUTPATIENT)
Dept: LAB | Facility: LAB | Age: 34
End: 2024-11-25
Payer: COMMERCIAL

## 2024-11-25 ENCOUNTER — APPOINTMENT (OUTPATIENT)
Dept: RHEUMATOLOGY | Facility: CLINIC | Age: 34
End: 2024-11-25
Payer: COMMERCIAL

## 2024-11-25 VITALS
HEART RATE: 97 BPM | SYSTOLIC BLOOD PRESSURE: 124 MMHG | DIASTOLIC BLOOD PRESSURE: 72 MMHG | TEMPERATURE: 98.7 F | OXYGEN SATURATION: 99 %

## 2024-11-25 VITALS
BODY MASS INDEX: 30.22 KG/M2 | WEIGHT: 177 LBS | DIASTOLIC BLOOD PRESSURE: 82 MMHG | HEIGHT: 64 IN | SYSTOLIC BLOOD PRESSURE: 134 MMHG | TEMPERATURE: 98.2 F | HEART RATE: 94 BPM

## 2024-11-25 DIAGNOSIS — M17.10 ARTHRITIS OF KNEE: ICD-10-CM

## 2024-11-25 DIAGNOSIS — M17.10 ARTHRITIS OF KNEE: Primary | ICD-10-CM

## 2024-11-25 DIAGNOSIS — J01.90 ACUTE SINUSITIS, RECURRENCE NOT SPECIFIED, UNSPECIFIED LOCATION: Primary | ICD-10-CM

## 2024-11-25 LAB
ALBUMIN SERPL BCP-MCNC: 4.8 G/DL (ref 3.4–5)
ALP SERPL-CCNC: 61 U/L (ref 33–110)
ALT SERPL W P-5'-P-CCNC: 34 U/L (ref 7–45)
ANION GAP SERPL CALC-SCNC: 17 MMOL/L (ref 10–20)
AST SERPL W P-5'-P-CCNC: 26 U/L (ref 9–39)
BASOPHILS # BLD AUTO: 0.04 X10*3/UL (ref 0–0.1)
BASOPHILS NFR BLD AUTO: 0.4 %
BILIRUB SERPL-MCNC: 0.6 MG/DL (ref 0–1.2)
BUN SERPL-MCNC: 12 MG/DL (ref 6–23)
CALCIUM SERPL-MCNC: 10.2 MG/DL (ref 8.6–10.6)
CCP IGG SERPL-ACNC: <1 U/ML
CENTROMERE B AB SER-ACNC: <0.2 AI
CHLORIDE SERPL-SCNC: 101 MMOL/L (ref 98–107)
CHROMATIN AB SERPL-ACNC: <0.2 AI
CO2 SERPL-SCNC: 26 MMOL/L (ref 21–32)
CREAT SERPL-MCNC: 0.74 MG/DL (ref 0.5–1.05)
CRP SERPL-MCNC: 9.7 MG/DL
DSDNA AB SER-ACNC: 1 IU/ML
EGFRCR SERPLBLD CKD-EPI 2021: >90 ML/MIN/1.73M*2
ENA JO1 AB SER QL IA: <0.2 AI
ENA RNP AB SER IA-ACNC: <0.2 AI
ENA SCL70 AB SER QL IA: <0.2 AI
ENA SM AB SER IA-ACNC: <0.2 AI
ENA SM+RNP AB SER QL IA: <0.2 AI
ENA SS-A AB SER IA-ACNC: <0.2 AI
ENA SS-B AB SER IA-ACNC: <0.2 AI
EOSINOPHIL # BLD AUTO: 0.19 X10*3/UL (ref 0–0.7)
EOSINOPHIL NFR BLD AUTO: 1.8 %
ERYTHROCYTE [DISTWIDTH] IN BLOOD BY AUTOMATED COUNT: 12.7 % (ref 11.5–14.5)
GLUCOSE SERPL-MCNC: 83 MG/DL (ref 74–99)
HCT VFR BLD AUTO: 43.7 % (ref 36–46)
HGB BLD-MCNC: 14.2 G/DL (ref 12–16)
IMM GRANULOCYTES # BLD AUTO: 0.03 X10*3/UL (ref 0–0.7)
IMM GRANULOCYTES NFR BLD AUTO: 0.3 % (ref 0–0.9)
LYMPHOCYTES # BLD AUTO: 3 X10*3/UL (ref 1.2–4.8)
LYMPHOCYTES NFR BLD AUTO: 28.7 %
MCH RBC QN AUTO: 27.8 PG (ref 26–34)
MCHC RBC AUTO-ENTMCNC: 32.5 G/DL (ref 32–36)
MCV RBC AUTO: 86 FL (ref 80–100)
MONOCYTES # BLD AUTO: 0.63 X10*3/UL (ref 0.1–1)
MONOCYTES NFR BLD AUTO: 6 %
NEUTROPHILS # BLD AUTO: 6.57 X10*3/UL (ref 1.2–7.7)
NEUTROPHILS NFR BLD AUTO: 62.8 %
NRBC BLD-RTO: 0 /100 WBCS (ref 0–0)
PLATELET # BLD AUTO: 292 X10*3/UL (ref 150–450)
POTASSIUM SERPL-SCNC: 4.4 MMOL/L (ref 3.5–5.3)
PROT SERPL-MCNC: 7.7 G/DL (ref 6.4–8.2)
RBC # BLD AUTO: 5.1 X10*6/UL (ref 4–5.2)
RHEUMATOID FACT SER NEPH-ACNC: 11 IU/ML (ref 0–15)
RIBOSOMAL P AB SER-ACNC: <0.2 AI
SODIUM SERPL-SCNC: 140 MMOL/L (ref 136–145)
WBC # BLD AUTO: 10.5 X10*3/UL (ref 4.4–11.3)

## 2024-11-25 PROCEDURE — 1036F TOBACCO NON-USER: CPT | Performed by: FAMILY MEDICINE

## 2024-11-25 PROCEDURE — 3008F BODY MASS INDEX DOCD: CPT | Performed by: INTERNAL MEDICINE

## 2024-11-25 PROCEDURE — 72190 X-RAY EXAM OF PELVIS: CPT

## 2024-11-25 PROCEDURE — 99213 OFFICE O/P EST LOW 20 MIN: CPT | Performed by: FAMILY MEDICINE

## 2024-11-25 PROCEDURE — 72190 X-RAY EXAM OF PELVIS: CPT | Performed by: RADIOLOGY

## 2024-11-25 PROCEDURE — 86200 CCP ANTIBODY: CPT

## 2024-11-25 PROCEDURE — 86235 NUCLEAR ANTIGEN ANTIBODY: CPT

## 2024-11-25 PROCEDURE — 86140 C-REACTIVE PROTEIN: CPT

## 2024-11-25 PROCEDURE — 1036F TOBACCO NON-USER: CPT | Performed by: INTERNAL MEDICINE

## 2024-11-25 PROCEDURE — 36415 COLL VENOUS BLD VENIPUNCTURE: CPT

## 2024-11-25 PROCEDURE — 86431 RHEUMATOID FACTOR QUANT: CPT

## 2024-11-25 PROCEDURE — 80053 COMPREHEN METABOLIC PANEL: CPT

## 2024-11-25 PROCEDURE — 99204 OFFICE O/P NEW MOD 45 MIN: CPT | Performed by: INTERNAL MEDICINE

## 2024-11-25 PROCEDURE — 85025 COMPLETE CBC W/AUTO DIFF WBC: CPT

## 2024-11-25 PROCEDURE — 86038 ANTINUCLEAR ANTIBODIES: CPT

## 2024-11-25 PROCEDURE — 86225 DNA ANTIBODY NATIVE: CPT

## 2024-11-25 RX ORDER — IBUPROFEN 200 MG
200 TABLET ORAL EVERY 6 HOURS PRN
COMMUNITY

## 2024-11-25 RX ORDER — PSEUDOEPHEDRINE HCL 30 MG
30 TABLET ORAL EVERY 4 HOURS PRN
COMMUNITY

## 2024-11-25 RX ORDER — AMOXICILLIN AND CLAVULANATE POTASSIUM 875; 125 MG/1; MG/1
1 TABLET, FILM COATED ORAL 2 TIMES DAILY
Qty: 14 TABLET | Refills: 0 | Status: SHIPPED | OUTPATIENT
Start: 2024-11-25 | End: 2024-12-02

## 2024-11-25 ASSESSMENT — ENCOUNTER SYMPTOMS
COUGH: 1
FEVER: 0
SINUS PRESSURE: 1
HEADACHES: 1

## 2024-11-25 NOTE — PROGRESS NOTES
Subjective   Patient ID: Fang Carvalho is a 34 y.o. female who presents for Eye Drainage and URI.    URI   This is a new problem. The current episode started in the past 7 days. Associated symptoms include congestion, coughing and headaches. Pertinent negatives include no ear pain. Associated symptoms comments: Eye drainage, eye spot. . Treatments tried: Sudafed and ibuprofen. The treatment provided mild relief.     She has had URI symptoms present for 5 days.  She initially developed a headache and redness in her left eye.  This morning both eyes were crusted with green discharge.  Eyes have been slightly itchy  She has also had a lot of sinus pressure and green nasal drainage.  No fever.  Treating symptoms with Sudafed and saline spray            Review of Systems   Constitutional:  Negative for fever.   HENT:  Positive for congestion and sinus pressure. Negative for ear pain.    Respiratory:  Positive for cough.    Neurological:  Positive for headaches.       Objective   /72   Pulse 97   Temp 37.1 °C (98.7 °F) (Temporal)   SpO2 99%     Physical Exam  Vitals reviewed.   Constitutional:       General: She is not in acute distress.     Appearance: Normal appearance.   HENT:      Head: Normocephalic.      Right Ear: Ear canal and external ear normal.      Left Ear: Tympanic membrane, ear canal and external ear normal.      Ears:      Comments: Right TM perforation (follows with ENT for this)     Nose: Congestion present.      Comments: Bilateral maxillary sinus tenderness to percussion     Mouth/Throat:      Mouth: Mucous membranes are moist.      Pharynx: No oropharyngeal exudate or posterior oropharyngeal erythema.   Eyes:      Conjunctiva/sclera: Conjunctivae normal.      Comments: Mild bilateral conjunctival injection   Cardiovascular:      Rate and Rhythm: Normal rate and regular rhythm.      Heart sounds: Normal heart sounds.   Pulmonary:      Effort: Pulmonary effort is normal.      Breath sounds:  Normal breath sounds.   Lymphadenopathy:      Cervical: No cervical adenopathy.   Skin:     Findings: No rash.   Neurological:      Mental Status: She is alert.   Psychiatric:         Mood and Affect: Mood normal.         Behavior: Behavior normal.         Assessment/Plan   Assessment & Plan  Acute sinusitis, recurrence not specified, unspecified location    Orders:    amoxicillin-pot clavulanate (Augmentin) 875-125 mg tablet; Take 1 tablet by mouth 2 times a day for 7 days.    She is currently on day 5 of symptoms.  We discussed that at this point her symptoms may still be viral, however if they do not improve over the next several days, an antibiotic would be indicated.  I did send in a prescription for Augmentin and recommended that she take if the symptoms are not getting any better in the next few days.  Also recommended starting Flonase for nasal congestion and sinus pressure.  Follow-up or call in 1 week if symptoms are not resolving

## 2024-11-25 NOTE — PROGRESS NOTES
Chart reviewed   Hx of RENE followed by Dr. Edmonds years ago and more recently dx and tx for AS.   She was on MTX, humira and then Orencia in the past  She is currently on Simponi for the last 4 years and doing well for AS (+IBP HLA B27+)  Interestingly she has had some skin manifestations angular chelitis, dermatitis / psoriasform post ear area  She currently works as a   Hx of bipolar   34 y.o.        female          CHIEF COMPLAINT: Management of arthritis    HPI: At today's visit, the patient reports bilateral knee pain. She has pain lasting all day. Has no morning stiffness or back pain or heel pain.   She has been using Golimumab 100 mg.   NO confirmed H/O psoriasis.     Patient Active Problem List   Diagnosis    Ankylosing spondylitis    Anxiety disorder    Bipolar II disorder, severe, depressed, with mixed features, in full remission (Multi)    Gastroesophageal reflux disease without esophagitis    HLA B27 positive    High risk pregnancy, antepartum (HHS-HCC)    PCOS (polycystic ovarian syndrome)    Polyarticular juvenile idiopathic arthritis (Multi)    Vitamin D deficiency, unspecified    Rubella non-immune status, antepartum (HHS-HCC)    Chronic low back pain    Routine postpartum follow-up    Encounter for counseling regarding contraception    IUD (intrauterine device) in place    Fatigue         Past Medical History:   Diagnosis Date    ADHD (attention deficit hyperactivity disorder) 1/1/2019    Ankylosing spondylitis     Arthritis 1/1/2002    Bipolar disorder, unspecified (Multi) 01/05/2023    Bipolar 1 disorder    Eczema 3/1/2023    GERD without esophagitis     Hyperthyroidism complicating pregnancy (HHS-HCC)     Long term (current) use of immunosuppressive biologic 12/30/2015    Adalimumab (Humira) long-term use    Personal history of other diseases of the musculoskeletal system and connective tissue 01/05/2023    History of ankylosing spondylitis    Polycystic ovarian syndrome  01/05/2023    PCOS (polycystic ovarian syndrome)    Raynaud's syndrome     Raynaud's syndrome without gangrene     Raynaud's phenomenon without gangrene            Past Surgical History:   Procedure Laterality Date    FRACTURE SURGERY  4/1/2014    OTHER SURGICAL HISTORY  01/05/2023    Femur fracture repair       Allergies   Allergen Reactions    Biaxin [Clarithromycin] Psychosis       Medication Documentation Review Audit       Reviewed by Celine Bedoya RN (Registered Nurse) on 11/25/24 at 0842      Medication Order Taking? Sig Documenting Provider Last Dose Status   cariprazine (Vraylar) 3 mg capsule [id number] Yes Take 1 capsule (3 mg) by mouth once daily. Historical Provider, MD  Active   ibuprofen 200 mg tablet [id number] Yes Take 1 tablet (200 mg) by mouth every 6 hours if needed for mild pain (1 - 3). Historical Provider, MD  Active   levonorgestrel (Mirena) 21 mcg/24 hours (8 yrs) 52 mg IUD [id number] Yes 52 mg by intrauterine route 1 time. IN 2/12/24  OUT 2/32 Historical Provider, MD  Active   pseudoephedrine (Sudafed) 30 mg tablet [id number] Yes Take 1 tablet (30 mg) by mouth every 4 hours if needed for congestion. Historical Provider, MD  Active   Simponi 100 mg/mL syringe [id number] Yes Inject 1 mL (100 mg) under the skin every 30 (thirty) days. Historical Provider, MD  Active                        Family History   Problem Relation Name Age of Onset    Skin cancer Mother Neil Strange     Hypertension Mother Neil Strange     Ankylosing spondylitis Father Neil Strange     Hypertension Father Neil Strange     Skin cancer Father Neil Strange     Arthritis Father Neil Strange     Cancer Father Neil Strange     Skin cancer Sister            Social History     Tobacco Use    Smoking status: Never     Passive exposure: Never    Smokeless tobacco: Never   Vaping Use    Vaping status: Never Used   Substance Use Topics    Alcohol use: Yes     Alcohol/week: 1.0 standard drink of alcohol     Types: 1 Glasses of wine per week     Comment:  1x week    Drug use: Never         Review of Systems    REVIEW OF SYSTEMS:  Constitutional: No fatigue  Skin:  No rash or psoriasis or photosensitvity  Head: No headache, oral ulcers or hair loss  Neck: No difficulty swallowing or choking  Eyes: No dry eyes or iritis  Mouth: No dry mouth  or oral ulcers  Pulmonary: No wheezing, pleurisy or SOB  Cardiovascular: No chest pain or palpitations  Gastrointestinal: Heartburn with NSAIds  Endocrine: No Raynaud's   Musculoskeletal: As H/P    All 10 review of systems negative      PHYSICAL EXAM:  Visit Vitals  /82   Pulse 94   Temp 36.8 °C (98.2 °F)     General - NAD, sitting up in chair, well-groomed, pleasant, AAOx3  Head: Normocephalic, atraumatic  Eyes - PERRLA, EOMI. No conjunctiva injection.   Mouth/ENT -  No facial rash. Skin - No rashes or ulcers.   Skin warm and dry. No erythema on bilateral cheeks.  Extremities - No edema, cyanosis ,or clubbing  Neurological - Alert and oriented x 3,  grossly intact. No focal deficit.  Musculoskeletal -  EXAMJOINTDETAILED,  No joint swelling or tenderness  Sacroiliac joints: No local tenderness. JEFFERSON negative.   Hips: Full ROM.  No malalignment.  Knees:  Full ROM, without pain, no swelling, warmth or point tenderness. No joint line tenderness, no pes anserine tenderness.  Ankles: Full ROM, without pain, swelling, warmth or tenderness.  Back: No restriction of movement     DATE OF EXAM: Jan 16 2024  1:10PM      [facility]   [id number]  -  XR KNEE SURVEY 1V  AP ARABELLA  / ACCESSION #  [id number]     PROCEDURE REASON: Ankylosing spondylitis of sacral region (HCC)          * * * * Physician Interpretation * * * *      EXAMINATION:  XR KNEE SURVEY 1V  AP ARABELLA     HISTORY:   Akylosing spondylitis, patient states having bilateral knee   pain since late November, pain in left knee worse than pain in right knee    Ankylosing spondylitis of sacral region (HCC)   .     TECHNIQUE:  XR KNEE SURVEY 1V  AP ARABELLA      Laterality:  NOT APPLICABLE      Number of  different views (projections): 1      M:  XB_1     COMPARISON:     RESULT:     Mild narrowing of the medial compartments bilaterally and minimal   narrowing of the lateral compartments. Small marginal osteophytes   medially bilaterally. Partially seen right femoral intramedullary dinesh.          Assessment/Plan: 34 yr old with H/O RENE and then diagnosis switched to ankylosing spondylitis. based on some IBP and positive HLAB-27.  No imaging available to confirm sacroiliitis. Will order Xray of the pelvis.   Her CRP was elevated to 3 mg/dl in 6/24 suggestive of persistent inflammation.  Xray of knees showing some damage.   Will order blood tests. If persistent elevated CRP will switch treatment once diagnosis of AS is confirmed.   I reviewed her old chart from [facility].       Renee Brenner MD

## 2024-11-26 ENCOUNTER — TELEPHONE (OUTPATIENT)
Dept: RHEUMATOLOGY | Facility: CLINIC | Age: 34
End: 2024-11-26
Payer: COMMERCIAL

## 2024-11-26 DIAGNOSIS — M17.10 ARTHRITIS OF KNEE: ICD-10-CM

## 2024-11-26 LAB
ANA PATTERN: ABNORMAL
ANA SER QL HEP2 SUBST: POSITIVE
ANA TITR SER IF: ABNORMAL {TITER}

## 2024-11-27 ENCOUNTER — LAB (OUTPATIENT)
Dept: LAB | Facility: LAB | Age: 34
End: 2024-11-27
Payer: COMMERCIAL

## 2024-11-27 DIAGNOSIS — M17.10 ARTHRITIS OF KNEE: ICD-10-CM

## 2024-11-27 LAB — ERYTHROCYTE [SEDIMENTATION RATE] IN BLOOD BY WESTERGREN METHOD: 24 MM/H (ref 0–20)

## 2024-11-27 PROCEDURE — 85652 RBC SED RATE AUTOMATED: CPT

## 2024-11-27 PROCEDURE — 36415 COLL VENOUS BLD VENIPUNCTURE: CPT

## 2024-12-20 ENCOUNTER — OFFICE VISIT (OUTPATIENT)
Dept: PRIMARY CARE | Facility: CLINIC | Age: 34
End: 2024-12-20
Payer: COMMERCIAL

## 2024-12-20 VITALS
TEMPERATURE: 98.3 F | HEART RATE: 86 BPM | DIASTOLIC BLOOD PRESSURE: 84 MMHG | SYSTOLIC BLOOD PRESSURE: 119 MMHG | OXYGEN SATURATION: 100 %

## 2024-12-20 DIAGNOSIS — J02.9 ACUTE PHARYNGITIS, UNSPECIFIED ETIOLOGY: Primary | ICD-10-CM

## 2024-12-20 LAB
POC RAPID INFLUENZA A: NEGATIVE
POC RAPID INFLUENZA B: NEGATIVE
POC RAPID STREP: NEGATIVE
POC SARS-COV-2 AG BINAX: NORMAL

## 2024-12-20 PROCEDURE — 87804 INFLUENZA ASSAY W/OPTIC: CPT | Performed by: FAMILY MEDICINE

## 2024-12-20 PROCEDURE — 87811 SARS-COV-2 COVID19 W/OPTIC: CPT | Performed by: FAMILY MEDICINE

## 2024-12-20 PROCEDURE — 99213 OFFICE O/P EST LOW 20 MIN: CPT | Performed by: FAMILY MEDICINE

## 2024-12-20 PROCEDURE — 87880 STREP A ASSAY W/OPTIC: CPT | Performed by: FAMILY MEDICINE

## 2024-12-20 PROCEDURE — 1036F TOBACCO NON-USER: CPT | Performed by: FAMILY MEDICINE

## 2024-12-20 RX ORDER — CEFDINIR 250 MG/5ML
300 POWDER, FOR SUSPENSION ORAL 2 TIMES DAILY
Qty: 84 ML | Refills: 0 | Status: SHIPPED | OUTPATIENT
Start: 2024-12-20 | End: 2024-12-27

## 2024-12-20 RX ORDER — METHYLPREDNISOLONE 4 MG/1
TABLET ORAL
Qty: 21 TABLET | Refills: 0 | Status: SHIPPED | OUTPATIENT
Start: 2024-12-20

## 2024-12-20 ASSESSMENT — ENCOUNTER SYMPTOMS
TROUBLE SWALLOWING: 1
FEVER: 1
DIARRHEA: 0
HEADACHES: 0
COUGH: 0
SHORTNESS OF BREATH: 0
SORE THROAT: 1
HOARSE VOICE: 1
STRIDOR: 0
SWOLLEN GLANDS: 1
NECK PAIN: 1
VOMITING: 1
ABDOMINAL PAIN: 0

## 2024-12-20 NOTE — PROGRESS NOTES
Subjective   Patient ID: Fang Carvalho is a 34 y.o. female who presents for Sore Throat.    Sore Throat   This is a new problem. The current episode started in the past 7 days. The problem has been rapidly worsening. The pain is worse on the right side. There has been no fever. The fever has been present for 3 to 4 days. The pain is at a severity of 9/10. The pain is severe. Associated symptoms include congestion, a hoarse voice, neck pain, swollen glands, trouble swallowing and vomiting. Pertinent negatives include no abdominal pain, coughing, diarrhea, drooling, ear discharge, ear pain, headaches, plugged ear sensation, shortness of breath or stridor.        She has had a sore throat present for 5 days.  Sore throat has been getting worse.  Last night she had a fever.  Has also had a runny nose.  No cough, headache or myalgias.  No rash      Review of Systems   Constitutional:  Positive for fever.   HENT:  Positive for congestion, hoarse voice, sore throat and trouble swallowing. Negative for drooling, ear discharge and ear pain.    Respiratory:  Negative for cough, shortness of breath and stridor.    Gastrointestinal:  Positive for vomiting. Negative for abdominal pain and diarrhea.   Musculoskeletal:  Positive for neck pain.   Neurological:  Negative for headaches.       Objective   /84   Pulse 86   Temp 36.8 °C (98.3 °F)   SpO2 100%     Physical Exam  Vitals reviewed.   Constitutional:       General: She is not in acute distress.     Appearance: Normal appearance.   HENT:      Head: Normocephalic.      Right Ear: Tympanic membrane, ear canal and external ear normal.      Left Ear: Tympanic membrane, ear canal and external ear normal.      Nose: Nose normal.      Mouth/Throat:      Mouth: Mucous membranes are moist.      Pharynx: Posterior oropharyngeal erythema present. No oropharyngeal exudate.      Comments: Posterior pharyngeal erythema  Eyes:      Conjunctiva/sclera: Conjunctivae normal.   Neck:       Comments:  bilateral anterior cervical lymphadenopathy, right greater than left  Cardiovascular:      Rate and Rhythm: Normal rate and regular rhythm.      Heart sounds: Normal heart sounds.   Pulmonary:      Effort: Pulmonary effort is normal.      Breath sounds: Normal breath sounds.   Lymphadenopathy:      Cervical: Cervical adenopathy present.   Skin:     Findings: No rash.   Neurological:      Mental Status: She is alert.   Psychiatric:         Mood and Affect: Mood normal.         Behavior: Behavior normal.         Assessment/Plan   Assessment & Plan  Acute pharyngitis, unspecified etiology    Orders:    cefdinir (Omnicef) 250 mg/5 mL suspension; Take 6 mL (300 mg) by mouth 2 times a day for 7 days.    methylPREDNISolone (Medrol Dospak) 4 mg tablets; Follow schedule on package instructions    POCT rapid strep A manually resulted    POCT Influenza A/B manually resulted    POCT BinaxNOW Covid-19 Ag Card manually resulted    She presents today with a sore throat which has been worsening at 5 days.  Testing today for strep, COVID and flu are all negative  This is still in the range where it may be viral, however since it is getting worse at 5 days, we are going into a weekend, and she does currently take Simponi which can increase risk of bacterial infection, we will start her on an antibiotic.  We will treat with cefdinir twice daily x 7 days (she would prefer liquid) as well as a Medrol Dosepak.  Recommended follow-up if symptoms or not improving within a few days of starting the antibiotic.  We discussed indications for ED including any high fever, increasing throat pain, trouble swallowing, trouble breathing, or other new or worsening symptoms.  Follow-up if symptoms do not improve

## 2025-01-03 ENCOUNTER — TELEPHONE (OUTPATIENT)
Dept: PRIMARY CARE | Facility: CLINIC | Age: 35
End: 2025-01-03
Payer: COMMERCIAL

## 2025-01-03 DIAGNOSIS — J02.9 ACUTE PHARYNGITIS, UNSPECIFIED ETIOLOGY: ICD-10-CM

## 2025-01-03 RX ORDER — METHYLPREDNISOLONE 4 MG/1
TABLET ORAL
Qty: 21 TABLET | Refills: 0 | Status: SHIPPED | OUTPATIENT
Start: 2025-01-03

## 2025-01-03 RX ORDER — AMOXICILLIN AND CLAVULANATE POTASSIUM 875; 125 MG/1; MG/1
1 TABLET, FILM COATED ORAL 2 TIMES DAILY
Qty: 14 TABLET | Refills: 0 | Status: SHIPPED | OUTPATIENT
Start: 2025-01-03 | End: 2025-01-10

## 2025-01-03 NOTE — TELEPHONE ENCOUNTER
Patient thinks her throat infection is coming back. She is wondering what her next steps should be    Please Advise    [phone]

## 2025-01-03 NOTE — TELEPHONE ENCOUNTER
I spoke with her  She was seen on 12/20 with a 5-day history of worsening sore throat.  At that time we treated her with cefdinir and a Medrol Dosepak  Both of these medications helped and her symptoms had almost fully resolved after taking both medications  However, now since yesterday she feels like her sore throat is coming back.  She has slight trouble swallowing but states that this is not severe.  She states that her current symptoms are not nearly as bad as they were when we saw her on the 20th.  No fever.  I am not in my regular office today, and cannot see her for an appointment.  She did feel that both medications have helped, so I will send in a refill on the Medrol pack as well as Augmentin.  She follows with ENT and I did recommend that she contact them to see if they could see her next week for an appointment.  If they cannot see her, then I would want to see her for a follow-up.  Discussed that if she does develop a high fever, severe sore throat or any significant trouble swallowing, that she should go to the ED or an urgent care over the weekend

## 2025-01-06 ENCOUNTER — APPOINTMENT (OUTPATIENT)
Dept: RHEUMATOLOGY | Facility: CLINIC | Age: 35
End: 2025-01-06
Payer: COMMERCIAL

## 2025-01-06 VITALS
WEIGHT: 177.8 LBS | SYSTOLIC BLOOD PRESSURE: 126 MMHG | BODY MASS INDEX: 30.35 KG/M2 | DIASTOLIC BLOOD PRESSURE: 78 MMHG | HEART RATE: 94 BPM | HEIGHT: 64 IN

## 2025-01-06 DIAGNOSIS — Z15.89 HLA B27 (HLA B27 POSITIVE): ICD-10-CM

## 2025-01-06 DIAGNOSIS — M70.52 PES ANSERINUS BURSITIS OF BOTH KNEES: Primary | ICD-10-CM

## 2025-01-06 DIAGNOSIS — M70.51 PES ANSERINUS BURSITIS OF BOTH KNEES: Primary | ICD-10-CM

## 2025-01-06 DIAGNOSIS — M35.00 SICCA SYNDROME (MULTI): ICD-10-CM

## 2025-01-06 PROCEDURE — 1036F TOBACCO NON-USER: CPT | Performed by: INTERNAL MEDICINE

## 2025-01-06 PROCEDURE — 99214 OFFICE O/P EST MOD 30 MIN: CPT | Performed by: INTERNAL MEDICINE

## 2025-01-06 PROCEDURE — 3008F BODY MASS INDEX DOCD: CPT | Performed by: INTERNAL MEDICINE

## 2025-01-06 RX ORDER — DEXTROAMPHETAMINE SACCHARATE, AMPHETAMINE ASPARTATE MONOHYDRATE, DEXTROAMPHETAMINE SULFATE AND AMPHETAMINE SULFATE 2.5; 2.5; 2.5; 2.5 MG/1; MG/1; MG/1; MG/1
10 CAPSULE, EXTENDED RELEASE ORAL EVERY MORNING
COMMUNITY

## 2025-01-06 ASSESSMENT — PAIN SCALES - GENERAL: PAINLEVEL_OUTOF10: 2

## 2025-01-06 NOTE — PROGRESS NOTES
Chart reviewed   Hx of RENE followed by Dr. Edmonds years ago and more recently dx and tx for AS.   She was on MTX, humira and then Orencia in the past  She is currently on Simponi for the last 4 years and doing well for AS (+IBP HLA B27+)  Interestingly she has had some skin manifestations angular chelitis, dermatitis / psoriasform post ear area  She currently works as a   Hx of bipolar                   CHIEF COMPLAINT: Follow up of RENE    HPI: At today's visit, the patient reports reports feeling. She has no morning stiffness. Her main problem is knee pain when she bends her knees like sitting in a chair. Her last blood work revealed very high CRP but she was diagnosed with sinusitis the same day which could have caused it to be elevated.       Patient Active Problem List   Diagnosis    Ankylosing spondylitis    Anxiety disorder    Bipolar II disorder, severe, depressed, with mixed features, in full remission (Multi)    Gastroesophageal reflux disease without esophagitis    HLA B27 positive    High risk pregnancy, antepartum (HHS-HCC)    PCOS (polycystic ovarian syndrome)    Polyarticular juvenile idiopathic arthritis (Multi)    Vitamin D deficiency, unspecified    Rubella non-immune status, antepartum (HHS-HCC)    Chronic low back pain    Routine postpartum follow-up    Encounter for counseling regarding contraception    IUD (intrauterine device) in place    Fatigue         Past Medical History:   Diagnosis Date    ADHD (attention deficit hyperactivity disorder) 1/1/2019    Ankylosing spondylitis     Ankylosing spondylitis of site in spine (Multi)     Arthritis 1/1/2002    Bipolar disorder, unspecified (Multi) 01/05/2023    Bipolar 1 disorder    Eczema 3/1/2023    GERD without esophagitis     Hyperthyroidism complicating pregnancy ([facility])     Long term (current) use of immunosuppressive biologic 12/30/2015    Adalimumab (Humira) long-term use    Osteoarthritis     Personal history of other diseases of the  musculoskeletal system and connective tissue 01/05/2023    History of ankylosing spondylitis    Polycystic ovarian syndrome 01/05/2023    PCOS (polycystic ovarian syndrome)    Raynaud's syndrome     Raynaud's syndrome without gangrene     Raynaud's phenomenon without gangrene    Rheumatoid arthritis 1/1/2001    Tendinitis             Past Surgical History:   Procedure Laterality Date    FEMUR FRACTURE SURGERY      FRACTURE SURGERY  4/1/2014    OTHER SURGICAL HISTORY  01/05/2023    Femur fracture repair       Allergies   Allergen Reactions    Biaxin [Clarithromycin] Psychosis       Medication Documentation Review Audit       Reviewed by Raymond Zamora DO (Physician) on 12/20/24 at 1111      Medication Order Taking? Sig Documenting Provider Last Dose Status   cariprazine (Vraylar) 3 mg capsule [id number] Yes Take 1 capsule (3 mg) by mouth once daily. Historical Provider, MD Taking Active   ibuprofen 200 mg tablet [id number] Yes Take 1 tablet (200 mg) by mouth every 6 hours if needed for mild pain (1 - 3). Historical Provider, MD  Active   levonorgestrel (Mirena) 21 mcg/24 hours (8 yrs) 52 mg IUD [id number] Yes 52 mg by intrauterine route 1 time. IN 2/12/24  OUT 2/32 Historical Provider, MD Taking Active   pseudoephedrine (Sudafed) 30 mg tablet [id number] Yes Take 1 tablet (30 mg) by mouth every 4 hours if needed for congestion. Historical Provider, MD  Active   Simponi 100 mg/mL syringe [id number] Yes Inject 1 mL (100 mg) under the skin every 30 (thirty) days. Historical Provider, MD Taking Active                        Family History   Problem Relation Name Age of Onset    Skin cancer Mother Neil Strange     Hypertension Mother Neil Strange     Ankylosing spondylitis Father Neil Strange     Hypertension Father Neil Strange     Skin cancer Father Neil Strange     Arthritis Father Neil Strange     Cancer Father Neil Strange     Skin cancer Sister            Social History     Tobacco Use    Smoking status: Never     Passive exposure: Never     "Smokeless tobacco: Never   Vaping Use    Vaping status: Never Used   Substance Use Topics    Alcohol use: Yes     Alcohol/week: 1.0 standard drink of alcohol     Types: 1 Glasses of wine per week     Comment: 1x week    Drug use: Never         Review of Systems    REVIEW OF SYSTEMS:  Constitutional: Mild fatigue  Skin:  No rash or psoriasis or photosensitvity  Head: No headache, oral ulcers or hair loss  Neck: Has difficulty swallowing but choking  Eyes: No dry eyes or iritis  Mouth: Has dry mouth  Pulmonary: No wheezing, pleurisy or SOB  Cardiovascular: No chest pain or palpitations  Gastrointestinal: Heartburn with NSAIds  Endocrine: No Raynaud's   Musculoskeletal: As H/P        PHYSICAL EXAM:  Blood pressure 126/78, pulse 94, height 1.626 m (5' 4\"), weight 80.6 kg (177 lb 12.8 oz), not currently breastfeeding.      General - NAD, sitting up in chair, well-groomed, pleasant, AAOx3  Head: Normocephalic, atraumatic  Eyes - PERRLA, EOMI. No conjunctiva injection.   Mouth/ENT -  No facial rash. Skin - No rashes or ulcers.   Skin warm and dry. No erythema on bilateral cheeks.  Extremities - No edema, cyanosis ,or clubbing  Neurological - Alert and oriented x 3,  grossly intact. No focal deficit.  Musculoskeletal -  EXAMJOINTDETAILED,  No joint swelling or tenderness  Sacroiliac joints: No local tenderness. JEFFERSON negative.   Hips: Full ROM.  No malalignment.  Knees:  Full ROM, without pain, no swelling, warmth or point tenderness. No joint line tenderness, has pes anserine tenderness.  Ankles: Full ROM, without pain, swelling, warmth or tenderness.  Back: No restriction of movement     DATE OF EXAM: Jan 16 2024  1:10PM      X   5213  -  XR KNEE SURVEY 1V  AP ARABELLA  / ACCESSION #  [id number]     PROCEDURE REASON: Ankylosing spondylitis of sacral region (HCC)          * * * * Physician Interpretation * * * *      EXAMINATION:  XR KNEE SURVEY 1V  AP AARBELLA     HISTORY:   Akylosing spondylitis, patient states having bilateral " knee   pain since late November, pain in left knee worse than pain in right knee    Ankylosing spondylitis of sacral region (HCC)   .     TECHNIQUE:  XR KNEE SURVEY 1V  AP ARABELLA      Laterality:  NOT APPLICABLE      Number of different views (projections): 1      M:  XB_1     COMPARISON:     RESULT:     Mild narrowing of the medial compartments bilaterally and minimal   narrowing of the lateral compartments. Small marginal osteophytes   medially bilaterally. Partially seen right femoral intramedullary dinesh.          Assessment/Plan: 34 yr old with H/O RENE with positive PEG in a titer 1:1280 and HLAB-27+. She has dry mouth and a very high titer PEG. Will get a minor salivary gland biopsy.    No imaging available to confirm sacroiliitis. Xray of pelvis is normal. Will get an MRI of the SI joint.   Her CRP was elevated.  Knee has mild arthritis and anserine bursitis. Will refer to PT.       Renee Brenner MD

## 2025-01-09 ENCOUNTER — OFFICE VISIT (OUTPATIENT)
Dept: OTOLARYNGOLOGY | Facility: CLINIC | Age: 35
End: 2025-01-09
Payer: COMMERCIAL

## 2025-01-09 VITALS
DIASTOLIC BLOOD PRESSURE: 74 MMHG | SYSTOLIC BLOOD PRESSURE: 106 MMHG | BODY MASS INDEX: 29.53 KG/M2 | HEART RATE: 86 BPM | WEIGHT: 173 LBS | TEMPERATURE: 98.4 F | HEIGHT: 64 IN

## 2025-01-09 DIAGNOSIS — R07.0 THROAT PAIN: ICD-10-CM

## 2025-01-09 DIAGNOSIS — H72.91 PERFORATION OF RIGHT TYMPANIC MEMBRANE: ICD-10-CM

## 2025-01-09 DIAGNOSIS — R49.0 HOARSENESS: ICD-10-CM

## 2025-01-09 DIAGNOSIS — K21.9 LARYNGOPHARYNGEAL REFLUX (LPR): Primary | ICD-10-CM

## 2025-01-09 DIAGNOSIS — R09.A2 GLOBUS SENSATION: ICD-10-CM

## 2025-01-09 PROCEDURE — 31575 DIAGNOSTIC LARYNGOSCOPY: CPT | Performed by: NURSE PRACTITIONER

## 2025-01-09 PROCEDURE — 99203 OFFICE O/P NEW LOW 30 MIN: CPT | Performed by: NURSE PRACTITIONER

## 2025-01-09 PROCEDURE — 3008F BODY MASS INDEX DOCD: CPT | Performed by: NURSE PRACTITIONER

## 2025-01-09 PROCEDURE — 99213 OFFICE O/P EST LOW 20 MIN: CPT | Mod: 25 | Performed by: NURSE PRACTITIONER

## 2025-01-09 RX ORDER — OMEPRAZOLE 40 MG/1
CAPSULE, DELAYED RELEASE ORAL
Qty: 30 CAPSULE | Refills: 1 | Status: SHIPPED | OUTPATIENT
Start: 2025-01-09

## 2025-01-09 SDOH — ECONOMIC STABILITY: FOOD INSECURITY: WITHIN THE PAST 12 MONTHS, THE FOOD YOU BOUGHT JUST DIDN'T LAST AND YOU DIDN'T HAVE MONEY TO GET MORE.: NEVER TRUE

## 2025-01-09 SDOH — ECONOMIC STABILITY: FOOD INSECURITY: WITHIN THE PAST 12 MONTHS, YOU WORRIED THAT YOUR FOOD WOULD RUN OUT BEFORE YOU GOT MONEY TO BUY MORE.: NEVER TRUE

## 2025-01-09 ASSESSMENT — ENCOUNTER SYMPTOMS
ABDOMINAL PAIN: 0
DIARRHEA: 0
NECK PAIN: 1
COUGH: 1
SWOLLEN GLANDS: 1
SORE THROAT: 1
HEADACHES: 0
VOMITING: 0
TROUBLE SWALLOWING: 1
DEPRESSION: 0
SHORTNESS OF BREATH: 0
LOSS OF SENSATION IN FEET: 0
OCCASIONAL FEELINGS OF UNSTEADINESS: 0
HOARSE VOICE: 1
STRIDOR: 0

## 2025-01-09 ASSESSMENT — PATIENT HEALTH QUESTIONNAIRE - PHQ9
1. LITTLE INTEREST OR PLEASURE IN DOING THINGS: NOT AT ALL
SUM OF ALL RESPONSES TO PHQ9 QUESTIONS 1 AND 2: 0
2. FEELING DOWN, DEPRESSED OR HOPELESS: NOT AT ALL

## 2025-01-09 ASSESSMENT — COLUMBIA-SUICIDE SEVERITY RATING SCALE - C-SSRS
6. HAVE YOU EVER DONE ANYTHING, STARTED TO DO ANYTHING, OR PREPARED TO DO ANYTHING TO END YOUR LIFE?: NO
1. IN THE PAST MONTH, HAVE YOU WISHED YOU WERE DEAD OR WISHED YOU COULD GO TO SLEEP AND NOT WAKE UP?: NO
2. HAVE YOU ACTUALLY HAD ANY THOUGHTS OF KILLING YOURSELF?: NO

## 2025-01-09 ASSESSMENT — PAIN SCALES - GENERAL: PAINLEVEL_OUTOF10: 0-NO PAIN

## 2025-01-09 ASSESSMENT — LIFESTYLE VARIABLES
HOW OFTEN DO YOU HAVE A DRINK CONTAINING ALCOHOL: 2-4 TIMES A MONTH
AUDIT-C TOTAL SCORE: 2
HOW MANY STANDARD DRINKS CONTAINING ALCOHOL DO YOU HAVE ON A TYPICAL DAY: 1 OR 2
HOW OFTEN DO YOU HAVE SIX OR MORE DRINKS ON ONE OCCASION: NEVER
SKIP TO QUESTIONS 9-10: 1

## 2025-01-09 NOTE — PROGRESS NOTES
Subjective   Patient ID: Fang Carvalho is a 34 y.o. female who presents for Throat Problem.    HPI  Patient here for her throat.  In December she had a throat infection, had a lot of pain on the right side. Had swelling in the neck. Saw PCP 12/19 who started her on liquid Cefdinir and a steroid pack. She felt good for about 1 week, then symptoms returned.   Started her Simponi injection.  She is on Augmentin now. No trouble swallowing food. She is having issues with liquids, improving with the Augmentin.   She does have dry mouth.   She has a history of chronic hoarseness, was told she has a cyst on her vocal cords in the past. She hasn't had changes in her voice recently. When she had the first infection lose her voice completely. Low grade fever at the initial infection.   Feels something in her throat. She does get acid reflux/heartburn, not on medication. Will take OTC omeprazole when having a flare up.     I reviewed patient's past medical and surgical history.  Patient Active Problem List   Diagnosis    Ankylosing spondylitis    Anxiety disorder    Bipolar II disorder, severe, depressed, with mixed features, in full remission (Multi)    Gastroesophageal reflux disease without esophagitis    HLA B27 positive    High risk pregnancy, antepartum (HHS-HCC)    PCOS (polycystic ovarian syndrome)    Polyarticular juvenile idiopathic arthritis (Multi)    Vitamin D deficiency, unspecified    Rubella non-immune status, antepartum (HHS-HCC)    Chronic low back pain    Routine postpartum follow-up    Encounter for counseling regarding contraception    IUD (intrauterine device) in place    Fatigue     Past Surgical History:   Procedure Laterality Date    FEMUR FRACTURE SURGERY      FRACTURE SURGERY  4/1/2014    OTHER SURGICAL HISTORY  01/05/2023    Femur fracture repair      Review of Systems    All other systems have been reviewed and are negative for complaints except for those mentioned in history of present illness,  past medical history and problem list.    Objective   Physical Exam    Constitutional: No fever, chills, weight loss or weight gain  General appearance: Appears well, well-nourished, well groomed. No acute distress.    Communication: Normal communication    Psychiatric: Oriented to person, place and time. Normal mood and affect.    Neurologic: Cranial nerves II-XII grossly intact and symmetric bilaterally.    Head and Face:  Head: Atraumatic with no masses, lesions or scarring.  Face: Normal symmetry. No scars or deformities.  TMJ: Normal, no trismus.    Eyes: Conjunctiva not edematous or erythematous.     Right Ear: External inspection of ear with no deformity, scars, or masses. EAC is clear.  TM is with tympanic membarne perforation, clean and dry. No evidence of infection. No perforation seen.     Left Ear: External inspection of ear with no deformity, scars, or masses. EAC is clear.  TM is intact with no sign of infection, effusion, or retraction.  No perforation seen.     Nose: External inspection of nose: No nasal lesions, lacerations or scars. Anterior rhinoscopy with limited visualization past the inferior turbinates. No tenderness on frontal or maxillary sinus palpation.    Oral Cavity/Mouth: Oral cavity and oropharynx mucosa moist and pink. No lesions or masses. Tonsils stone on the left [id number]. Uvula is midline. Tongue with no masses or lesions. Tongue with good mobility. The oropharynx is clear.    Neck: Normal appearing, symmetric, trachea midline.     Cardiovascular: Examination of peripheral vascular system shows no clubbing or cyanosis.    Respiratory: No respiratory distress increased work of breathing. Inspection of the chest with symmetric chest expansion and normal respiratory effort.    Skin: No head and neck rashes.    Lymph nodes: No adenopathy.    I reviewed Amaya Vega's previous clinic note.    Procedure: Diagnostic Laryngoscopy, Flexible  Indication: To visualize larynx.   Risks,  benefits, alternatives, and expectations discussed with patient and she wishes to proceed.    FINDINGS:  A mixture of 4% topical Lidocaine and Oxymetazoline was administered into the nostril. The nasopharynx and oropharynx were normal without any lesions or masses visualized. There is thick mucous at the nasopharynx. No masses or lesions were visualized at the base of tongue, vallecula, epiglottis, aryepiglottic folds, pyriform sinuses, and lateral pharyngeal walls.  True vocal fold movement was normal. There is moderate post-cricoid edema. There is irritation at the TVC, small lesion/nodule/irritation at the left TVC and mild on the right.  The patient's airway was widely patent with no evidence of obstruction.  Patient tolerated the procedure well, and there were no complications.     Assessment/Plan   Diagnoses and all orders for this visit:  Hoarseness  Globus sensation  Throat pain  Laryngopharyngeal reflux (LPR)  -     omeprazole (PriLOSEC) 40 mg DR capsule; Take daily before breakfast or 30 minutes before your biggest meal.  Perforation of right tympanic membrane    Laryngoscopy performed. Reassurance given.  - Start Omeprazole 40 mg daily. Take this 30 minutes before breakfast or before your biggest meal. Follow dietary/lifestyle modifications as listed below.    - Start sinus rinses. This can be purchased over the counter, see handout.    I'd like to follow up in 6-8 weeks. She is seeing my partner Amaya Vega for an ear check on 2/20/2025. She will also follow up regarding her throat at that time since she lives in [place]. She can follow up back with me if symptoms aren't improving and we may obtain MBS vs referral laryngology.    All questions answered to patient satisfaction.    LARYNGOPHARYNGEAL REFLUX (LPR)   A common cause of hoarseness or voice changes is gastroesophageal reflux disease (GERD). GERD occurs when stomach acid flows back up into the esophagus (swallowing tube). When the acid  "reaches the throat, it is called laryngopharyngeal reflux (LPR) or silent reflux. It is called \"silent\" because up to 50% of people with LPR have no heartburn or stomach upset. This reflux can cause inflammation and swelling in the throat or in the larynx (voice box) and can result in a number of different symptoms.  · Hoarseness  · Sensation of lump in the throat (globus sensation)   · Frequent throat clearing  · Cough (may wake you up at night)  · Mucous sticking in the throat  · Low grade sore throat  · Worsening of asthma   · Worsening of sinus drainage or post nasal drip    The diagnosis of LPR as a cause of throat symptoms is usually based on classic symptoms and physical findings of inflammation in the throat (back part of the voicebox) that is assessed by performing a small in office procedure called a flexible nasopharyngoscopy. Often the patient is treated for LPR without any further testing and more significant testing is usually not needed. However, on some occasion, some additional tests may be required such as a swallowing study with barium, an endoscopic evaluation of the esophagus and stomach, or a probe that measures acidity (pH) in the throat and esophagus.  The most important treatment of LPR is lifestyle changes and dietary control. Lifestyle changes include talking with your primary healthcare provider about losing weight if you are currently overweight.  If you smoke, quit! Your primary healthcare provider will have suggestions to help you quit smoking. Avoid eating anything for at least 3-4 hours before bedtime.  Elevate the head of the bed by using extra pillows or placing blocks under the legs of the bed to help reduce the amount of acid reaching your throat as you lay down. Avoid tight, binding. Below is a list of dietary guidelines:  · Eat smaller, more frequent meals rather than large one.   · Avoid food or liquids for 2-3 hours before lying down (no bedtime snacks!)   · Avoid or limit " caffeinated products such as coffee, tea, soda, and chocolate  · Avoid or limit red sauces and salsa  · Avoid or limit fatty, fried, spicy or greasy foods  · Avoid or limit citric juices such as orange and grapefruit juice  · Avoid or limit mints such as peppermint and spearmint  · Avoid or limit alcohol   You may be prescribed anti-reflux medication.  These are most commonly the class of protein pump inhibitors such as Omeprazole, Esomeprazole, Pantoprazole, and Lansoprazole. These medications act by preventing acid production, not by neutralizing acid already present in the stomach and need to be taken 30 minutes before your biggest meal.  These medications can take up to 4 weeks to have effect, so it is important to take these consistently along with lifestyle and dietary modifications until your follow up appointment.    Untreated LPR can lead to chronic swelling of the vocal folds, ulcerations of the vocal folds and formation of masses known as granulomas.         OSBALDO Hughes-CNP 01/09/25 11:48 AM

## 2025-01-09 NOTE — PATIENT INSTRUCTIONS
"- Start Omeprazole 40 mg daily. Take this 30 minutes before breakfast or before your biggest meal. Follow dietary/lifestyle modifications as listed below.    - Start sinus rinses. This can be purchased over the counter, see handout.    LARYNGOPHARYNGEAL REFLUX (LPR)   A common cause of hoarseness or voice changes is gastroesophageal reflux disease (GERD). GERD occurs when stomach acid flows back up into the esophagus (swallowing tube). When the acid reaches the throat, it is called laryngopharyngeal reflux (LPR) or silent reflux. It is called \"silent\" because up to 50% of people with LPR have no heartburn or stomach upset. This reflux can cause inflammation and swelling in the throat or in the larynx (voice box) and can result in a number of different symptoms.  · Hoarseness  · Sensation of lump in the throat (globus sensation)   · Frequent throat clearing  · Cough (may wake you up at night)  · Mucous sticking in the throat  · Low grade sore throat  · Worsening of asthma   · Worsening of sinus drainage or post nasal drip    The diagnosis of LPR as a cause of throat symptoms is usually based on classic symptoms and physical findings of inflammation in the throat (back part of the voicebox) that is assessed by performing a small in office procedure called a flexible nasopharyngoscopy. Often the patient is treated for LPR without any further testing and more significant testing is usually not needed. However, on some occasion, some additional tests may be required such as a swallowing study with barium, an endoscopic evaluation of the esophagus and stomach, or a probe that measures acidity (pH) in the throat and esophagus.  The most important treatment of LPR is lifestyle changes and dietary control. Lifestyle changes include talking with your primary healthcare provider about losing weight if you are currently overweight.  If you smoke, quit! Your primary healthcare provider will have suggestions to help you quit " smoking. Avoid eating anything for at least 3-4 hours before bedtime.  Elevate the head of the bed by using extra pillows or placing blocks under the legs of the bed to help reduce the amount of acid reaching your throat as you lay down. Avoid tight, binding. Below is a list of dietary guidelines:  · Eat smaller, more frequent meals rather than large one.   · Avoid food or liquids for 2-3 hours before lying down (no bedtime snacks!)   · Avoid or limit caffeinated products such as coffee, tea, soda, and chocolate  · Avoid or limit red sauces and salsa  · Avoid or limit fatty, fried, spicy or greasy foods  · Avoid or limit citric juices such as orange and grapefruit juice  · Avoid or limit mints such as peppermint and spearmint  · Avoid or limit alcohol   You may be prescribed anti-reflux medication.  These are most commonly the class of protein pump inhibitors such as Omeprazole, Esomeprazole, Pantoprazole, and Lansoprazole. These medications act by preventing acid production, not by neutralizing acid already present in the stomach and need to be taken 30 minutes before your biggest meal.  These medications can take up to 4 weeks to have effect, so it is important to take these consistently along with lifestyle and dietary modifications until your follow up appointment.    Untreated LPR can lead to chronic swelling of the vocal folds, ulcerations of the vocal folds and formation of masses known as granulomas.    Welcome to Fang Hoff's clinic. We are here to assist you through your ENT care at [facility].  Fang is a Nurse Practitioner who specializes in General ENT. This means that she specializes in taking care of patients with usual ENT issues such as nasal congestion, allergy symptoms, sinusitis, hearing loss, ear infections, ear wax removal, hoarseness, sore throat, throat infections, reflux and some swallowing issues. She also sees patients regarding dizziness and vertigo.   Brigida is Fang's   and she answers the office phone from 7:30am-4pm Mon-Fri. Call [phone]. She can help you with scheduling of appointments, and general questions and information. You may need to leave a message if she is helping another patient. In this case, someone from the team will call you back the same day if you leave your message before 3pm, or the next business morning.  Fang currently sees patients at [facility] on Mondays, Wednesdays and Thursdays.  She works closely with audiologists to solve issues with hearing. She is also in very close contact with her collaborative physicians. Dr. Chugn, a surgeon who specializes in general ENT and rhinology. She also works closely with otologist (ear surgeon) Dr. Tripp and head and neck surgery Dr. Ray.   Others who may be included in your care are dieticians, social workers, allergists, gastroenterologists, neurologists, and physical therapists. Fang will provide these referrals as needed. Please let her know if you would like to request a specific referral.  Fang makes every effort to run on time for your appointments. Therefore, if you are more than 15 minutes late unrelated to a scan or another appointment such as therapy or audiology, your appointment will need to be rescheduled for another day. We appreciate your understanding.   We look forward to working with you to meet your healthcare goals.

## 2025-01-21 ENCOUNTER — HOSPITAL ENCOUNTER (OUTPATIENT)
Dept: RADIOLOGY | Facility: CLINIC | Age: 35
Discharge: HOME | End: 2025-01-21
Payer: COMMERCIAL

## 2025-01-21 DIAGNOSIS — Z15.89 HLA B27 (HLA B27 POSITIVE): ICD-10-CM

## 2025-01-21 PROCEDURE — 72195 MRI PELVIS W/O DYE: CPT

## 2025-02-02 LAB — CRP SERPL-MCNC: 6.6 MG/L

## 2025-02-05 ENCOUNTER — EVALUATION (OUTPATIENT)
Dept: PHYSICAL THERAPY | Facility: CLINIC | Age: 35
End: 2025-02-05
Payer: COMMERCIAL

## 2025-02-05 DIAGNOSIS — M70.51 PES ANSERINUS BURSITIS OF BOTH KNEES: ICD-10-CM

## 2025-02-05 DIAGNOSIS — M70.52 PES ANSERINUS BURSITIS OF BOTH KNEES: ICD-10-CM

## 2025-02-05 PROCEDURE — 97162 PT EVAL MOD COMPLEX 30 MIN: CPT | Mod: GP

## 2025-02-05 PROCEDURE — 97112 NEUROMUSCULAR REEDUCATION: CPT | Mod: GP

## 2025-02-05 NOTE — PROGRESS NOTES
Physical Therapy    Physical Therapy Evaluation    Patient Name: Fang Carvalho  MRN: [id number]  Today's Date: 2025   confirmed verbally by patient.    Time Entry:   Time Calculation  Start Time: 1155  Stop Time: 1250  Time Calculation (min): 55 min  PT Evaluation Time Entry  PT Evaluation (Moderate) Time Entry: 30  PT Therapeutic Procedures Time Entry  Neuromuscular Re-Education Time Entry: 25          Reason for Visit  Referred by: Renee Brenner MD  Referral DX:  Pes anserinus bursitis of both knees [M70.51, M70.52]     Insurance:  Visit: #1  Authorized: to be determined  Payor/Plan: Jame       Current Problem  1. Pes anserinus bursitis of both knees  Referral to Physical Therapy          Subjective   Date of Onset: ~1 year ago after most recent pregnancy  Chief Complaint: Bilateral knee pain     Patient presents to physical therapy with complaints of bilateral knee pain that originally began when she was younger and diagnosed with idiopathic arthritis. After her most recent pregnancy she began to notice worsening of knee pain bilaterally, which has consistently been a bothersome pain with prolonged knee flexion postures and descending stairs (which is required for her home daily). Patient denies any current treatment for her knee pain prior to beginning physical therapy. She has had physical therapy in the past due to right femoral dinesh placement after ski accident, performed aquatic therapy and physical therapy at the time.     Patient is planning to go on skiing trip in the upcoming week where she has also been prescribed knee braces for current support and discussed with referring provider the need to address current symptoms.         Functional limitations: going downstairs, skiing, sitting/standing for prolonged periods of time     Home environment: 2nd floor home (has to ascend/descend a flight of stairs)     Work status/occupation: at home  (prefers to sit with feet reclined)      Recreational activity: skiing       Patient goals: improve knee flexibility and strength       Pain:   5/10 (sitting at prolonged knee flexed postures)   Pain Description:   Dull ache       Recent Imaging:  XR of right knee (1/16/24)  Mild narrowing of the medial compartments bilaterally and minimal   narrowing of the lateral compartments. Small marginal osteophytes   medially bilaterally. Partially seen right femoral intramedullary dinesh     MR of sacrum (1/21/25)  1. No evidence to suggest sacroiliitis.  2. Incidental note is made of benign-appearing S4 and S5 lesions without aggressive features favoring indolent fibro-osseous lesions versus remote healed fracture sequela    XR of pelvis (11/25/24)  Postsurgical changes in the right proximal femur.    There is no fracture. There is no dislocation. There are no degenerative changes.  No sclerosis or erosion seen in this SI joints.   There is no lytic or sclerotic lesion. There is no soft tissue abnormality seen.  Intrauterine device over the pelvis      Reviewed medical screening history form with patient: Yes,        Barriers Impacting Care:  (+) Rheumatic Condition   Diagnosis of ankylosing spondylitis in the sacral region   Previous history of idiopathic arthritis         Precautions:   Patient has positive HLA-B27 diagnosis confirming rheumatoid factor  Diagnosis of ankylosing spondylitis in the sacral region   Previous history of idiopathic arthritis         Objective    Range of Motion:   Knee EXT-FLEXION  R = 0-125 A, 0-130 P with empty end-feel   L = 0-125 A, 0-130 P with empty end-feel       Hip ROM (supine)  Flexion =   (L)  WNL A + P with  soft end-feel   (R)  WNL A, + P with soft end-feel   Extension   (L) =  WNL P with muscular end-feel   (R) = WNL P with muscular end-feel   IR  (L) =  WNL P with firm end-feel   (R) =  WNL P with firm end-feel   ER  (L) =  60 P with muscular end-feel   (R) =  50 P with muscular end-feel    Hip AROM (seated)   IR  (L)  "=  40 A  (R) =  47 A    ER  (L) =  25 A  (R) =  27 A        Patellar Mobility:  Superior =   L = 2+  R = 2+  Inferior =   L = 2+  R = 2+  Medial =  L = 2+  R = 2+  Lateral =   L = 3+  R = 3+       Strength:   Knee  Extension (seated in neutral position)  LLE = 4+/5 without pain   RLE = 4/5 without pain     Hip   Gluteus medius   LLE = 4/5   RLE = 3+/5   Gluteus Yrn   LLE = 4/5   RLE = 3+/5   Flexion (seated)   LLE = 4-/5   RLE = 4-/5   IR (seated)  (L) =  4/5   (R) =  4-/5     ER (seated)  (L) =  4/5  (R) = 3+/5       Palpation:   TTP   LLE = quadriceps tendon, VMO  RLE = pes anserine, LCL, lateral joint line, semitendinosus (tendon distally), semimembranosus (tendon distally)        Special Tests:  Knee  Lachman's: (-) bilat  Anterior Drawer: (+) for instability and pain on the left ; (-) right   Left palpable posterior translation prior to assessment   Posterior drawer: (-) bilat   Varus   0°: (R) ; (L)  30°: (R) ; (L)  Valgus   0°: (R) ; (L)   30°:  (R) ; (L)  Benjamin: (-) bilat   Thessaly: not tested   Patellar compression: (+) bilat         Gait:   Bilateral femoral IR and foot pronation during loading response  Slight flexed posture at the trunk throughout       Function:  SL front step down (6 inch step with bilateral UE support)   R = dynamic knee valgus with pain reported at quadriceps tendon   L = dynamic knee valgus with pain reported at lateral joint line and VL       \"Key Sign\" = SL step down tap (6 inch step)       TREATMENT  Patient education   Findings discussed with functional assessment and how they correlate with additional assessments at the hip and knee including ROM and strength   Educated patient on course of treatment.   Performance of HEP as it relates to progression in rehab and current symptoms     Neuromuscular Re-education = 2 units  (Access Code: [email or web address])  - Sidelying Hip Abduction  - 1 x daily - 7 x weekly - 2 sets - 5 reps  - Single Leg Bridge  - 1 x daily - 7 x weekly - 2 sets " - 5 reps  - Seated Hip External Rotation with Resistance  - 1 x daily - 7 x weekly - 2 sets - 15-20 reps      OP Education: HEP: Patient verbalizes and demonstrates understanding of home exercises. Will contact writer if with questions or if condition worsens. Discussed       Outcome Measures:    LEFS = 46/80    Assessment  PT Diagnosis: Patient presents with signs and symptoms consistent with quadriceps tendinopathy with likelihood of intra-articlar dysfunction of the tibiofemoral and patellofemoral joints bilaterally. Evaluation demonstrates near full ROM at the tibiofemoral joints with painful overpressure into knee flexion and extension (bilat), ACL laxity (L) and MCL laxity (R), and proximal hip weakness (bilat) lending to dynamic knee valgus. Patient's prior history of femoral dinesh placement likely contributes to right sided hip weakness when compared to the left. Interventions initiated in session targeted the proximal hip and posterior chain to offload the knee joint and quadriceps tendon, which is tolerated well without provoking additional symptoms.     Moderate complexity evaluation based on involvement of underlying rheumatic condition impacting the spine and pelvis in conjunction with current musculoskeletal impairments resulting in bilateral knee symptoms. Skilled PT required to return to prior level of function and maximize functional outcome. Likely to benefit from skilled care.        Plan  Therapeutic exercises to improve ROM and strength of the proximal hip, knee, and ankle musculature; neuromuscular re-education to promote proper engagement of proximal hip, knee, and ankle musculature; manual therapy for joint mobility and soft tissue tightness; therapeutic activity to promote return to prior level of function; aquatic therapy; taping; biofeedback; cold therapy; hot packs; electrical stimulation; Patient plan will consist of 2 days/week for 12 weeks.    Next Visit Plan:  Assess patient response to  initial interventions   Introduce partial step up/downs with small box for improving stair navigation at home   Introduce proper tendon loading to the quadriceps tendon bilaterally       Goals:  Patient will be independent with HEP.  Patient will report improvement in LEFS outcome measure >55/80 in 4 weeks.   Patient will report improvement in LEFS outcome measure >64/80 in 8 weeks.   Patient will demonstrate at least 4-/5 proximal hip MMT bilaterally in 4 weeks to improve strength of hip and trunk.   Patient will demonstrate at least 4/5 proximal hip MMT bilaterally in 8 weeks to improve strength of hip and trunk.   Patient will either report or demonstrate being able to ascend/descend a flight of stairs reciprocally with <2/10 pain in bilateral knees in 8 weeks.   Patient will demonstrate the ability to lift 10# item from the floor to waist height with proper lifting mechanics and <3/10 pain in 4 weeks for return to prior level of function.  Patient will demonstrate the ability to lift 20# item from the floor to waist height with proper lifting mechanics and without pain in  8 weeks.         Axel Santacruz PT, DPT

## 2025-02-11 ENCOUNTER — TREATMENT (OUTPATIENT)
Dept: PHYSICAL THERAPY | Facility: CLINIC | Age: 35
End: 2025-02-11
Payer: COMMERCIAL

## 2025-02-11 DIAGNOSIS — M25.562 CHRONIC PAIN OF BOTH KNEES: ICD-10-CM

## 2025-02-11 DIAGNOSIS — G89.29 CHRONIC PAIN OF BOTH KNEES: ICD-10-CM

## 2025-02-11 DIAGNOSIS — M70.51 PES ANSERINUS BURSITIS OF BOTH KNEES: ICD-10-CM

## 2025-02-11 DIAGNOSIS — M22.2X2 PATELLOFEMORAL DISORDER OF BOTH KNEES: Primary | ICD-10-CM

## 2025-02-11 DIAGNOSIS — M25.561 CHRONIC PAIN OF BOTH KNEES: ICD-10-CM

## 2025-02-11 DIAGNOSIS — M22.2X1 PATELLOFEMORAL DISORDER OF BOTH KNEES: Primary | ICD-10-CM

## 2025-02-11 DIAGNOSIS — M70.52 PES ANSERINUS BURSITIS OF BOTH KNEES: ICD-10-CM

## 2025-02-11 PROCEDURE — 97112 NEUROMUSCULAR REEDUCATION: CPT | Mod: GP

## 2025-02-11 PROCEDURE — 97110 THERAPEUTIC EXERCISES: CPT | Mod: GP

## 2025-02-11 NOTE — PROGRESS NOTES
"Physical Therapy    Physical Therapy Treatment    Patient Name: Fang Carvalho  MRN: [id number]  Today's Date: 2/11/2025  Time Calculation  Start Time: 0810  Stop Time: 0850  Time Calculation (min): 40 min        Insurance:  Visit: #2  Authorized: to be determined  Certification:  to be determined  Payor: MIKE / Plan: MIKE HMP / Product Type: *No Product type* /       Subjective:  Patient reports to physical therapy 10 minutes late with no worsening of symptoms since the initial evaluation. She has been compliant with current HEP. Patient has skiing trip this upcoming weekend and plans to wear bilateral knee braces for additional support and will continue with physical therapy when she returns.         Current pain: 1/10        Objective:  Gait = maintains dynamic valgus throughout stance phase     Hip ER PROM  L = 25 (90/90 POSITION)  R = 35 (90/90 POSITION)        Treatment:  Therapeutic Exercise  = 1 unit  Pedal bike = 3 minutes full revolution   Supine hip ER isometrics with glute ER stretch     Neuromuscular Re-education = 2 units  Single leg glute bridge with banded hip add + abd (green thick band) = 2x5 each side   Sidelying glute med isometrics   Holding = 1x5x5\" each side   Pushing = 1x5x5\" each side   Sidelying clam shell isometrics   Pushing = 1x5x5\" each side       HEP: #1 (Access Code: [email or web address])  - Sidelying Hip Abduction  - 1 x daily - 7 x weekly - 2 sets - 5 reps  - Single Leg Bridge  - 1 x daily - 7 x weekly - 2 sets - 5 reps  - Seated Hip External Rotation with Resistance  - 1 x daily - 7 x weekly - 2 sets - 15-20 reps          Diagnosis:  1. Patellofemoral disorder of both knees    2. Chronic pain of both knees          Assessment:   Pt tolerates the use of pushing and holding isometrics throughout the session targeting the proximal hip musculature, noting greater difficulties in strength on the RLE > LLE as a result of previous RLE femoral dinesh placement. Patient is able to perform single leg " glute bridges with band resisted into hip abduction and adduction, again increasing difficulty on the RLE compared to the LLE. Patient is educated on the need to further improve her quadriceps tendon load capacity in conjunction with current POC and interventions, but will not begin until after ski trip to reduce spike in inflammatory response to bilateral knees. Patient will continue to benefit from physical therapy services in order to return to prior level of function without compensatory motions during active movements.           Plan:  Continue POC as tolerated  Improve posterolateral hip musculature strength and re-education in open and closed chain positions   Improve hip ER PROM/AROM bilaterally with manual therapy and muscle activation       Axel Santacruz, PT

## 2025-02-17 ENCOUNTER — APPOINTMENT (OUTPATIENT)
Dept: PHYSICAL THERAPY | Facility: CLINIC | Age: 35
End: 2025-02-17
Payer: COMMERCIAL

## 2025-02-18 ENCOUNTER — TREATMENT (OUTPATIENT)
Dept: PHYSICAL THERAPY | Facility: CLINIC | Age: 35
End: 2025-02-18
Payer: COMMERCIAL

## 2025-02-18 DIAGNOSIS — M22.2X1 PATELLOFEMORAL DISORDER OF BOTH KNEES: ICD-10-CM

## 2025-02-18 DIAGNOSIS — M70.52 PES ANSERINUS BURSITIS OF BOTH KNEES: ICD-10-CM

## 2025-02-18 DIAGNOSIS — M22.2X2 PATELLOFEMORAL DISORDER OF BOTH KNEES: ICD-10-CM

## 2025-02-18 DIAGNOSIS — M70.51 PES ANSERINUS BURSITIS OF BOTH KNEES: ICD-10-CM

## 2025-02-18 PROCEDURE — 97110 THERAPEUTIC EXERCISES: CPT | Mod: GP

## 2025-02-18 PROCEDURE — 97140 MANUAL THERAPY 1/> REGIONS: CPT | Mod: GP

## 2025-02-18 PROCEDURE — 97112 NEUROMUSCULAR REEDUCATION: CPT | Mod: GP

## 2025-02-18 NOTE — PROGRESS NOTES
Physical Therapy    Physical Therapy Treatment    Patient Name: Fang Carvalho  MRN: [id number]  Today's Date: 2/18/2025  Time Calculation  Start Time: 0800  Stop Time: 0845  Time Calculation (min): 45 min        Insurance:  Visit: #3  Authorized: 20  Certification:  2/10/2025 - 2/10/2026   Payor: MIKE / Plan: MIKE [employer]MP / Product Type: *No Product type* /       Subjective:  Patient reports to physical therapy after recent skiing trip without any worsening of bilateral knee symptoms. Throughout her most recent trip patient denies any increased soreness or reproduction of symptoms in the evenings or the mornings following long days of skiing or when sitting on an airplane for an extended period of time. She continues to remain compliant with current HEP without any current interventions reproducing further symptoms.         Current pain: 0/10        Objective:  Gait = maintains dynamic valgus throughout stance phase     Hip abduction in sidelying = patient observed to have increased flexion of the limb using more of the TFL and hip flexors         Treatment:  Therapeutic Exercise  = 1 unit  Pedal bike = 2 minutes full revolution   Half kneeling adductor stretch with active hip ER = 15x each side  Seated figure 4     Manual Therapy = 1 uynit  IASTM to ITB, rectus femoris, VMO, VL (bilat)  Inferior patellar grade 2 oscillatory mob (bilat)    Neuromuscular Re-education = 1 units  Sidelying hip abduction isometric   Seated hip external rotation with RTB = 25x each side       HEP: #2 (Access Code: [email or web address])  - Half Kneeling Adductor Stretch  - 1 x daily - 7 x weekly - 1 sets - 15 reps  - Seated Figure 4 Piriformis Stretch  - 1 x daily - 7 x weekly - 1 sets - 1 reps - 30-60 seconds hold  - Seated Hip External Rotation with Resistance  - 1 x daily - 7 x weekly - 1 sets - 25-30 reps  - Sidelying Hip Abduction  - 1 x daily - 7 x weekly - 1 sets - 3 reps - 10  second hold  - Single Leg Bridge  - 1 x daily - 3 x weekly - 2  sets - 5 reps          Diagnosis:  1. Pes anserinus bursitis of both knees    2. Patellofemoral disorder of both knees          Assessment:   Pt demonstrates continued tolerance to interventions in session, being introduced to half kneeling adductor stretch and seated figure-4 stretch for the proximal hip musculature. Patient reports increased anterior knee pain originally in the seated figure-4 position but with modification is able to perform without pain obtaining the desired stretch. Altered hip abduction as part of HEP as patient was obtaining additional QL activation from compensatory pelvic hiking, but with verbal and tactile cueing patient able to modify and perform well. Patient discussed the modification to volume and frequency of interventions as part of HEP (see above) to alter neuro re-education for the intended muscle groups and movements to occur daily with large, more intense movements occurring only 3x/week. Patient will continue to benefit from physical therapy services in order to return to prior level of function without compensatory motions during active movements.          Plan:  Continue POC as tolerated  Improve posterolateral hip musculature strength and re-education in open and closed chain positions   Improve hip ER PROM/AROM bilaterally with manual therapy and muscle activation       Axel Santacruz, PT

## 2025-02-20 ENCOUNTER — CLINICAL SUPPORT (OUTPATIENT)
Dept: AUDIOLOGY | Facility: CLINIC | Age: 35
End: 2025-02-20
Payer: COMMERCIAL

## 2025-02-20 ENCOUNTER — APPOINTMENT (OUTPATIENT)
Dept: OTOLARYNGOLOGY | Facility: CLINIC | Age: 35
End: 2025-02-20
Payer: COMMERCIAL

## 2025-02-20 VITALS
HEIGHT: 64 IN | SYSTOLIC BLOOD PRESSURE: 117 MMHG | DIASTOLIC BLOOD PRESSURE: 81 MMHG | WEIGHT: 173 LBS | BODY MASS INDEX: 29.53 KG/M2

## 2025-02-20 DIAGNOSIS — H72.91 PERFORATION OF RIGHT TYMPANIC MEMBRANE: ICD-10-CM

## 2025-02-20 DIAGNOSIS — H90.11 CONDUCTIVE HEARING LOSS OF RIGHT EAR WITH UNRESTRICTED HEARING OF LEFT EAR: ICD-10-CM

## 2025-02-20 DIAGNOSIS — H69.93 DYSFUNCTION OF BOTH EUSTACHIAN TUBES: ICD-10-CM

## 2025-02-20 DIAGNOSIS — H90.11 CONDUCTIVE HEARING LOSS OF RIGHT EAR WITH UNRESTRICTED HEARING OF LEFT EAR: Primary | ICD-10-CM

## 2025-02-20 DIAGNOSIS — H61.23 BILATERAL IMPACTED CERUMEN: Primary | ICD-10-CM

## 2025-02-20 PROCEDURE — 1036F TOBACCO NON-USER: CPT

## 2025-02-20 PROCEDURE — 69210 REMOVE IMPACTED EAR WAX UNI: CPT

## 2025-02-20 PROCEDURE — 92550 TYMPANOMETRY & REFLEX THRESH: CPT | Mod: 52 | Performed by: AUDIOLOGIST

## 2025-02-20 PROCEDURE — 3008F BODY MASS INDEX DOCD: CPT

## 2025-02-20 PROCEDURE — 92557 COMPREHENSIVE HEARING TEST: CPT | Performed by: AUDIOLOGIST

## 2025-02-20 PROCEDURE — 99214 OFFICE O/P EST MOD 30 MIN: CPT

## 2025-02-20 ASSESSMENT — PAIN SCALES - GENERAL: PAINLEVEL_OUTOF10: 0 - NO PAIN

## 2025-02-20 ASSESSMENT — PAIN - FUNCTIONAL ASSESSMENT: PAIN_FUNCTIONAL_ASSESSMENT: 0-10

## 2025-02-20 ASSESSMENT — ENCOUNTER SYMPTOMS: OCCASIONAL FEELINGS OF UNSTEADINESS: 0

## 2025-02-20 NOTE — PROGRESS NOTES
Patient ID: Fang Carvalho is a 34 y.o. female who presents for subsequent evaluation of right ear.     PROVIDER IMPRESSIONS:  DIAGNOSES/PROBLEMS:  -Bilateral cerumen impaction  -Perforation of the right tympanic membrane  -Conductive hearing loss in right ear, unrestricted hearing in left ear    ASSESSMENT:   Fang Carvalho is a pleasant 34 y.o. 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 Since last visit, endorses significant improvement in right hearing function and complete resolution of right otalgia. Based on the clinical information provided, symptoms and clinical exam findings are consistent with chronic right TM perforation, conductive hearing loss in the right ear with unrestricted hearing in the left ear. Exam today revealed right TM with visible 40% central perforation that has not spontaneously healed over the past 4 months. Reassurance provided to patient that bilateral EAC appears with no sign of acute infection or inflammation and that bilateral TM appears with no sign of infection, effusion, retraction. Audiogram from today was reviewed in detail with the patient, which revealed moderate rising to mild low-frequency conductive hearing loss with type B tympanogram in the right ear and unrestricted hearing but type C tympanogram the left ear. Exam revealed appropriate left TM mobility with auto-insufflation under microscopy today, thus my current suspicion for acute left ETD is low.  Patient informed that when compared to prior audiogram from 10/17/24, today's results show stable hearing in the left ear and improvement in conductive hearing loss in the right ear.  Patient offered reassurance and counseling on the current clinical findings and management recommendations. We discussed options for chronic right TM  perforation management with conservative interventions vs. Surgical interventions and candidacy for tympanoplasty. Given patient reports she does not currently experience significant hearing difficulty in the right ear with daily communication, she elected to proceed with conservative/non-surgical management options at this time as detailed in plan below.    PLAN:  I recommended patient follow-up with  audiology to receive fitting for custom swim mold for the right ear. I explained to patient that custom ear mold is recommended for better seal during submersion of head underwater, preventing water entrance into the right middle ear space, thus infection prevention. Patient provided an estimated price quote and elected to proceed with fitting, she was scheduled with audiology at the end of today's visit.   I recommended patient maintains dry ear precautions for the right ear which were reviewed today and are provided in patient instructions section.  I counseled patient on safe interventions for cerumen management at home. Patient may wash the external ear with a cloth. I reinforced education to the patient that they should avoid using cotton tipped applicators, tissues, paper, or any rigid object in the ear canal. I explained to the patient that doing so may cause wax to be pushed back into the ear canal and stuck and also risks injury to the ear canal and ear drum.   Follow-up: Patient may schedule for follow-up in 6 months for routine removal of impacted cerumen. I recommended she follows up in 4 weeks for subsequent evaluation of throat symptoms. She may follow-up sooner, as needed. Patient is agreeable to plan. All questions answered to patient's satisfaction.     At today's visit with Fang SNOWDEN Carvalho, the number of minutes I spent providing patient care was 30. More than 50% of that time was spent counseling the patient on possible etiologies, test results, treatment options and care coordination.      Subjective   HPI: Fang Carvalho is a 34 y.o. female who presents for a 3 month follow-up evaluation for right hearing difficulty, right ear pain, and surveillance of right TM perforation. In review, patient initially presented to me in October 2024 and was treated for right AOE/AOM with spontaneous right TM rupture resulting in secondary right CHL. Since last visit on 11/11/24, the patient reports that significant improvement in right hearing difficulty symptoms and reports that right ear pain have resolved. Reports current right ear pain a 0/10 on pain scale. She feels as though right hearing has almost returned to her normal baseline function. Reported she has been able to hear and communicate well, however, has noticed a slight difficulty listening in background noise. Patient has been consistent with recommended dry ear precautions for the right ear to prevent infection with right TM perforation. She is interested in returning to swimming at her pool and would like to discuss options for swimming if right TM perforation remains present. She denies any recent treatment for ear infections in the past 3 months. Denies symptoms of ear itching, tinnitus, ear drainage, ear fullness/pressure, autophony, dizziness and/or vertigo.     RECALL 11/11/24:   HPI: Fang Carvalho is a 34 y.o. female who presents for a 4 week follow-up evaluation for right otorrhea and right hearing loss. Since last visit on 10/17/24, the patient states that symptoms have improved. Patient has been consistent with course completion of recommended Ciprodex otic drops for right AOE, p.o. augmentin BID x 10 days for right AOM with right TM rupture, and dry ear precautions for right AOE/AOM with significant symptom benefit. Denies any new symptoms of ear pain, ear itching, tinnitus, ear drainage, ear fullness/pressure, autophony, dizziness and/or vertigo.   ASSESSMENT:   Fang Carvalho is a pleasant 34 y.o. female who presents for subsequent  evaluation of right otorrhea and right hearing loss. At our last visit, patient was found to have right AOE/AOM with spontaneous right TM rupture and secondary right conductive hearing loss.The patient endorses that symptoms are improving  since previous visit with completion of Ciprodex otic drops course and p.o. Augmentin course for right AOE/AOM. Based on the clinical information provided, symptoms and clinical exam findings are consistent with right tympanic membrane perforation with resolution of right AOE/AOM. Exam today revealed ongoing central right TM perforation (40%) with no sign of drainage or erythema. I explained to patient that tympanic membrane perforation may likely heal spontaneously over the next couple of months. Reassurance provided to patient that bilateral EAC appears with no sign of acute infection or inflammation and that bilateral TM appears with no sign of infection, effusion, or retraction. Patient offered reassurance and counseling on the current clinical findings and management recommendations.   PLAN:  I recommended patient maintains adherence to dry ear precautions in the right ear.   Follow-up: Patient may schedule for follow-up in 3 months with audiogram prior to visit for surveillance of right TM perforation, sooner as needed. Patient is agreeable to plan. All questions answered to patient's satisfaction.     RECALL 10/17/24:   HPI: Fang Carvalho is a 34 y.o. female who presents for the evaluation of right ear drainage and right hearing loss. The patient states that symptom onset began 6 weeks ago. Describes ear drainage as yellow/white oozing from the right ear. Patient seen by PCP for symptoms on 9/3/24 and received p.o. Augmentin course for AOM with moderate symptom benefit. Patient received additional course of p.o. cefdinir for symptoms on 9/16/24 which provided symptom benefit for ear pain but continued to notice muffled hearing and thick yellow drainage from the right ear.  Patient seen by PCP on 10/8/24 for ongoing ear symptoms and received p.o. doxycyline 100 mg BID x 10 days which patient did not take due to concerns with GI upset. States that she also use tap water to irrigate right ear canal at home. When asked about the presence of left hearing loss, ear pain, ear fullness/pressure, tinnitus, ear itching, left ear drainage, autophony, dizziness or vertigo, she admits to left ear itching. Reports that she was prescribed DermOtic drops by her dermatologist a few months ago but has not used recently since presenting symptom onset. When asked about pertinent otologic history, the patient denies a history of recurrent ear infections, denies history of ear surgery, reports history of PE tube insertion x 1 in early childhood. Patient reports history of prolonged/traumatic loud noise exposure. The patient does not endorse a family history of hearing loss.   ASSESSMENT:   Fang Carvalho is a pleasant 34 y.o. female who presents with symptoms of right otorrhea and right hearing loss. Based on the clinical information provided, symptoms and clinical exam findings are consistent with right AOM with spontaneous TM rupture, right AOE, and bilateral cerumen impaction. I obtained a culture of right EAC otorrhea today. Using appropriate instrumentation, impacted cerumen and copious purulent debris was successfully removed from the EAC on the right, and impacted cerumen was successfully removed from the EAC on the left. Otologic exam today revealed a central/superior right TM perforation (30%) with pulsating purulent drainage coming from the middle ear space. Reassurance provided to patient that exam today showed no evidence of acute infection or inflammation in the left EAC and that left TM appears with no evidence of infection, effusion, retraction or perforation.  Audiogram reviewed in detail with the patient, which revealed mild to moderate conductive hearing loss in and type B tympanogram in  the right ear consistent with perforation and infection and unrestricted hearing in the left ear.   PLAN:  I recommended Ciprodex otic drops with 4 drops into the right ear twice daily for 10 days for right AOE/AOM. Patient was counseled on the indications, possible side effects, and proper administration of medication. Prescription was e-submitted to the patient's preferred pharmacy.   I recommended additional course of p.o. Augmentin (875-125 mg) tablet BID x 10 days for right AOM. Patient was counseled on the indications, possible side effects, and proper administration of medication. Prescription was e-submitted to the patient's preferred pharmacy.   I counseled patient on the following dry ear precautions: Avoid Swimming or hot tubs until ear infection is resolved. If you must swim, please use silicone ear plugs or rubber swim caps and make sure no water gets in the ears. Use cotton ball covered with Vaseline before showering. Apply Vaseline on cotton ball and place it just at the opening of ear canal to create a water seal. Do not push the cotton ball all the way in the canal. After shower, remove the cotton ball and wipe off excess Vaseline using dry clean cloth. Then, use hair dryer on warm or cool air and hold it out 12 inches away from the affected ear and air dry ears for 30 seconds.  Follow-up: Patient may schedule for follow-up in 2-3 weeks to evaluate treatment outcomes. They may follow-up sooner, if needed. Patient is agreeable to this plan, all questions were answered to patient's satisfaction.     PATIENT HISTORY:  Past Medical History:   Diagnosis Date    ADHD (attention deficit hyperactivity disorder) 1/1/2019    Ankylosing spondylitis     Ankylosing spondylitis of site in spine (Multi)     Arthritis 1/1/2002    Bipolar disorder, unspecified (Multi) 01/05/2023    Bipolar 1 disorder    Eczema 3/1/2023    GERD without esophagitis     Hyperthyroidism complicating pregnancy ([facility])     Long term  (current) use of immunosuppressive biologic 12/30/2015    Adalimumab (Humira) long-term use    Osteoarthritis     Personal history of other diseases of the musculoskeletal system and connective tissue 01/05/2023    History of ankylosing spondylitis    Polycystic ovarian syndrome 01/05/2023    PCOS (polycystic ovarian syndrome)    Raynaud's syndrome     Raynaud's syndrome without gangrene     Raynaud's phenomenon without gangrene    Rheumatoid arthritis 1/1/2001    Tendinitis       Past Surgical History:   Procedure Laterality Date    FEMUR FRACTURE SURGERY      FRACTURE SURGERY  4/1/2014    OTHER SURGICAL HISTORY  01/05/2023    Femur fracture repair      Allergies   Allergen Reactions    Biaxin [Clarithromycin] Psychosis        Current Outpatient Medications:     amphetamine-dextroamphetamine XR (Adderall XR) 10 mg 24 hr capsule, Take 1 capsule (10 mg) by mouth once daily in the morning. Do not crush or chew., Disp: , Rfl:     cariprazine (Vraylar) 3 mg capsule, Take 1 capsule (3 mg) by mouth once daily., Disp: , Rfl:     ibuprofen 200 mg tablet, Take 1 tablet (200 mg) by mouth every 6 hours if needed for mild pain (1 - 3)., Disp: , Rfl:     levonorgestrel (Mirena) 21 mcg/24 hours (8 yrs) 52 mg IUD, 52 mg by intrauterine route 1 time. IN 2/12/24 OUT 2/32, Disp: , Rfl:     methylPREDNISolone (Medrol Dospak) 4 mg tablets, Follow schedule on package instructions (Patient taking differently: if needed (pt will take if throat swelling ocurs). Follow schedule on package instructions), Disp: 21 tablet, Rfl: 0    omeprazole (PriLOSEC) 40 mg DR capsule, Take daily before breakfast or 30 minutes before your biggest meal., Disp: 30 capsule, Rfl: 1    pseudoephedrine (Sudafed) 30 mg tablet, Take 1 tablet (30 mg) by mouth every 4 hours if needed for congestion. (Patient not taking: Reported on 1/9/2025), Disp: , Rfl:     Simponi 100 mg/mL syringe, Inject 1 mL (100 mg) under the skin every 30 (thirty) days., Disp: , Rfl:     Tobacco Use: Low Risk  (1/9/2025)    Patient History     Smoking Tobacco Use: Never     Smokeless Tobacco Use: Never     Passive Exposure: Never      Alcohol Use: Not At Risk (1/9/2025)    AUDIT-C     Frequency of Alcohol Consumption: 2-4 times a month     Average Number of Drinks: 1 or 2     Frequency of Binge Drinking: Never      Social History     Substance and Sexual Activity   Drug Use Never        Review of Systems   All other systems negative.     Objective   ENT FOCUSED PHYSICAL EXAM:   Right Ear: External inspection of ear with no deformity, scars, or masses. EAC is partially impacted with cerumen. Unable to visualize tympanic membrane.  Left Ear: External inspection of ear with no deformity, scars, or masses. EAC is partially impacted with cerumen. Unable to visualize tympanic membrane.       EAR CLEANING PROCEDURE NOTE:  Indication: Cerumen impaction  Location: bilateral ear canals  Procedure Note: The procedure was performed by the provider.  Visualization Instrument: A microscope with a #5 speculum was placed in the ear canals to visualize the ear canal debris.  Ear Cleaning Instrument and Outcome: Using the suction, a large amount of soft, yellow cerumen was removed from the impacted EAC(s).  Patient Status: The patient tolerated the procedure well.  Complications: There were no complications.  Post-Procedure/Microscopic Otologic Exam:  Right Ear--EAC is clear. TM with 40% central perforation but no sign of infection, effusion, or retraction.  Left Ear-- EAC is clear. TM is intact with no sign of infection, effusion, or retraction.  No perforation seen. Auto insufflation is visible under microscopy with appropriate TM mobility.    Results:   -I personally reviewed the patient's audiogram from  2/20/25 , which revealed the following results: Right ear with moderate rising to mild conductive hearing loss through 2,000 Hz, with normal peripheral hearing sensitivity through 8,000 Hz. Left ear with normal  hearing across all frequencies. Right ear with Type B tympanogram, and left ear with type C tympanogram. Right ear with 96% WRS at 65 dB, and left ear with 96% WRS at 60 dB. Acoustic reflexes absent right ipsilateral, and present left ipsilateral.  When compared to prior audiogram from 10/17/24, results show stable hearing in the left ear and improvement in conductive hearing loss in the right ear.     Nilda Vega, APRN-CNP

## 2025-02-20 NOTE — PROGRESS NOTES
AUDIOLOGY ADULT AUDIOMETRIC EVALUATION      Name:  Fang Carvalho  :  1990  Age:  34 y.o.  Date of Evaluation: 25    History:  Reason for visit:  Ms. Fang Carvalho was seen today as part of the visit with CYNTHIA Tom for an evaluation of hearing.   Chief Complaint   Patient presents with    Earache    Follow-up      The patient stated she recently experienced a right sided ear infection, which led to a rupture of the tympanic membrane. Reported she has noticed improvement in her symptoms after completed medical. Stated she has felt the hearing has improved slightly since her previous test. Previous hearing testing performed on 10/17/24 indicated essentially normal hearing sensitivity in the left ear and a slight to moderate conductive hearing loss in the right ear.   Reported she has been able to hear and communicate well, however, has noticed some difficulty listening in background noise.   Denied any current otalgia, aural fullness, tinnitus, ear pressure, dizziness/vertigo,  sinus/throat concerns, ear drainage, or sudden hearing loss.  Note previous case history from 10/17/24:  The patient stated she experienced a right sided ear infection near the beginning of September. Stated she has completed a round of antibiotics as well as ear drops and initially noticed some improvement, then mentioned the symptoms returned.   Stated she has noticed some drainage from the right ear, in additional to a sensation of right sided fullness and ear pressure. Mentioned she has experienced a desire to pop the right ear with the inability to do so.   She noticed some slight off-balance sensations with movements, however, stated this has lessened and she denied any significant dizziness or vertigo.   Reported the hearing in the right ear feels muffled and diminished, however, she has still been able to communicate.   Denied any otalgia, tinnitus, ear surgery, recent falls, significant noise exposure,  sinus/throat concerns,  or sudden hearing loss.    EVALUATION     See Audiogram    RESULTS:    Otoscopic Evaluation:   Right Ear: Otoscopy revealed a clear healthy canal and a healthy tympanic membrane with a large perforation near the inferior anterior area was visualized.   Left Ear: Otoscopy revealed a clear healthy canal and a healthy tympanic membrane was visualized.     Immittance:  Immittance Measures: 226 Hz   Right Ear: Tympanometric testing revealed a type B flat tympanogram with no measurable middle ear pressure or static compliance and a large ear canal volume. Results may be consistent with a tympanic membrane perforation.   Left Ear: Tympanometric testing revealed a type C tympanogram with negative (-160  daPa) middle ear pressure and normal static compliance.    Right Ear: Ipsilateral acoustic reflexes were absent at, 500-4,000 Hz. Results are consistent with the test results.   Left Ear: Ipsilateral acoustic reflexes were present at, 500-4,000 Hz, at expected sensation levels.    Test technique:  Pure Tone Audiometry via insert earphones    Reliability:   excellent    Pure Tone Audiometry:    Right Ear: Audiometric testing indicated a moderate rising to mild conductive hearing loss through 2,000 Hz, with normal peripheral hearing sensitivity through 8,000 Hz.   Left Ear:   Audiometric testing indicated normal to near normal peripheral hearing sensitivity from 125-8,000 Hz.   Note conductive components.       Speech Audiometry:   Right Ear:  Speech Reception Threshold (SRT) was obtained at 25 dBHL                  Word Recognition scores were excellent (96%) in quiet when words were presented at 65 dBHL  Left Ear:  Speech Reception Threshold (SRT) was obtained at  20 dBHL                  Word Recognition scores were excellent (96%) in quiet when words were presented at 60 dBHL  Testing was performed with recorded NU-6 speech words in quiet. Speech thresholds were in good agreement with the pure tone  averages in each ear.     IMPRESSIONS:  Today's test results are hearing loss requiring medical/otologic and audiologic follow-up.  The patient was counseled with regard to the findings.    When compared to the previous test results of 10/17/24, note essentially no change in the left ear. Right ear demonstrated improvement in the high frequencies, however, note decrease in the low frequencies due to the conductive component.    RECOMMENDATIONS:  * Continue medical follow up with CYNTHIA Tom.  * Retest as medically indicated, or sooner if a change in hearing sensitivity is noticed.   * Wear hearing protection while in the presence of loud sounds.   * Use effective communication strategies such as asking the speaker to gain attention prior to speaking, speaking in the same room, repeating words that were heard, etc.    PATIENT EDUCATION:   Discussed results and recommendations with the patient and a copy of the hearing test was provided.  Questions were addressed and the patient was encouraged to contact our department should concerns arise.  The patient was seen from 8:30-9:00 am.

## 2025-02-24 ENCOUNTER — TREATMENT (OUTPATIENT)
Dept: PHYSICAL THERAPY | Facility: CLINIC | Age: 35
End: 2025-02-24
Payer: COMMERCIAL

## 2025-02-24 DIAGNOSIS — M22.2X2 PATELLOFEMORAL DISORDER OF BOTH KNEES: ICD-10-CM

## 2025-02-24 DIAGNOSIS — M70.52 PES ANSERINUS BURSITIS OF BOTH KNEES: ICD-10-CM

## 2025-02-24 DIAGNOSIS — M22.2X1 PATELLOFEMORAL DISORDER OF BOTH KNEES: ICD-10-CM

## 2025-02-24 DIAGNOSIS — M70.51 PES ANSERINUS BURSITIS OF BOTH KNEES: ICD-10-CM

## 2025-02-24 PROCEDURE — 97110 THERAPEUTIC EXERCISES: CPT | Mod: GP

## 2025-02-24 PROCEDURE — 97112 NEUROMUSCULAR REEDUCATION: CPT | Mod: GP

## 2025-02-24 NOTE — PROGRESS NOTES
"Physical Therapy    Physical Therapy Treatment    Patient Name: Fang Carvalho  MRN: [id number]  Today's Date: 2/24/2025  Time Calculation  Start Time: 0802  Stop Time: 0847  Time Calculation (min): 45 min        Insurance:  Visit: #4  Authorized: 20  Certification:  2/10/2025 - 2/10/2026   Payor: MIKE / Plan: MIKE [employer]MP / Product Type: *No Product type* /       Subjective:  Patient reports to physical therapy with recent observation of right knee audible popping sound followed by immediate sharp pain (6/10), localized to the knee cap. This began Friday and occurred throughout the weekend that was consistent with the knee remaining in an extended positioning combined with reaching with the UE. This pain did not begin in conjunction with physical therapy session last week, no obvious KATHRYN that could have resulted in discomfort.     She notes that her overall progression in physical therapy has been positive with the biggest change being her tolerance to sitting without needing more frequent changing in positions, such as when sitting for work or on an airplane.         Current pain: 0/10        Objective:  SL Squat (forward) on step   R = large dynamic knee valgus with pain (inferior pole of patella) after first 10 degrees   L = able to perform with minimal anterior knee pain (lateral patella)    Bilateral squat = 25% of full motion with dynamic knee valgus and tibial ER        Treatment:  Therapeutic Exercise  = 2 unit  Hip abduction (GTB) with adductor stretch = 10x each side   Seated foot inversion (GTB) = 15x each side   Sidelying hip abduction (glute med raise) iso = 3x15\" holds   Strap assisted cross body stretch = x60\" holds each side       Neuromuscular Re-education = 1 units  Banded hip ER iso with squat = 5x10\" holds (GTB)   Banded lateral stepping = 3x30\" (GTB)       HEP: #2 (Access Code: [email or web address])  - Half Kneeling Adductor Stretch  - 1 x daily - 7 x weekly - 1 sets - 15 reps  - Supine ITB Stretch with " Strap  - 1 x daily - 7 x weekly - 2 sets - 60 seconds hold  - Seated Hip External Rotation with Resistance  - 1 x daily - 7 x weekly - 1 sets - 25-30 reps  - Sidelying Hip Abduction  - 1 x daily - 7 x weekly - 1 sets - 3 reps - 15  second hold  - Single Leg Bridge  - 1 x daily - 3 x weekly - 2 sets - 5 reps          Diagnosis:  1. Pes anserinus bursitis of both knees    2. Patellofemoral disorder of both knees          Assessment:   Pt demonstrates continued tolerance to interventions in session, introducing her to bilateral squats with banded femoral external rotation with emphasis on proper tibial position based on observation of kinetic chain with single leg squats. Patient responds well to interventions in session without provoking worsening of pain or symptoms. She continues to remain limited in proximal hip strength and ROM, but has already begun to make positive progress in her rehab progression since beginning physical therapy. Patient will continue to benefit from physical therapy services in order to return to prior level of function without compensatory motions during active movements.          Plan:  Continue POC as tolerated  Improve posterolateral hip musculature strength and re-education in open and closed chain positions   Improve hip ER PROM/AROM bilaterally with manual therapy and muscle activation       Axel Santacruz, PT

## 2025-02-28 ENCOUNTER — TELEMEDICINE (OUTPATIENT)
Dept: PRIMARY CARE | Facility: CLINIC | Age: 35
End: 2025-02-28
Payer: COMMERCIAL

## 2025-02-28 DIAGNOSIS — J01.90 ACUTE BACTERIAL SINUSITIS: Primary | ICD-10-CM

## 2025-02-28 DIAGNOSIS — B96.89 ACUTE BACTERIAL SINUSITIS: Primary | ICD-10-CM

## 2025-02-28 PROCEDURE — 99213 OFFICE O/P EST LOW 20 MIN: CPT | Performed by: FAMILY MEDICINE

## 2025-02-28 PROCEDURE — 1036F TOBACCO NON-USER: CPT | Performed by: FAMILY MEDICINE

## 2025-02-28 RX ORDER — AMOXICILLIN AND CLAVULANATE POTASSIUM 875; 125 MG/1; MG/1
875 TABLET, FILM COATED ORAL 2 TIMES DAILY
Qty: 14 TABLET | Refills: 0 | Status: SHIPPED | OUTPATIENT
Start: 2025-02-28 | End: 2025-03-07

## 2025-02-28 ASSESSMENT — ENCOUNTER SYMPTOMS
VOMITING: 0
HEADACHES: 1
STRIDOR: 0
HOARSE VOICE: 1
SHORTNESS OF BREATH: 0
TROUBLE SWALLOWING: 0
ABDOMINAL PAIN: 0
SORE THROAT: 1
COUGH: 0
NECK PAIN: 0
SWOLLEN GLANDS: 0
HEADACHES: 0
DIARRHEA: 0

## 2025-02-28 ASSESSMENT — PATIENT HEALTH QUESTIONNAIRE - PHQ9
2. FEELING DOWN, DEPRESSED OR HOPELESS: NOT AT ALL
1. LITTLE INTEREST OR PLEASURE IN DOING THINGS: NOT AT ALL
SUM OF ALL RESPONSES TO PHQ9 QUESTIONS 1 & 2: 0

## 2025-02-28 NOTE — PROGRESS NOTES
Subjective   Patient ID: Fang Carvalho is a 34 y.o. female who presents for URI and Other (PHQ2-).    Virtual or Telephone Consent    An interactive audio and video telecommunication system which permits real time communications between the patient (at the originating site) and provider (at the distant site) was utilized to provide this telehealth service.   Verbal consent was requested and obtained from Fang Carvalho on this date, 02/28/25 for a telehealth visit and the patient's location was confirmed at the time of the visit.    URI   This is a new problem. Episode onset: 2/16/2025. Associated symptoms include congestion, headaches and a sore throat. Pertinent negatives include no abdominal pain, coughing, diarrhea, ear pain, neck pain, plugged ear sensation, swollen glands or vomiting.   Sore Throat   This is a new problem. The current episode started 1 to 4 weeks ago. The problem has been gradually worsening. Neither side of throat is experiencing more pain than the other. There has been no fever. The fever has been present for Less than 1 day. The pain is at a severity of 0/10. The pain is moderate. Associated symptoms include congestion, headaches and a hoarse voice. Pertinent negatives include no abdominal pain, coughing, diarrhea, drooling, ear discharge, ear pain, plugged ear sensation, neck pain, shortness of breath, stridor, swollen glands, trouble swallowing or vomiting.      She is concerned about a possible sinus infection  Symptoms been present for almost 2 weeks and have been getting worse the past few days  Main symptoms have been sinus pressure above her left eye, as well as nasal drainage which has been dark green in color.  No cough or fever.  She is not having much nasal congestion.  She does have a mild sore throat    Patient Health Questionnaire-2 Score: 0 (2/28/2025  2:37 PM)      Review of Systems   HENT:  Positive for congestion, hoarse voice and sore throat. Negative for drooling, ear  discharge, ear pain and trouble swallowing.    Respiratory:  Negative for cough, shortness of breath and stridor.    Gastrointestinal:  Negative for abdominal pain, diarrhea and vomiting.   Musculoskeletal:  Negative for neck pain.   Neurological:  Positive for headaches.       Objective   There were no vitals taken for this visit.    Physical Exam  Constitutional:       General: She is not in acute distress.     Appearance: Normal appearance. She is well-developed.   Pulmonary:      Effort: Pulmonary effort is normal.   Skin:     Findings: No rash.   Neurological:      Mental Status: She is alert.   Psychiatric:         Mood and Affect: Mood normal.         Behavior: Behavior normal.         Assessment/Plan   Assessment & Plan  Acute bacterial sinusitis    Orders:    amoxicillin-pot clavulanate (Augmentin) 875-125 mg tablet; Take 1 tablet (875 mg) by mouth 2 times a day for 7 days.    This is most likely bacterial based on symptoms and duration.  Will treat with Augmentin twice daily x 7 days.  Recommended calling or follow-up in 5 to 7 days if symptoms or not improving with antibiotic

## 2025-03-03 ENCOUNTER — TREATMENT (OUTPATIENT)
Dept: PHYSICAL THERAPY | Facility: CLINIC | Age: 35
End: 2025-03-03
Payer: COMMERCIAL

## 2025-03-03 DIAGNOSIS — M25.562 CHRONIC PAIN OF BOTH KNEES: Primary | ICD-10-CM

## 2025-03-03 DIAGNOSIS — M22.2X2 PATELLOFEMORAL DISORDER OF BOTH KNEES: ICD-10-CM

## 2025-03-03 DIAGNOSIS — G89.29 CHRONIC PAIN OF BOTH KNEES: Primary | ICD-10-CM

## 2025-03-03 DIAGNOSIS — M70.52 PES ANSERINUS BURSITIS OF BOTH KNEES: ICD-10-CM

## 2025-03-03 DIAGNOSIS — M70.51 PES ANSERINUS BURSITIS OF BOTH KNEES: ICD-10-CM

## 2025-03-03 DIAGNOSIS — M22.2X1 PATELLOFEMORAL DISORDER OF BOTH KNEES: ICD-10-CM

## 2025-03-03 DIAGNOSIS — M25.561 CHRONIC PAIN OF BOTH KNEES: Primary | ICD-10-CM

## 2025-03-03 PROCEDURE — 97112 NEUROMUSCULAR REEDUCATION: CPT | Mod: GP

## 2025-03-03 PROCEDURE — 97110 THERAPEUTIC EXERCISES: CPT | Mod: GP

## 2025-03-03 NOTE — PROGRESS NOTES
"Physical Therapy    Physical Therapy Treatment    Patient Name: Fang Carvalho  MRN: [id number]  Today's Date: 3/3/2025  Time Calculation  Start Time: 0800  Stop Time: 0845  Time Calculation (min): 45 min        Insurance:  Visit: #4  Authorized: 20  Certification:  2/10/2025 - 2/10/2026   Payor: MIKE / Plan: [employer]MP / Product Type: *No Product type* /       Subjective:  Patient reports to physical therapy with overall positive improvement in knee related symptoms since beginning physical therapy, noting no increase in pain with performance of HEP. She reports to therapy today with increased knee stiffness, which may be contributed to increased time sitting due to work over the weekend, not anything specific. HEP has slowly became easier to perform which likely reflects patient's positive improvements.      Current pain:   0/10  L knee (stiffness) = 3/10         Objective:  Hip abduction in half kneeling   Observation of compensatory ipsilateral trunk rotation  Increased challenge maintaining hip abduction lining up the knee over the toes throughout the entirety of exercise         Treatment:  Therapeutic Exercise  = 1 unit  Supine hip external rotation = 2x20 (YTB) each side   Quarter wall sit (GTB) = 2x60\" holds      Neuromuscular Re-education = 2 units  Fire hydrant iso = 2x60\"holds each side (GTB)   Half kneeling adductor stretch with hip abd iso (GTB) = 15x each side       HEP: #2 (Access Code: [email or web address])  - Half Kneeling Adductor Stretch  - 1 x daily - 7 x weekly - 1 sets - 15 reps  - Standing Hip Abduction with Bent Knee  - 1 x daily - 7 x weekly - 2 sets - 60 seconds hold  - Hip Internal and External Rotation Caregiver PROM  - 1 x daily - 7 x weekly - 2 sets - 20 reps  - Wall Quarter Sit  - 1 x daily - 7 x weekly - 2 sets - 60 seconds hold  - Supine ITB Stretch with Strap  - 1 x daily - 7 x weekly - 2 sets - 60 seconds hold          Diagnosis:  1. Chronic pain of both knees    2. Pes anserinus bursitis of " both knees    3. Patellofemoral disorder of both knees            Assessment:   Patient tolerates the introduction of new interventions in session focusing on closed chain hip abduction and loading to the quadriceps and patellar tendon with isometric holds. These interventions do not worsen or reproduce symptoms, require frequent verbal cueing for proper positioning and set up throughout in clinic and for modifications at home. With reduction in symptoms from previous therapy sessions focusing on the proximal hip structures, will continue to slowly incorporate patellar and quadriceps tendon loading programs throughout the remaining sessions for return to prior level of function with minimal to no symptoms. Patient will continue to benefit from physical therapy services in order to return to prior level of function without compensatory motions during active movements.          Plan:  Continue POC as tolerated  Improve posterolateral hip musculature strength and re-education in open and closed chain positions   Improve hip ER PROM/AROM bilaterally with manual therapy and muscle activation       Axel Santacruz, PT

## 2025-03-05 NOTE — PATIENT INSTRUCTIONS
Dry ear Precautions: Avoid swimming or submerging your head underwater in lakes, oceans, hot tubs, pools, bathtubs, etc. If you must swim, please use silicone ear plugs or rubber swim caps and make sure no water gets in the ears. Use a cotton ball covered in a thin layer of Vaseline in the affected ear before showering in order to create a water seal that prevents water from entering the ear canal. Immediately before showering, you may apply a thin layer of Vaseline onto one side of a cotton ball and then gently place the Vaseline-covered side at the opening of the ear canal. Do not push the cotton ball deeply into the ear canal. After showering, you may remove the cotton ball and use a dry clean wash cloth to wipe off any excess Vaseline or moisture at the opening of the ear. You may then take a hair dryer that has either a cool setting or a low-heat setting, hold it out with your arm at least 1-2 feet away from the ear, and aim the air dryer toward the opening of the ear for 30 seconds to air dry any remaining moisture in the ear.

## 2025-03-10 ENCOUNTER — TREATMENT (OUTPATIENT)
Dept: PHYSICAL THERAPY | Facility: CLINIC | Age: 35
End: 2025-03-10
Payer: COMMERCIAL

## 2025-03-10 DIAGNOSIS — M70.52 PES ANSERINUS BURSITIS OF BOTH KNEES: ICD-10-CM

## 2025-03-10 DIAGNOSIS — M22.2X2 PATELLOFEMORAL DISORDER OF BOTH KNEES: ICD-10-CM

## 2025-03-10 DIAGNOSIS — M70.51 PES ANSERINUS BURSITIS OF BOTH KNEES: ICD-10-CM

## 2025-03-10 DIAGNOSIS — M22.2X1 PATELLOFEMORAL DISORDER OF BOTH KNEES: ICD-10-CM

## 2025-03-10 PROCEDURE — 97112 NEUROMUSCULAR REEDUCATION: CPT | Mod: GP

## 2025-03-10 PROCEDURE — 97110 THERAPEUTIC EXERCISES: CPT | Mod: GP

## 2025-03-10 PROCEDURE — 97140 MANUAL THERAPY 1/> REGIONS: CPT | Mod: GP

## 2025-03-10 NOTE — PROGRESS NOTES
"Physical Therapy    Physical Therapy Treatment    Patient Name: Fang Carvalho  MRN: [id number]  Today's Date: 3/10/2025  Time Calculation  Start Time: 0803  Stop Time: 0856  Time Calculation (min): 53 min        Insurance:  Visit: #5  Authorized: 20  Certification:  2/10/2025 - 2/10/2026   Payor: MIKE / Plan: [employer]KE [employer]MP / Product Type: *No Product type* /       Subjective:  Patient reports to physical therapy with recent 7 days of no knee pain, which is the first week since beginning physical therapy without experiencing knee pain bilaterally. She remains compliant with current HEP, noting that none of the exercises worsens her symptoms at this time and she has been able to complete without comments/questions/concerns.     She notes that stair navigation pain has reduced from the initial therapy evaluation to a 1/10 at this time, only disrupting her descending stairs. Ascending no longer reproduces pain.         Current pain:   0/10  L knee (stiffness) = 0/10         Objective:  Hip PROM  ER  L = 60 with muscular end-feel   R = 50 with empty end-feel to knee with OP     Fire hyrant  Compensation with femoral IR due to strength deficit (improved with cueing)     Outcome measure   LEFS = 65/80        Treatment:  Manual Therapy = 1 unit   Long axis distraction   Belt assisted   lateral grade 2 oscillatory mob   MWM into hip ER   STM (pin and stretch) = glute med with passive hip flexion + ext in sidelying     Therapeutic Exercise  = 1 unit  Discussion regarding HEP   Passive hip ER stretch = 2x20\" holds to each side   Leg press isometric (with blue theraband hip abd iso)= 4x45\" holds (290#)      Neuromuscular Re-education = 2 units  Sidelying clamshell = 2x5 with 5 second hold on last rep each side   Half kneeling adductor stretch with hip abd iso (GTB) = 15x each side   Fire hydrant = [id number] side (GTB)   Hip add iso with GTB on stable limb (airex foam pad)    HEP: #2 (Access Code: [email or web address])  - Half Kneeling " Adductor Stretch  - 1 x daily - 7 x weekly - 1 sets - 15 reps  - Standing Hip Abduction with Bent Knee  - 1 x daily - 7 x weekly - 2 sets - 60 seconds hold  - Hip Internal and External Rotation Caregiver PROM  - 1 x daily - 7 x weekly - 2 sets - 20 reps  - Wall Quarter Sit  - 1 x daily - 7 x weekly - 2 sets - 60 seconds hold  - Supine ITB Stretch with Strap  - 1 x daily - 7 x weekly - 2 sets - 60 seconds hold          Diagnosis:  1. Pes anserinus bursitis of both knees    2. Patellofemoral disorder of both knees            Assessment:   Patient tolerates the interventions in session, with use of manual therapy to address hip mobility to further improve tolerance into hip ER to reduce limitations in ROM due to knee pain. She is introduced in session to isometric bilateral leg press with hip abduction isometric to further induce tendon loading to the patella and quadriceps tendon as well as neuromuscular re-education to lateral hip structures in closed chain position. Frequent cueing required when reviewing half kneeling adductor stretch with hip abduction, improving set up and proper performance becoming independent by the end of the session. Based on goals established at initial evaluation, patient has improved significantly in LEFS outcome measure and pain with stair navigation. Patient will continue to benefit from physical therapy services in order to return to prior level of function without compensatory motions during active movements.        Plan:  Continue POC as tolerated  Improve posterolateral hip musculature strength and re-education in open and closed chain positions   Improve hip ER PROM/AROM bilaterally with manual therapy and muscle activation         Goals:  Patient will be independent with HEP.  MET  Patient will report improvement in LEFS outcome measure >55/80 in 4 weeks.   MET (3/10/25)  Patient will report improvement in LEFS outcome measure >64/80 in 8 weeks.   MET (3/10/25)  Patient will  demonstrate at least 4-/5 proximal hip MMT bilaterally in 4 weeks to improve strength of hip and trunk.   50% MET   Patient will demonstrate at least 4/5 proximal hip MMT bilaterally in 8 weeks to improve strength of hip and trunk.   25% MET   Patient will either report or demonstrate being able to ascend/descend a flight of stairs reciprocally with <2/10 pain in bilateral knees in 8 weeks.   MET (3/10/25)  Patient will demonstrate the ability to lift 10# item from the floor to waist height with proper lifting mechanics and <3/10 pain in 4 weeks for return to prior level of function.  Patient will demonstrate the ability to lift 20# item from the floor to waist height with proper lifting mechanics and without pain in  8 weeks.           Axel Santacruz, PT

## 2025-03-17 ENCOUNTER — TREATMENT (OUTPATIENT)
Dept: PHYSICAL THERAPY | Facility: CLINIC | Age: 35
End: 2025-03-17
Payer: COMMERCIAL

## 2025-03-17 DIAGNOSIS — M70.52 PES ANSERINUS BURSITIS OF BOTH KNEES: ICD-10-CM

## 2025-03-17 DIAGNOSIS — M70.51 PES ANSERINUS BURSITIS OF BOTH KNEES: ICD-10-CM

## 2025-03-17 DIAGNOSIS — M22.2X1 PATELLOFEMORAL DISORDER OF BOTH KNEES: ICD-10-CM

## 2025-03-17 DIAGNOSIS — M22.2X2 PATELLOFEMORAL DISORDER OF BOTH KNEES: ICD-10-CM

## 2025-03-17 PROCEDURE — 97112 NEUROMUSCULAR REEDUCATION: CPT | Mod: GP

## 2025-03-17 PROCEDURE — 97140 MANUAL THERAPY 1/> REGIONS: CPT | Mod: GP

## 2025-03-17 PROCEDURE — 97110 THERAPEUTIC EXERCISES: CPT | Mod: GP

## 2025-03-17 NOTE — PROGRESS NOTES
"Physical Therapy    Physical Therapy Treatment    Patient Name: Fang Carvalho  MRN: [id number]  Today's Date: 3/17/2025  Time Calculation  Start Time: 0800  Stop Time: 0845  Time Calculation (min): 45 min        Insurance:  Visit: #6  Authorized: 20  Certification:  2/10/2025 - 2/10/2026   Payor: MIKE / Plan: MIKE [employer]MP / Product Type: *No Product type* /       Subjective:  Patient reports to physical therapy with no worsening in knee symptoms since the previous session, denying any soreness or residual symptoms the following day/s. She has no comments, questions, or concerns at this time regarding her current HEP, being able to perform 7x/week without worsening symptoms.       Current pain:   0/10  L knee (stiffness) = 0/10         Objective:  Hip ROM  ER (seated)  L = 38   R = 25     Glute med MMT  L = 4/5   R = 3+/5     Fire hyrant  Compensation with femoral IR due to strength deficit (improved with cueing)         Treatment:  Manual Therapy = 1 unit   Long axis distraction   Belt assisted   lateral grade 2 oscillatory mob   MWM into hip ER   STM (pin and stretch) = glute med with active clamshell in sidelying     Therapeutic Exercise  = 1 unit  Half kneeling adductor stretch with hip abd iso (GTB) = 15x each side   Passive hip ER stretch = 2x20\" holds to each side   Quarter wall sit with banded hip abd (GTB) = 3x60\" holds     Neuromuscular Re-education = 1 units  Fire hydrant = [id number] side (GTB) standing on airex foam pad  Lateral side stepping with cable column = 2x3 each side (15#)        HEP: #2 (Access Code: [email or web address])  - Half Kneeling Adductor Stretch  - 1 x daily - 7 x weekly - 1 sets - 15 reps  - Standing Hip Abduction with Bent Knee  - 1 x daily - 7 x weekly - 2 sets - 60 seconds hold  - Hip Internal and External Rotation Caregiver PROM  - 1 x daily - 7 x weekly - 2 sets - 20 reps  - Wall Quarter Sit  - 1 x daily - 7 x weekly - 2 sets - 60 seconds hold  - Supine ITB Stretch with Strap  - 1 x daily - 7 " x weekly - 2 sets - 60 seconds hold          Diagnosis:  1. Pes anserinus bursitis of both knees    2. Patellofemoral disorder of both knees            Assessment:   Patient continues to struggle with tolerance into hip ER on the RLE due to increased limitations in joint mobility, muscle flexibility, and soft tissue extensibility which places more stress on the knee joints (tibiofemoral and patellofemoral). Using joint mobilization to the hip helps to eliminate all knee pain when passively moving into hip ER, as well as, passively holding the foot on into inversion on the ipsilateral side. Patient tolerates interventions in session, introducing lateral stepping with cable column being able to perform with adequate control and stability at the hip and knee throughout. With 2 remaining scheduled appointments, patient will likely discharge in the next 2 weeks as long as symptoms continue to remain minimal to none throughout performance of HEP and ADLs.     Plan:  Continue POC as tolerated  Improve posterolateral hip musculature strength and re-education in open and closed chain positions   Improve hip ER PROM/AROM bilaterally with manual therapy and muscle activation         Goals:  Patient will be independent with HEP.  MET  Patient will report improvement in LEFS outcome measure >55/80 in 4 weeks.   MET (3/10/25)  Patient will report improvement in LEFS outcome measure >64/80 in 8 weeks.   MET (3/10/25)  Patient will demonstrate at least 4-/5 proximal hip MMT bilaterally in 4 weeks to improve strength of hip and trunk.   50% MET   Patient will demonstrate at least 4/5 proximal hip MMT bilaterally in 8 weeks to improve strength of hip and trunk.   25% MET   Patient will either report or demonstrate being able to ascend/descend a flight of stairs reciprocally with <2/10 pain in bilateral knees in 8 weeks.   MET (3/10/25)  Patient will demonstrate the ability to lift 10# item from the floor to waist height with proper  lifting mechanics and <3/10 pain in 4 weeks for return to prior level of function.  Patient will demonstrate the ability to lift 20# item from the floor to waist height with proper lifting mechanics and without pain in  8 weeks.           Axel Santacruz, PT

## 2025-03-20 DIAGNOSIS — K21.9 LARYNGOPHARYNGEAL REFLUX (LPR): ICD-10-CM

## 2025-03-20 RX ORDER — OMEPRAZOLE 40 MG/1
CAPSULE, DELAYED RELEASE ORAL
Qty: 30 CAPSULE | Refills: 1 | Status: SHIPPED | OUTPATIENT
Start: 2025-03-20

## 2025-03-21 ENCOUNTER — CLINICAL SUPPORT (OUTPATIENT)
Dept: AUDIOLOGY | Facility: CLINIC | Age: 35
End: 2025-03-21

## 2025-03-21 DIAGNOSIS — H72.91 PERFORATION OF RIGHT TYMPANIC MEMBRANE: ICD-10-CM

## 2025-03-21 DIAGNOSIS — H90.11 CONDUCTIVE HEARING LOSS OF RIGHT EAR WITH UNRESTRICTED HEARING OF LEFT EAR: Primary | ICD-10-CM

## 2025-03-21 PROCEDURE — V5267 HEARING AID SUP/ACCESS/DEV: HCPCS | Performed by: AUDIOLOGIST

## 2025-03-21 NOTE — PROGRESS NOTES
EARMOLD IMPRESSION   HISTORY  Fang Carvalho was seen today for an Ear Mold Impression. She has a right tympanic membrane perforation.     Ear mold impression: Swim mold impression    Color: lavender   Style: swim mold  Material: TFC    date estimation: 3 weeks    Otoscopy: Otoscopy indicated clear ear canals and visualization of the tympanic membrane bilaterally. Right TM perforation noted    Ear mold impression was taken without incidence bilaterally. Paid in full today.    Warranty: Patient was informed of the 90 day no charge re-make policy.     Time Stamp: [id number]  Completed by  JOSE LUIS Bradshaw.  Audiology Extern    Under the supervision of  Fay Florez, CCC-A  Licensed Senior Audiologist

## 2025-03-24 ENCOUNTER — TREATMENT (OUTPATIENT)
Dept: PHYSICAL THERAPY | Facility: CLINIC | Age: 35
End: 2025-03-24
Payer: COMMERCIAL

## 2025-03-24 DIAGNOSIS — M22.2X2 PATELLOFEMORAL DISORDER OF BOTH KNEES: ICD-10-CM

## 2025-03-24 DIAGNOSIS — M70.52 PES ANSERINUS BURSITIS OF BOTH KNEES: ICD-10-CM

## 2025-03-24 DIAGNOSIS — K21.9 LARYNGOPHARYNGEAL REFLUX (LPR): ICD-10-CM

## 2025-03-24 DIAGNOSIS — M70.51 PES ANSERINUS BURSITIS OF BOTH KNEES: ICD-10-CM

## 2025-03-24 DIAGNOSIS — M22.2X1 PATELLOFEMORAL DISORDER OF BOTH KNEES: ICD-10-CM

## 2025-03-24 PROCEDURE — 97112 NEUROMUSCULAR REEDUCATION: CPT | Mod: GP

## 2025-03-24 PROCEDURE — 97110 THERAPEUTIC EXERCISES: CPT | Mod: GP

## 2025-03-24 PROCEDURE — 97140 MANUAL THERAPY 1/> REGIONS: CPT | Mod: GP

## 2025-03-24 RX ORDER — OMEPRAZOLE 40 MG/1
CAPSULE, DELAYED RELEASE ORAL
Qty: 30 CAPSULE | Refills: 0 | Status: SHIPPED | OUTPATIENT
Start: 2025-03-24 | End: 2025-03-25 | Stop reason: SDUPTHER

## 2025-03-24 NOTE — PROGRESS NOTES
"Physical Therapy    Physical Therapy Treatment    Patient Name: Fang Carvalho  MRN: [id number]  Today's Date: 3/24/2025  Time Calculation  Start Time: 0806  Stop Time: 0850  Time Calculation (min): 44 min        Insurance:  Visit: #7  Authorized: 20  Certification:  2/10/2025 - 2/10/2026   Payor: MKIE / Plan: MIKE [employer]MP / Product Type: *No Product type* /       Subjective:  Patient reports to physical therapy with continued positive relief of symptoms since beginning physical therapy. She only experiences anterior knee pain (\"more stiff than painful\") when descending stairs, but other previous activities that would cause pain no longer create additional pain. If you negate pain with stairs, patient no longer experiences pain in any other circumstances, which has been encouraging to this point to return to prior level of function.       Current pain:   0/10  L knee (stiffness) = 0/10         Objective:  Outcome Measure (LEFS)  (2/05) = 46/80  (3/10) = 65/80  (3/24) = 61/80        Treatment:  Manual Therapy = 1 unit   Long axis distraction   Belt assisted   lateral grade 2 oscillatory mob   MWM into hip ER     Therapeutic Exercise  = 1 unit  Half kneeling adductor stretch with hip abd iso (GTB) = 1x15x5\" holds each side   Discussion regarding POC and updated HEP parameters     Neuromuscular Re-education = 1 units  Band hip abd (around ankles) with front tap on step (8 inch) = 2x10 YTB  Sumo stance squat= 2x15 (GTB around knees)        HEP: #3 (Access Code: [email or web address])  - Half Kneeling Adductor Stretch  - 1 x daily - 7 x weekly - 1 sets - 15 reps - 3-5 seconds hold  - Squat in Wide Stance with External Rotation  - 1 x daily - 7 x weekly - 2 sets - 15 reps  - Supine ITB Stretch with Strap  - 1 x daily - 7 x weekly - 2 sets - 60 seconds hold  - Forward Step Down Touch with Heel  - 1 x daily - 3-4 x weekly - 2 sets - 10 reps  - Hip Internal and External Rotation Caregiver PROM  - 1 x daily - 3-4 x weekly - 2 sets - 20 " reps          Diagnosis:  1. Pes anserinus bursitis of both knees    2. Patellofemoral disorder of both knees            Assessment:   Patient continues to have positive improvements in bilateral knee pain since beginning physical therapy, reporting near full elimination of symptoms. Introduced to patient was front taps on an elevated step with hip abduction to further promote tolerance to stair navigation, which did not provoke worsening of symptoms throughout session. She is provided with an updated HEP to reflect modifications discussed in session. Next scheduled session will plan on conducting re-evaluation of previous impairments with discussion for possible discharge.     Plan:  Continue POC as tolerated  Improve posterolateral hip musculature strength and re-education in open and closed chain positions   Improve hip ER PROM/AROM bilaterally with manual therapy and muscle activation         Goals:  Patient will be independent with HEP.  MET  Patient will report improvement in LEFS outcome measure >55/80 in 4 weeks.   MET (3/10/25)  Patient will report improvement in LEFS outcome measure >64/80 in 8 weeks.   MET (3/10/25)  Patient will demonstrate at least 4-/5 proximal hip MMT bilaterally in 4 weeks to improve strength of hip and trunk.   60% MET   Patient will demonstrate at least 4/5 proximal hip MMT bilaterally in 8 weeks to improve strength of hip and trunk.   30% MET   Patient will either report or demonstrate being able to ascend/descend a flight of stairs reciprocally with <2/10 pain in bilateral knees in 8 weeks.   MET (3/10/25)  Patient will demonstrate the ability to lift 10# item from the floor to waist height with proper lifting mechanics and <3/10 pain in 4 weeks for return to prior level of function.  Patient will demonstrate the ability to lift 20# item from the floor to waist height with proper lifting mechanics and without pain in  8 weeks.           Axel Santacruz, PT

## 2025-03-25 ENCOUNTER — APPOINTMENT (OUTPATIENT)
Facility: CLINIC | Age: 35
End: 2025-03-25
Payer: COMMERCIAL

## 2025-03-25 VITALS
HEART RATE: 80 BPM | SYSTOLIC BLOOD PRESSURE: 117 MMHG | DIASTOLIC BLOOD PRESSURE: 76 MMHG | HEIGHT: 64 IN | WEIGHT: 175 LBS | BODY MASS INDEX: 29.88 KG/M2

## 2025-03-25 DIAGNOSIS — K21.9 LARYNGOPHARYNGEAL REFLUX (LPR): Primary | ICD-10-CM

## 2025-03-25 RX ORDER — OMEPRAZOLE 40 MG/1
CAPSULE, DELAYED RELEASE ORAL
Qty: 30 CAPSULE | Refills: 11 | Status: SHIPPED | OUTPATIENT
Start: 2025-03-25

## 2025-03-25 NOTE — PROGRESS NOTES
Patient ID: Fang Carvalho is a 34 y.o. female who presents for the evaluation of throat.     PROVIDER IMPRESSIONS:  DIAGNOSES/PROBLEMS:  -Laryngopharyngeal reflux (LPR)    ASSESSMENT:   Fang Carvalho is a pleasant 34 y.o. female who presents for subsequent evaluation of chronic acquired dysphonia, right-sided throat pain, and globus sensation. Patient endorses significant symptom improvement with PPI therapy. Based on the clinical information provided, symptoms and clinical exam findings are consistent with well-controlled LPR. Flexible laryngoscopy exam performed today with a brief view of larynx that showed minimal evidence of reflux-related changes as detailed below. Patient offered reassurance and counseling regarding the findings today.    PLAN:  I counseled the patient extensively on dietary and lifestyle modifications to improve LPR. I recommended limiting or avoiding consumption of spicy, fatty, caffeinated, carbonated, acidic, and citrus foods or beverages that may aggravate reflux. I also recommended limiting or avoiding consumption of chocolate, peppermint, and alcohol.  I recommended eating smaller, more frequent meals and to avoid eating or drinking 2-3 hours before lying down. The patient was also apprised that elevating the head of the bed may help. The patient was counseled on avoiding NSAIDS as much as possible, and if taking them be sure to do so with a meal.   I recommended continuing PPI therapy with  p.o. omeprazole 40 mg DR capsule daily for LPR. Patient was counseled on the indications, possible side effects, and proper administration of medication. Prescription refill was e-submitted to the patient's preferred pharmacy (30 capsules x 11 refills).  Follow-up: Patient may schedule for follow-up in 1 year for routine annual surveillance of PPI therapy treatment outcomes. She may follow-up sooner if needed. Patient is agreeable to this plan, all questions were answered to patient's  satisfaction.     Subjective   HPI: Fang Carvalho is a 34 y.o. female who presents for subsequent evaluation of throat. She was previously evaluate by my ENT partner Fang Hoff CNP on 1/9/25 for throat symptoms and received p.o. omeprazole 40 mg daily to treat LPR. Patient has been consistent with PPI therapy and reports significant improvement in symptoms of chronic hoarseness, right-sided throat pain, and globus sensation. States that voice hoarseness symptoms are improved by 95% over the past 2 months. She rates current throat pain a 0/10 on numeric pain scale. She has noticed a decrease in frequency of heartburn/acid reflux. She notes she is able to sing to her daughter now because of improved voice hoarseness. Denies the presence of hemoptysis, dysphagia, odynophagia, dyspnea/SOB, cough or bad breath.     PATIENT HISTORY:  Past Medical History:   Diagnosis Date    ADHD (attention deficit hyperactivity disorder) 1/1/2019    Ankylosing spondylitis     Ankylosing spondylitis of site in spine (Multi)     Arthritis 1/1/2002    Bipolar disorder, unspecified (Multi) 01/05/2023    Bipolar 1 disorder    Eczema 3/1/2023    GERD without esophagitis     Hyperthyroidism complicating pregnancy ([facility])     Long term (current) use of immunosuppressive biologic 12/30/2015    Adalimumab (Humira) long-term use    Osteoarthritis     Personal history of other diseases of the musculoskeletal system and connective tissue 01/05/2023    History of ankylosing spondylitis    Polycystic ovarian syndrome 01/05/2023    PCOS (polycystic ovarian syndrome)    Raynaud's syndrome     Raynaud's syndrome without gangrene     Raynaud's phenomenon without gangrene    Rheumatoid arthritis 1/1/2001    Tendinitis       Past Surgical History:   Procedure Laterality Date    FEMUR FRACTURE SURGERY      FRACTURE SURGERY  4/1/2014    OTHER SURGICAL HISTORY  01/05/2023    Femur fracture repair      Allergies   Allergen Reactions    Biaxin  [Clarithromycin] Psychosis        Current Outpatient Medications:     amphetamine-dextroamphetamine XR (Adderall XR) 10 mg 24 hr capsule, Take 1 capsule (10 mg) by mouth once daily in the morning. Do not crush or chew., Disp: , Rfl:     cariprazine (Vraylar) 3 mg capsule, Take 1 capsule (3 mg) by mouth once daily., Disp: , Rfl:     ibuprofen 200 mg tablet, Take 1 tablet (200 mg) by mouth every 6 hours if needed for mild pain (1 - 3)., Disp: , Rfl:     levonorgestrel (Mirena) 21 mcg/24 hours (8 yrs) 52 mg IUD, 52 mg by intrauterine route 1 time. IN 2/12/24 OUT 2/32, Disp: , Rfl:     methylPREDNISolone (Medrol Dospak) 4 mg tablets, Follow schedule on package instructions (Patient taking differently: if needed (pt will take if throat swelling ocurs). Follow schedule on package instructions), Disp: 21 tablet, Rfl: 0    omeprazole (PriLOSEC) 40 mg DR capsule, Take daily before breakfast or 30 minutes before your biggest meal., Disp: 30 capsule, Rfl: 0    Simponi 100 mg/mL syringe, Inject 1 mL (100 mg) under the skin every 30 (thirty) days., Disp: , Rfl:    Tobacco Use: Low Risk  (3/4/2025)    Patient History     Smoking Tobacco Use: Never     Smokeless Tobacco Use: Never     Passive Exposure: Never      Alcohol Use: Not At Risk (1/9/2025)    AUDIT-C     Frequency of Alcohol Consumption: 2-4 times a month     Average Number of Drinks: 1 or 2     Frequency of Binge Drinking: Never      Social History     Substance and Sexual Activity   Drug Use Never        Review of Systems   All other systems negative.     Objective   Visit Vitals  OB Status Implant   Smoking Status Never        PHYSICAL EXAM:  General appearance: Appears well, well-nourished, well groomed. No acute distress.   Constitutional: No fever, chills, weight loss or weight gain.  Communication: Normal communication  Psychiatric: Oriented to person, place and time. Normal mood and affect.  Neurologic: Cranial nerves II-XII grossly intact and symmetric  bilaterally.  Cardiovascular: Examination of peripheral vascular system shows no clubbing or cyanosis.  Respiratory: No respiratory distress increased work of breathing. Inspection of the chest with symmetric chest expansion and normal respiratory effort.  Skin: No head and neck rashes.  Head: Normocephalic. Atraumatic with no masses, lesions or scarring.  Face: Normal symmetry. No scars or deformities.  Eyes: Conjunctiva not edematous or erythematous. PERRLA  Neck: Supple and symmetric, trachea midline. Lymph nodes with no adenopathy.  Head: Normocephalic. Atraumatic with no masses, lesions or scarring.  Eyes: PERRL, EOMI, Conjunctiva is clear. No nystagmus.  Nose: External inspection of nose: No nasal lesions, lacerations or scars. No tenderness on frontal or maxillary sinus palpation. Anterior rhinoscopy with limited visualization past the inferior turbinates: Septum is midline.  No septal perforation or lesions. No septal hematoma/ seroma.  No signs of bleeding.  No evidence of intranasal polyps.  Inferior turbinates are [].    Throat:  Floor of mouth is clear, no masses.  Tongue appears normal, no lesions or masses. Gums, gingiva, buccal mucosa appear pink and moist, no lesions. Teeth are in intact.  No obvious dental infections.  Peritonsillar regions appear symmetric without swelling. Hard and soft palate appear normal, no obvious cleft. Uvula is midline.  Left Tonsil -- 2+, no exudates.  Right Tonsil -- 2+, no exudates.  Oropharynx: No lesions. Retropharyngeal wall is flat.  No postnasal drip.  Salivary Glands: Symmetric bilaterally.  No palpable masses.  No evidence of acute infection or salivary stones.  TMJ: Normal, no trismus.  Right Ear: External inspection of ear with no deformity, scars, or masses. Mastoid is nontender. EAC is clear. TM with 40% central perforation but no sign of infection, effusion, or retraction.   Left Ear: External inspection of ear with no deformity, scars, or masses. Mastoid is  nontender. EAC is clear. TM is intact with no sign of infection, effusion, or retraction. No perforation seen.     FLEXIBLE LARYNGOSCOPY/NASOPHARYNGOSCOPY PROCEDURE NOTE:   INDICATION: concern for LPR/throat follow-up evaluation  Due to a strong gag and inadequate visualization by mirror exam, flexible naso-laryngoscopy was performed.  Risks, benefits, alternatives, and expectations were discussed with patient and/or guardian prior to procedure and verbal consent to proceed was obtained.  Both nostrils were sprayed with a mixture of lidocaine 4% and topical nasal decongestant.  After a sufficient amount of time elapsed for mucosal anesthesia to take place, the flexible naso-laryngoscope was advanced into the nostril.  The following areas were visualized: Nasal passage, nasal septum, turbinates’, nasopharynx, adenoids, oropharynx, eustachian tube orifices and torus tubarius, lateral pharyngeal walls, soft palate, base of tongue, valleculae, epiglottis, laryngeal complex, vocal cords, arytenoids, subglottic and pyriform sinuses visualized and examined for pathology.    The patient tolerated the procedure well and these structures were found to be normal except as follows: minimal postcricoid edema, mild Interarytenoid erythema, minimal/diffuse laryngeal edema, mild Laryngeal pachyderma  Normal findings: Symmetric TVC movement with normal mobility on phonation bilaterally. Complete glottic closure on phonation. No sign of TVC nodules/granulomas. The visualized laryngeal structures showed no sign of masses, purulence, bleeding, ulceration or lesions. No sign of pyriform sinus mucus accumulation.         Nilda Vega, APRN-CNP

## 2025-03-31 ENCOUNTER — APPOINTMENT (OUTPATIENT)
Dept: RHEUMATOLOGY | Facility: CLINIC | Age: 35
End: 2025-03-31
Payer: COMMERCIAL

## 2025-03-31 ENCOUNTER — TREATMENT (OUTPATIENT)
Dept: PHYSICAL THERAPY | Facility: CLINIC | Age: 35
End: 2025-03-31
Payer: COMMERCIAL

## 2025-03-31 VITALS
SYSTOLIC BLOOD PRESSURE: 128 MMHG | BODY MASS INDEX: 29.01 KG/M2 | HEART RATE: 79 BPM | DIASTOLIC BLOOD PRESSURE: 86 MMHG | WEIGHT: 169 LBS | TEMPERATURE: 98.5 F

## 2025-03-31 DIAGNOSIS — M22.2X1 PATELLOFEMORAL DISORDER OF BOTH KNEES: ICD-10-CM

## 2025-03-31 DIAGNOSIS — M70.51 PES ANSERINUS BURSITIS OF BOTH KNEES: ICD-10-CM

## 2025-03-31 DIAGNOSIS — M35.01 SJOGREN'S SYNDROME WITH KERATOCONJUNCTIVITIS SICCA: Primary | ICD-10-CM

## 2025-03-31 DIAGNOSIS — M70.52 PES ANSERINUS BURSITIS OF BOTH KNEES: ICD-10-CM

## 2025-03-31 DIAGNOSIS — M17.10 ARTHRITIS OF KNEE: ICD-10-CM

## 2025-03-31 DIAGNOSIS — M22.2X2 PATELLOFEMORAL DISORDER OF BOTH KNEES: ICD-10-CM

## 2025-03-31 PROCEDURE — 97110 THERAPEUTIC EXERCISES: CPT | Mod: GP

## 2025-03-31 PROCEDURE — 97112 NEUROMUSCULAR REEDUCATION: CPT | Mod: GP

## 2025-03-31 PROCEDURE — 99214 OFFICE O/P EST MOD 30 MIN: CPT | Performed by: INTERNAL MEDICINE

## 2025-03-31 PROCEDURE — 1036F TOBACCO NON-USER: CPT | Performed by: INTERNAL MEDICINE

## 2025-03-31 RX ORDER — FLUOCINOLONE ACETONIDE 0.11 MG/ML
1-2 OIL AURICULAR (OTIC) EVERY 12 HOURS PRN
COMMUNITY
Start: 2025-03-20

## 2025-03-31 RX ORDER — HYDROCORTISONE 25 MG/G
OINTMENT TOPICAL
COMMUNITY
Start: 2025-03-20

## 2025-03-31 ASSESSMENT — PATIENT HEALTH QUESTIONNAIRE - PHQ9
1. LITTLE INTEREST OR PLEASURE IN DOING THINGS: NOT AT ALL
SUM OF ALL RESPONSES TO PHQ9 QUESTIONS 1 & 2: 0
2. FEELING DOWN, DEPRESSED OR HOPELESS: NOT AT ALL

## 2025-03-31 NOTE — PROGRESS NOTES
"Physical Therapy    Physical Therapy Treatment    Patient Name: Fang Carvalho  MRN: [id number]  Today's Date: 3/31/2025  Time Calculation  Start Time: 0800  Stop Time: 0848  Time Calculation (min): 48 min        Insurance:  Visit: #7  Authorized: 20  Certification:  2/10/2025 - 2/10/2026   Payor: MIKE / Plan: MIKE [employer]MP / Product Type: *No Product type* /       Subjective:  Patient reports to physical therapy with continued positive relief of symptoms since beginning physical therapy. Patient notes that specific movements and positions that would previously result in pain no longer recreate pain at this time (extending the legs out completely straight and sitting for prolonged periods of time).     What things continue to cause symptoms (discomfort > pain) are coming down the stairs, more on the right knee without pain to the left knee. She notes that the right knee has a sensation of pulling to the front.       Current pain:   0/10  L knee (stiffness) = 0/10         Objective:  Outcome Measure (LEFS)  (2/05) = 46/80  (3/10) = 65/80  (3/24) = 61/80  (3/31) = 61/80    Lateral step down   Mod-severe dynamic knee valgus worse on the RLE   Required frequent cueing for proper hip and knee positioning         Treatment:  Manual Therapy = 0 unit   Long axis distraction   Belt assisted   lateral grade 2 oscillatory mob   MWM into hip ER     Therapeutic Exercise  = 2 unit  Prone hip flexor stretch = 3x30\" holds   Fire hydrant on step (6 inch) with lateral step down = 2x12 YTB  Sumo stance squat (10#)  = 3x12   Patient education   HEP dosing and parameters     NMR = 1 unit  Fire hydrant on step (6 inch) with lateral step down = 2x12 YTB  Seated hip abduction with foot inversion (Blue theraband) = 1x15         HEP: #3 (Access Code: [email or web address])  - Prone Quadriceps Stretch with Strap  - 1 x daily - 7 x weekly - 3 sets - 30 seconds hold  - Half Kneeling Adductor Stretch  - 1 x daily - 7 x weekly - 1 sets - 15 reps - 3-5 seconds " hold  - Supine ITB Stretch with Strap  - 1 x daily - 7 x weekly - 2 sets - 60 seconds hold  - Fire Hydrant Partial Squat  - 1 x daily - 3-4 x weekly - 2 sets - 12-15 reps  - Squat in Wide Stance with External Rotation  - 1 x daily - 3-4 x weekly - 2 sets - 12-15 reps          Diagnosis:  1. Pes anserinus bursitis of both knees    2. Patellofemoral disorder of both knees            Assessment:   Patient continues to have positive improvements in bilateral knee pain since beginning physical therapy, reporting near full elimination of symptoms. Patient was provided an updated HEP in session to reflect modifications made as a result of continued symptoms to the anterior knee with eccentric loading during stair navigation at home. Patient's follow ups will aim to address kinetic chain deficiencies above and below the tibiofemoral joint, today introducing intervention to target the ankle into inversion. Patient will continue to benefit from physical therapy services in order to return to prior level of function without compensatory motions during active movements.           Plan:  Continue POC as tolerated  Improve posterolateral hip musculature strength and re-education in open and closed chain positions   Improve hip ER PROM/AROM bilaterally with manual therapy and muscle activation         Goals:  Patient will be independent with HEP.  MET  Patient will report improvement in LEFS outcome measure >55/80 in 4 weeks.   MET (3/10/25)  Patient will report improvement in LEFS outcome measure >64/80 in 8 weeks.   MET (3/10/25)  Patient will demonstrate at least 4-/5 proximal hip MMT bilaterally in 4 weeks to improve strength of hip and trunk.   60% MET   Patient will demonstrate at least 4/5 proximal hip MMT bilaterally in 8 weeks to improve strength of hip and trunk.   30% MET   Patient will either report or demonstrate being able to ascend/descend a flight of stairs reciprocally with <2/10 pain in bilateral knees in 8  weeks.   MET (3/10/25)  Patient will demonstrate the ability to descend 6 inch steps without experiencing anterior knee pain in order to perform stair navigation at home and in the community.   25% met           Axel Santacruz, PT

## 2025-03-31 NOTE — PROGRESS NOTES
Chart reviewed   Hx of RENE followed by Dr. Edmonds years ago and more recently dx and tx for AS.   She was on MTX, humira and then Orencia in the past  She is currently on Simponi for the last 4 years and doing well for AS (+IBP HLA B27+)  Interestingly she has had some skin manifestations angular chelitis, dermatitis / psoriasform post ear area  She currently works as a   Hx of bipolar       CHIEF COMPLAINT: Follow up of RENE    HPI: At today's visit, the patient reports having a rash and saw Derm who diagnosed her dermatis. She had a biopsy in the past which was inconclusive for psoriasis.  Had MRI of the the SI joints which was negative for sacroillitis.  She has been going to PT with improvement in knee pain.   No joint swelling.   She is still continuing Simponi for RENE.       Patient Active Problem List   Diagnosis    Ankylosing spondylitis    Anxiety disorder    Bipolar II disorder, severe, depressed, with mixed features, in full remission (Multi)    Gastroesophageal reflux disease without esophagitis    HLA B27 positive    High risk pregnancy, antepartum (HHS-HCC)    PCOS (polycystic ovarian syndrome)    Polyarticular juvenile idiopathic arthritis (Multi)    Vitamin D deficiency, unspecified    Rubella non-immune status, antepartum (HHS-HCC)    Chronic low back pain    Routine postpartum follow-up    Encounter for counseling regarding contraception    IUD (intrauterine device) in place    Fatigue         Past Medical History:   Diagnosis Date    ADHD (attention deficit hyperactivity disorder) 1/1/2019    Ankylosing spondylitis     Ankylosing spondylitis of site in spine (Multi)     Arthritis 1/1/2002    Bipolar disorder, unspecified (Multi) 01/05/2023    Bipolar 1 disorder    Eczema 3/1/2023    GERD without esophagitis     Hyperthyroidism complicating pregnancy (HHS-HCC)     Long term (current) use of immunosuppressive biologic 12/30/2015    Adalimumab (Humira) long-term use    Osteoarthritis      Personal history of other diseases of the musculoskeletal system and connective tissue 01/05/2023    History of ankylosing spondylitis    Polycystic ovarian syndrome 01/05/2023    PCOS (polycystic ovarian syndrome)    Raynaud's syndrome     Raynaud's syndrome without gangrene     Raynaud's phenomenon without gangrene    Rheumatoid arthritis 1/1/2001    Tendinitis             Past Surgical History:   Procedure Laterality Date    FEMUR FRACTURE SURGERY      FRACTURE SURGERY  4/1/2014    OTHER SURGICAL HISTORY  01/05/2023    Femur fracture repair       Allergies   Allergen Reactions    Biaxin [Clarithromycin] Psychosis       Medication Documentation Review Audit       Reviewed by Bety Ayers MA (Medical Assistant) on 03/25/25 at 0906      Medication Order Taking? Sig Documenting Provider Last Dose Status   amphetamine-dextroamphetamine XR (Adderall XR) 10 mg 24 hr capsule [id number] Yes Take 1 capsule (10 mg) by mouth once daily in the morning. Do not crush or chew. Historical Provider, MD  Active   cariprazine (Vraylar) 3 mg capsule [id number] Yes Take 1 capsule (3 mg) by mouth once daily. Historical Provider, MD Taking Active   ibuprofen 200 mg tablet [id number] Yes Take 1 tablet (200 mg) by mouth every 6 hours if needed for mild pain (1 - 3). Historical Provider, MD  Active   levonorgestrel (Mirena) 21 mcg/24 hours (8 yrs) 52 mg IUD [id number] Yes 52 mg by intrauterine route 1 time. IN 2/12/24  OUT 2/32 Historical Provider, MD Taking Active   methylPREDNISolone (Medrol Dospak) 4 mg tablets [id number]  Follow schedule on package instructions   Patient not taking: Reported on 3/25/2025    Raymond Zamora DO  Active     Discontinued 03/20/25 1143     Discontinued 03/24/25 1315   omeprazole (PriLOSEC) 40 mg DR capsule [id number] Yes Take daily before breakfast or 30 minutes before your biggest meal. Fang Hoff, APRN-CNP  Active   Simponi 100 mg/mL syringe [id number] Yes Inject 1 mL (100 mg) under the skin every  30 (thirty) days. Historical Provider, MD Taking Active                        Family History   Problem Relation Name Age of Onset    Skin cancer Mother Neil Strange     Hypertension Mother Neil Strange     Ankylosing spondylitis Father Neil Strange     Hypertension Father Neil Strange     Skin cancer Father Neil Strange     Arthritis Father Neil Strange     Cancer Father Neil Strange     Skin cancer Sister            Social History     Tobacco Use    Smoking status: Never     Passive exposure: Never    Smokeless tobacco: Never   Vaping Use    Vaping status: Never Used   Substance Use Topics    Alcohol use: Yes     Alcohol/week: 1.0 standard drink of alcohol     Types: 1 Glasses of wine per week     Comment: 1x week    Drug use: Never         Review of Systems    REVIEW OF SYSTEMS:  Constitutional: Mild fatigue  Skin:  No rash or psoriasis or photosensitvity  Head: No headache, oral ulcers or hair loss  Neck: Has difficulty swallowing but choking  Eyes: No dry eyes or iritis  Mouth: Has dry mouth  Pulmonary: No wheezing, pleurisy or SOB  Cardiovascular: No chest pain or palpitations  Gastrointestinal: Heartburn with NSAIds  Endocrine: No Raynaud's   Musculoskeletal: As H/P        PHYSICAL EXAM:  Blood pressure 128/86, pulse 79, temperature 36.9 °C (98.5 °F), weight 76.7 kg (169 lb), not currently breastfeeding.  General - NAD, sitting up in chair, well-groomed, pleasant, AAOx3  Head: Normocephalic, atraumatic  Eyes - PERRLA, EOMI. No conjunctiva injection.   Mouth/ENT -  No facial rash. Skin - No rashes or ulcers.   Skin warm and dry. No erythema on bilateral cheeks  EXAMJOINTDETAILED,  No joint swelling or tenderness  Sacroiliac joints: No local tenderness. JEFFERSON negative.   Hips: Full ROM.  No malalignment.  Knees:  Full ROM, without pain, no swelling, warmth or point tenderness. No joint line tenderness, has pes anserine tenderness.  Ankles: Full ROM, without pain, swelling, warmth or tenderness.  Back: No restriction of movement      DATE OF EXAM: Jan 16 2024  1:10PM      VHX   5213  -  XR KNEE SURVEY 1V  AP ARABELLA  / ACCESSION #  [id number]     PROCEDURE REASON: Ankylosing spondylitis of sacral region (HCC)          * * * * Physician Interpretation * * * *      EXAMINATION:  XR KNEE SURVEY 1V  AP ARABELLA     HISTORY:   Akylosing spondylitis, patient states having bilateral knee   pain since late November, pain in left knee worse than pain in right knee    Ankylosing spondylitis of sacral region (HCC)   .     TECHNIQUE:  XR KNEE SURVEY 1V  AP ARABELLA      Laterality:  NOT APPLICABLE      Number of different views (projections): 1      M:  XB_1     COMPARISON:     RESULT:     Mild narrowing of the medial compartments bilaterally and minimal   narrowing of the lateral compartments. Small marginal osteophytes   medially bilaterally. Partially seen right femoral intramedullary dinesh.     XR pelvis 3+ views 11/25/2024 [id number] Final       Assessment/Plan: 34 yr old with H/O RENE with positive PEG in a titer 1:1280. Continues to have dry mouth dry mouth. Will get a minor salivary gland biopsy.    No imaging evidence of sacroilliits. CRP is normal  Knee pain is better. Will increase the interval of Simponi every 6 weeks.     Renee Brenner MD

## 2025-04-08 ENCOUNTER — CLINICAL SUPPORT (OUTPATIENT)
Dept: AUDIOLOGY | Facility: CLINIC | Age: 35
End: 2025-04-08
Payer: COMMERCIAL

## 2025-04-08 DIAGNOSIS — H72.91 PERFORATION OF RIGHT TYMPANIC MEMBRANE: Primary | ICD-10-CM

## 2025-04-08 NOTE — PROGRESS NOTES
EARMOLD IMPRESSION   HISTORY  Fang Carvalho was seen today for an Ear Mold . She has a right tympanic membrane perforation.     Ear mold impression: Swim mold impression    Color: lavender   Style: swim mold  Material: TFC    date estimation: 3 weeks    Otoscopy: Otoscopy indicated clear ear canals and visualization of the tympanic membrane bilaterally. Right TM perforation noted    Swim mold fit appropriately. Paid in full on 3/21/2025.    Warranty: Patient was informed of the 90 day no charge re-make policy.     Time Stamp: [id number]    Completed by:  Fay Florez, CCC-A  Licensed Senior Audiologist

## 2025-04-22 ENCOUNTER — TREATMENT (OUTPATIENT)
Dept: PHYSICAL THERAPY | Facility: CLINIC | Age: 35
End: 2025-04-22
Payer: COMMERCIAL

## 2025-04-22 DIAGNOSIS — M22.2X1 PATELLOFEMORAL DISORDER OF BOTH KNEES: ICD-10-CM

## 2025-04-22 DIAGNOSIS — M70.52 PES ANSERINUS BURSITIS OF BOTH KNEES: ICD-10-CM

## 2025-04-22 DIAGNOSIS — M22.2X2 PATELLOFEMORAL DISORDER OF BOTH KNEES: ICD-10-CM

## 2025-04-22 DIAGNOSIS — M70.51 PES ANSERINUS BURSITIS OF BOTH KNEES: ICD-10-CM

## 2025-04-22 PROCEDURE — 97112 NEUROMUSCULAR REEDUCATION: CPT | Mod: GP

## 2025-04-22 PROCEDURE — 97110 THERAPEUTIC EXERCISES: CPT | Mod: GP

## 2025-04-22 PROCEDURE — 97164 PT RE-EVAL EST PLAN CARE: CPT | Mod: GP

## 2025-04-22 NOTE — PROGRESS NOTES
Physical Therapy    Physical Therapy Re-evaluation & Treatment    Patient Name: Fang Carvalho  MRN: [id number]  Today's Date: 4/22/2025  Time Calculation  Start Time: 0800  Stop Time: 0845  Time Calculation (min): 45 min        Insurance:  Visit: #8  Authorized: 20  Certification:  2/10/2025 - 2/10/2026   Payor: MIKE / Plan: MIKE HMP / Product Type: *No Product type* /       Subjective:  Patient reports to physical therapy with improvements in function during stair navigation, being able to perform reciprocally without having to perform a step to pattern noting that this has been the most noticeable improvement in her symptoms recently. She just came back from a traveling trip requiring prolonged sitting with noticeable swelling to the lateral aspect of the left knee joint. Has maintained HEP compliance regularly despite most recent 3 week lapse in scheduled physical therapy sessions.       Current pain:   0/10  L knee (stiffness) = 0/10         Objective:  Hip AROM   ER (seated)   (L) =  30 A  (R) =  27 A    (-) FADIR but still experiences pain to the lateral knee bilaterally       Knee AROM  Flexion = (L) 115 ; (R) 120     Foot adduction AROM  (L) = 26   (R) = 10      Hip specific strength   Glute medius   L = 4+/5   R = 4/5     Front step down  3 inch = able to perform bilaterally without reproduction of symptoms  6 inch =   L = no reproduction of pain and able to perform full depth with min dynamic knee valgus   R = increased lateral knee pain during performance with min dynamic knee valgus         Treatment:  Therapeutic Exercise  = 1 unit  Added to HEP   Sidelying clam shells (GTB) = x15 each side   Self joint mobilization into tibial internal rotation with forward knee bending on step = 10x each side   Review of HEP   Seated ankle inversion (blue theraband) = x20 each side     NMR = 1 unit  Sumo squat with green theraband = 3x5         HEP: #4 (Access Code: [email or web address])  - Standing Knee Flexion Stretch on  Step  - 1 x daily - 7 x weekly - 1 sets - 10 reps  - Prone Quadriceps Stretch with Strap  - 1 x daily - 7 x weekly - 3 sets - 30 seconds hold  - Seated Ankle Inversion with Anchored Resistance  - 1 x daily - 7 x weekly - 2 sets - 20 reps  - Clam with Resistance  - 1 x daily - 7 x weekly - 1 sets - 15 reps  - Squat in Wide Stance with External Rotation  - 1 x daily - 3-4 x weekly - 3 sets - 5 reps          Diagnosis:  1. Pes anserinus bursitis of both knees    2. Patellofemoral disorder of both knees            Assessment:   Reassessment of patient impairments as they pertain to her bilateral knee pain demonstrates improvements in single leg tolerance to squat/taps on steps with minimal pain reproduction at a 6 inch step only on the RLE. She continues to remain limited in active and passive hip external rotation likely from lack of muscle flexibility and improper joint mechanics at the tibiofemoral joint with increased tibial external rotation during knee flexion rather than internal rotation. For that reason, patient taught self MWM into tibial internal rotation with forward knee bending that can and will be performed as part of HEP. Her active knee flexion ROM in session was limited compared to the initial evaluation but this may be in part to recent travel time with increased sitting times and visible lateral joint line swelling on the LLE. Patient will have 2 additional appointments in physical therapy after today's session with the goal of discharging and improved functional tolerance to dynamic movements requiring increased knee flexion angles and quadriceps strength. Patient will continue to benefit from physical therapy services in order to maximize functional outcomes.          Plan:  Continue POC as tolerated  Improve posterolateral hip musculature strength and re-education in open and closed chain positions   Improve hip ER PROM/AROM bilaterally with manual therapy and muscle activation          Goals:  Patient will be independent with HEP.  MET    OUTCOME MEASURE:   Patient will report improvement in LEFS outcome measure >55/80 in 4 weeks to meet MCID..   MET (3/10/25)  Patient will report improvement in LEFS outcome measure >64/80 in 8 weeks to meet MCID..   MET (3/10/25)  Patient will report improvement in LEFS outcome measure >73/80 in 12 weeks to meet MCID.     STRENGTH / MOTOR CONTROL:   Patient will demonstrate at least 4-/5 proximal hip MMT bilaterally in 4 weeks to improve strength of hip and trunk.   MET   Patient will demonstrate at least 4/5 proximal hip MMT bilaterally in 8 weeks to improve strength of hip and trunk.   MET     FUNCTION  Patient will either report or demonstrate being able to ascend/descend a flight of stairs reciprocally with <2/10 pain in bilateral knees in 8 weeks.   MET (3/10/25)  Patient will demonstrate the ability to descend 6 inch steps without experiencing anterior knee pain in order to perform stair navigation at home and in the community.   80% met           Axel Santacruz, PT

## 2025-04-24 NOTE — PROGRESS NOTES
History of Present Illness    Fang Carvalho is a 34 y.o. female who is seen at the request of Dr. Renee Brenner for a lower lip biopsy.  She has a possible diagnosis of Sjogren.  She has had issues with dryness in her mouth for quite some time.      Past Medical History    The past medical history and review the system shows a bipolar and anxiety disorder.  She had issues with polyarthritis.  Her medications are documented in the chart.  The allergies to her medications are side effects as opposed to allergies but at the same time they are causing her to have psychosis.  They are listed in the chart.  She does not smoke or drink.  She is a .  She is here alone today.    Physical Exam    The patient is alert and oriented.  There is impacted cerumen in the left ear which was addressed with a small instrument.  Examination of the external ears, ear canals, and eardrums, is within normal limits. Examination of the anterior and external nose is negative. Examination of the oral cavity and oropharynx is normal. There is no evidence of any mucosal lesions. There is good mobility of the tongue and palate. There is good mandibular excursion. Palpation of the parotid, neck, and thyroid field fails to show any worrisome masses or adenopathies.    After verbal consent and under Xylocaine 1% to 100,000 epinephrine a small incision is made on the inner aspect of the right lower lip.  Multiple glands were visualized and 6 of them were harvested.  The area was closed with a running 5-0 fast.  This was well-tolerated.    Assessment and Plan    Possible Sjogren's for which a minor salivary gland biopsy was performed.  Material was obtained.  This was well-tolerated.  She is to call us if there is any issues.  She was instructed in the management of the wound.    Impacted cerumen in the left ear which was addressed with a small instrument.    I will see her on a as needed basis.  
MONICA

## 2025-04-25 ENCOUNTER — OFFICE VISIT (OUTPATIENT)
Dept: OTOLARYNGOLOGY | Facility: HOSPITAL | Age: 35
End: 2025-04-25
Payer: COMMERCIAL

## 2025-04-25 VITALS — BODY MASS INDEX: 29.13 KG/M2 | WEIGHT: 169.7 LBS

## 2025-04-25 DIAGNOSIS — M35.01 SJOGREN'S SYNDROME WITH KERATOCONJUNCTIVITIS SICCA: ICD-10-CM

## 2025-04-25 DIAGNOSIS — H61.22 IMPACTED CERUMEN OF LEFT EAR: Primary | ICD-10-CM

## 2025-04-25 PROCEDURE — 1036F TOBACCO NON-USER: CPT | Performed by: OTOLARYNGOLOGY

## 2025-04-25 PROCEDURE — 99213 OFFICE O/P EST LOW 20 MIN: CPT | Mod: 25 | Performed by: OTOLARYNGOLOGY

## 2025-04-25 PROCEDURE — 42405 BIOPSY OF SALIVARY GLAND: CPT | Performed by: OTOLARYNGOLOGY

## 2025-04-25 PROCEDURE — 69210 REMOVE IMPACTED EAR WAX UNI: CPT | Performed by: OTOLARYNGOLOGY

## 2025-04-25 PROCEDURE — 99213 OFFICE O/P EST LOW 20 MIN: CPT | Performed by: OTOLARYNGOLOGY

## 2025-04-25 ASSESSMENT — PATIENT HEALTH QUESTIONNAIRE - PHQ9
2. FEELING DOWN, DEPRESSED OR HOPELESS: NOT AT ALL
SUM OF ALL RESPONSES TO PHQ9 QUESTIONS 1 AND 2: 0
1. LITTLE INTEREST OR PLEASURE IN DOING THINGS: NOT AT ALL

## 2025-04-29 ENCOUNTER — TREATMENT (OUTPATIENT)
Dept: PHYSICAL THERAPY | Facility: CLINIC | Age: 35
End: 2025-04-29
Payer: COMMERCIAL

## 2025-04-29 DIAGNOSIS — M22.2X2 PATELLOFEMORAL DISORDER OF BOTH KNEES: ICD-10-CM

## 2025-04-29 DIAGNOSIS — M70.51 PES ANSERINUS BURSITIS OF BOTH KNEES: ICD-10-CM

## 2025-04-29 DIAGNOSIS — M22.2X1 PATELLOFEMORAL DISORDER OF BOTH KNEES: ICD-10-CM

## 2025-04-29 DIAGNOSIS — M70.52 PES ANSERINUS BURSITIS OF BOTH KNEES: ICD-10-CM

## 2025-04-29 LAB
LABORATORY COMMENT REPORT: NORMAL
PATH REPORT.FINAL DX SPEC: NORMAL
PATH REPORT.GROSS SPEC: NORMAL
PATH REPORT.RELEVANT HX SPEC: NORMAL
PATH REPORT.TOTAL CANCER: NORMAL

## 2025-04-29 PROCEDURE — 97110 THERAPEUTIC EXERCISES: CPT | Mod: GP

## 2025-04-29 PROCEDURE — 97140 MANUAL THERAPY 1/> REGIONS: CPT | Mod: GP

## 2025-04-29 PROCEDURE — 97112 NEUROMUSCULAR REEDUCATION: CPT | Mod: GP

## 2025-04-29 NOTE — PROGRESS NOTES
"Physical Therapy    Physical Therapy Re-evaluation & Treatment    Patient Name: Fang Carvalho  MRN: [id number]  Today's Date: 4/29/2025  Time Calculation  Start Time: 0802  Stop Time: 0845  Time Calculation (min): 43 min        Insurance:  Visit: #9  Authorized: 20  Certification:  2/10/2025 - 2/10/2026   Payor: MIKE / Plan: MIKE HMP / Product Type: *No Product type* /       Subjective:  Patient reports to physical therapy with increased difficulty in performing most updated HEP involving the squats focusing on improving femoral external rotation to aid in achieving deeper knee flexion. The movement at the step involving manual joint mobilization has gotten easier figuring out her hand placement and improving available ROM.     Today she notes that since it is early in the morning she is experiencing increased pain to the left knee with some noticeable swelling compared to the right knee. Nothing provoked current symptoms other than likely a result of rheumatic joint irritation.         Current pain:   0/10  L knee (stiffness) = 1/10         Objective:  Knee AROM  (L) = 0-120 A  (R) = 0-120 A     Foot adduction AROM  (L) = 26   (R) = 10            Treatment:  Manual Therapy = 1 unit   Tibiofemoral joint  Anterior drawer position grade 2 oscillatory (LLE)   Patellofemoral joint   (LLE) = medial and inferior grade 3 MWM with AA knee flexion   Belt assisted lateral mob for hip joint   lateral grade 2 oscillatory mob   MWM into hip ER     Therapeutic Exercise  = 1 unit  Review of HEP   Self MWM into tibial internal rotation with half kneeling rocking fwd/bckwd = 10x each side   Ankle inversion seated (Blue theraband) = 2x20 each sides   Sidelying clam shells = 2x15       NMR = 1 unit  Sumo squat with green theraband = 3x5   Lateral banded side walk (GTB) = 2x30\"         HEP: #4 (Access Code: [email or web address])  - Standing Knee Flexion Stretch on Step  - 1 x daily - 7 x weekly - 1 sets - 10 reps  - Prone Quadriceps Stretch " with Strap  - 1 x daily - 7 x weekly - 3 sets - 30 seconds hold  - Seated Ankle Inversion with Anchored Resistance  - 1 x daily - 7 x weekly - 2 sets - 20 reps  - Clam with Resistance  - 1 x daily - 7 x weekly - 1 sets - 15 reps  - Squat in Wide Stance with External Rotation  - 1 x daily - 3-4 x weekly - 3 sets - 5 reps          Diagnosis:  1. Pes anserinus bursitis of both knees    2. Patellofemoral disorder of both knees            Assessment:   Patient continues to respond well to interventions in session focusing on improving joint mechanics into knee flexion (tibial internal rotation) as well as femoral external rotation during functional movements. She continues to struggle with obtaining additional knee flexion ranges as a result of increased knee pain, but understands that when she is able to maintain a femorally external rotated position it reduces her symptoms. Continued discussions with patient regarding potential discharge in upcoming sessions based on tolerance for patient to withstand prolonged walking and stair navigation during upcoming out of state trip. Will discuss with patient next visit symptom provocation, if any, and determine irritability with symptom reproduction during functional movements throughout the day. Patient will continue to benefit from physical therapy services in order to maximize functional outcomes.              Plan:  Continue POC as tolerated  Improve posterolateral hip musculature strength and re-education in open and closed chain positions   Improve hip ER PROM/AROM bilaterally with manual therapy and muscle activation         Goals:  Patient will be independent with HEP.  MET    OUTCOME MEASURE:   Patient will report improvement in LEFS outcome measure >55/80 in 4 weeks to meet MCID..   MET (3/10/25)  Patient will report improvement in LEFS outcome measure >64/80 in 8 weeks to meet MCID..   MET (3/10/25)  Patient will report improvement in LEFS outcome measure >73/80 in  12 weeks to meet MCID.     STRENGTH / MOTOR CONTROL:   Patient will demonstrate at least 4-/5 proximal hip MMT bilaterally in 4 weeks to improve strength of hip and trunk.   MET   Patient will demonstrate at least 4/5 proximal hip MMT bilaterally in 8 weeks to improve strength of hip and trunk.   MET     FUNCTION  Patient will either report or demonstrate being able to ascend/descend a flight of stairs reciprocally with <2/10 pain in bilateral knees in 8 weeks.   MET (3/10/25)  Patient will demonstrate the ability to descend 6 inch steps without experiencing anterior knee pain in order to perform stair navigation at home and in the community.   80% met           Axel Santacruz, PT

## 2025-05-06 ENCOUNTER — TREATMENT (OUTPATIENT)
Dept: PHYSICAL THERAPY | Facility: CLINIC | Age: 35
End: 2025-05-06
Payer: COMMERCIAL

## 2025-05-06 DIAGNOSIS — M70.51 PES ANSERINUS BURSITIS OF BOTH KNEES: ICD-10-CM

## 2025-05-06 DIAGNOSIS — M70.52 PES ANSERINUS BURSITIS OF BOTH KNEES: ICD-10-CM

## 2025-05-06 DIAGNOSIS — M22.2X2 PATELLOFEMORAL DISORDER OF BOTH KNEES: ICD-10-CM

## 2025-05-06 DIAGNOSIS — M22.2X1 PATELLOFEMORAL DISORDER OF BOTH KNEES: ICD-10-CM

## 2025-05-06 PROCEDURE — 97164 PT RE-EVAL EST PLAN CARE: CPT | Mod: GP

## 2025-05-06 PROCEDURE — 97140 MANUAL THERAPY 1/> REGIONS: CPT | Mod: GP

## 2025-05-06 NOTE — PROGRESS NOTES
Physical Therapy    Physical Therapy Re-evaluation & Treatment    Patient Name: Fang Carvalho  MRN: [id number]  Today's Date: 5/7/2025  Time Calculation  Start Time: 0802  Stop Time: 0840  Time Calculation (min): 38 min        Insurance:  Visit: #10  Authorized: 20  Certification:  2/10/2025 - 2/10/2026   Payor: MIKE / Plan: MIKE HMP / Product Type: *No Product type* /       Subjective:  Patient reports to physical therapy after return from recent trip involving prolonged walking and sitting noting that she would experience an increase in swelling and overall discomfort mostly felt in the mornings, not during the walking or dynamic activities. This is much different than previous functional capacity because she would not be able to continuously perform dynamic walking and stair navigation, but now she is able to tolerate these movements only noting pain the following day.       Current pain:   R Knee = 0/10  L knee = 2/10         Objective:  Outcome Measure  LEFS = 65/80    Knee AROM  (L) = 0-120 A  (R) = 0-120 A     Foot adduction AROM  (L) = 30   (R) = 20    Glute Med MMT   (L) = 4+/5  (R) = 4/5     Front Step Down/Tap   (L) =   No evidence of dynamic knee valgus   No reports of anterior knee pain   (R) =   Min-mod dynamic knee valgus with patellar ligament pain   Improved inferior patellar pain with cueing for femoral external rotation       Treatment:  Manual Therapy = 2 unit   Tibiofemoral joint  Anterior drawer position grade 2 oscillatory (LLE)   Prone PA grade 3 oscillatory with passive knee flexion   Patellofemoral joint   (LLE) = medial and inferior grade 3 MWM with AA knee flexion   Belt assisted lateral mob for hip joint   lateral grade 2 oscillatory mob   MWM into hip ER           HEP: #4 (Access Code: [email or web address])  - Standing Knee Flexion Stretch on Step  - 1 x daily - 7 x weekly - 1 sets - 10 reps  - Prone Quadriceps Stretch with Strap  - 1 x daily - 7 x weekly - 3 sets - 30 seconds hold  - Seated  Ankle Inversion with Anchored Resistance  - 1 x daily - 7 x weekly - 2 sets - 20 reps  - Clam with Resistance  - 1 x daily - 7 x weekly - 1 sets - 15 reps  - Squat in Wide Stance with External Rotation  - 1 x daily - 3-4 x weekly - 3 sets - 5 reps          Diagnosis:  1. Pes anserinus bursitis of both knees    2. Patellofemoral disorder of both knees            Assessment:   Patient has been able to continue to improve in previously experienced bilateral knee symptoms since beginning physical therapy, noting no worsening of symptoms with functional movement patterns including prolonged ambulation and stair navigation. Re-assessment of previously symptomatic movements confirm that proper kinetic chain mechanics that have been emphasized with physical therapy aid in eliminating anterior knee symptoms, however AROM into knee flexion remains limited due to rheumatic-related discomfort in the early part of the morning. Discussion regarding continued performance of current HEP is recommended to maintain function and minimize anterior knee symptoms long term. Will plan on keeping patient's episode open for the next 4 weeks if symptoms return or worsen in the upcoming week/s.             Plan:  Patient discharged         Goals:  Patient will be independent with HEP.  MET    OUTCOME MEASURE:   Patient will report improvement in LEFS outcome measure >55/80 in 4 weeks to meet MCID..   MET (3/10/25)  Patient will report improvement in LEFS outcome measure >64/80 in 8 weeks to meet MCID..   MET (3/10/25)  Patient will report improvement in LEFS outcome measure >73/80 in 12 weeks to meet MCID.     STRENGTH / MOTOR CONTROL:   Patient will demonstrate at least 4-/5 proximal hip MMT bilaterally in 4 weeks to improve strength of hip and trunk.   MET   Patient will demonstrate at least 4/5 proximal hip MMT bilaterally in 8 weeks to improve strength of hip and trunk.   MET     FUNCTION  Patient will either report or demonstrate being  able to ascend/descend a flight of stairs reciprocally with <2/10 pain in bilateral knees in 8 weeks.   MET (3/10/25)  Patient will demonstrate the ability to descend 6 inch steps without experiencing anterior knee pain in order to perform stair navigation at home and in the community.   80% met           Axel Santacruz, PT

## 2025-05-27 ENCOUNTER — TELEPHONE (OUTPATIENT)
Dept: PRIMARY CARE | Facility: CLINIC | Age: 35
End: 2025-05-27
Payer: COMMERCIAL

## 2025-05-27 ENCOUNTER — TELEMEDICINE (OUTPATIENT)
Dept: PRIMARY CARE | Facility: CLINIC | Age: 35
End: 2025-05-27
Payer: COMMERCIAL

## 2025-05-27 DIAGNOSIS — J01.90 ACUTE SINUSITIS, RECURRENCE NOT SPECIFIED, UNSPECIFIED LOCATION: Primary | ICD-10-CM

## 2025-05-27 PROCEDURE — 98012 SYNCH AUDIO-ONLY EST SF 10: CPT | Performed by: FAMILY MEDICINE

## 2025-05-27 PROCEDURE — 1036F TOBACCO NON-USER: CPT | Performed by: FAMILY MEDICINE

## 2025-05-27 RX ORDER — AMOXICILLIN AND CLAVULANATE POTASSIUM 875; 125 MG/1; MG/1
875 TABLET, FILM COATED ORAL 2 TIMES DAILY
Qty: 14 TABLET | Refills: 0 | Status: SHIPPED | OUTPATIENT
Start: 2025-05-27 | End: 2025-06-03

## 2025-05-27 ASSESSMENT — ENCOUNTER SYMPTOMS
WHEEZING: 0
FEVER: 0
COUGH: 0
SINUS PRESSURE: 1

## 2025-05-27 NOTE — PROGRESS NOTES
Subjective   Patient ID: Fang Carvalho is a 35 y.o. female who presents for No chief complaint on file..    Sinusitis  This is a new problem. The current episode started in the past 7 days. Associated symptoms include congestion and sinus pressure. Pertinent negatives include no coughing.          Virtual or Telephone Consent    An interactive audio and video telecommunication system which permits real time communications between the patient (at the originating site) and provider (at the distant site) was utilized to provide this telehealth service.   Verbal consent was requested and obtained from Fang Carvalho on this date, 05/27/25 for a telehealth visit and the patient's location was confirmed at the time of the visit.       I attempted several times, but I was not able to establish a video connection today.  Today's visit instead was done over the telephone, with audio only  She is concerned about a possible sinus infection.  Symptoms started almost 1 week ago.  She has had sinus pressure, nasal congestion and dark green nasal drainage.  No cough, ear pain, wheezing or fever            Review of Systems   Constitutional:  Negative for fever.   HENT:  Positive for congestion and sinus pressure.    Respiratory:  Negative for cough and wheezing.        Objective   There were no vitals taken for this visit.    Physical Exam    Assessment/Plan   Assessment & Plan  Acute sinusitis, recurrence not specified, unspecified location    Orders:    amoxicillin-clavulanate (Augmentin) 875-125 mg tablet; Take 1 tablet (875 mg) by mouth 2 times a day for 7 days.    She presents today with a 1 week history of sinusitis symptoms including sinus pressure, nasal congestion green nasal drainage.  Based on duration, I suspect that this is most likely bacterial.  Will treat with Augmentin twice daily x 7 days and follow-up in 1 week if symptoms have not resolved with antibiotic  Total time spent today was 12 minutes

## 2025-05-28 ENCOUNTER — TELEPHONE (OUTPATIENT)
Dept: PRIMARY CARE | Facility: CLINIC | Age: 35
End: 2025-05-28
Payer: COMMERCIAL

## 2025-05-28 NOTE — TELEPHONE ENCOUNTER
I spoke with her on the phone.  I saw her for a virtual visit yesterday and prescribed Augmentin for acute sinusitis  Last night she developed a rash on both of her upper cheeks.  She has never had a rash like this in the past.  She has been on Augmentin in the past without any problems.  No rash anywhere else.  She denies any other new or worsening symptoms since I saw her yesterday  There is no pain or itch  I do not suspect allergic reaction, since she has been on Augmentin previously without any problems.  Since this is only involving her face and not anywhere else on her body, and does not hurt or itch, I recommended monitoring and following up in the office within the next 3 to 5 days if not resolving.  Advised to call if rash worsens

## 2025-05-29 DIAGNOSIS — M17.10 ARTHRITIS OF KNEE: ICD-10-CM

## 2025-05-29 DIAGNOSIS — M35.01 SJOGREN'S SYNDROME WITH KERATOCONJUNCTIVITIS SICCA: ICD-10-CM

## 2025-05-29 RX ORDER — GOLIMUMAB 100 MG/ML
100 INJECTION, SOLUTION SUBCUTANEOUS
Qty: 4 ML | Refills: 1 | Status: SHIPPED | OUTPATIENT
Start: 2025-05-29

## 2025-06-15 ENCOUNTER — OFFICE VISIT (OUTPATIENT)
Dept: URGENT CARE | Age: 35
End: 2025-06-15
Payer: COMMERCIAL

## 2025-06-15 VITALS
RESPIRATION RATE: 18 BRPM | SYSTOLIC BLOOD PRESSURE: 109 MMHG | HEART RATE: 105 BPM | BODY MASS INDEX: 28.85 KG/M2 | HEIGHT: 64 IN | OXYGEN SATURATION: 96 % | WEIGHT: 169 LBS | TEMPERATURE: 98.2 F | DIASTOLIC BLOOD PRESSURE: 76 MMHG

## 2025-06-15 DIAGNOSIS — J02.9 SORE THROAT: ICD-10-CM

## 2025-06-15 DIAGNOSIS — J03.90 ACUTE TONSILLITIS, UNSPECIFIED ETIOLOGY: Primary | ICD-10-CM

## 2025-06-15 LAB
POC HUMAN RHINOVIRUS PCR: NEGATIVE
POC INFLUENZA A VIRUS PCR: NEGATIVE
POC INFLUENZA B VIRUS PCR: NEGATIVE
POC RESPIRATORY SYNCYTIAL VIRUS PCR: NEGATIVE
POC STREPTOCOCCUS PYOGENES (GROUP A STREP) PCR: NEGATIVE

## 2025-06-15 RX ORDER — CEPHALEXIN 500 MG/1
500 CAPSULE ORAL 2 TIMES DAILY
Qty: 20 CAPSULE | Refills: 0 | Status: SHIPPED | OUTPATIENT
Start: 2025-06-15 | End: 2025-06-25

## 2025-06-15 RX ORDER — PREDNISONE 20 MG/1
20 TABLET ORAL DAILY
Qty: 7 TABLET | Refills: 0 | Status: SHIPPED | OUTPATIENT
Start: 2025-06-15 | End: 2025-06-22

## 2025-06-15 ASSESSMENT — ENCOUNTER SYMPTOMS
SORE THROAT: 1
FEVER: 1

## 2025-06-15 NOTE — PROGRESS NOTES
"Subjective   Patient ID: Fang Carvalho is a 35 y.o. female. They present today with a chief complaint of Fever (100.5 tylenol ), Sore Throat (Very swollen, 6/11 has slowly gotten more swollen. Hard to swallow ), and Generalized Body Aches (Muscle aches in the back. ).    History of Present Illness  PT presents to the  with the c/o fever of 100.5, took Tylenol and now no fever. She also c/o ST x 4 days, huts to swallow and generalized body aches. HR is elevated most likely secondary to fever and illness. No other associated symptoms. No other concerns. Denies difficulty swallowing      Fever   Associated symptoms include a sore throat.   Sore Throat         Past Medical History  Allergies as of 06/15/2025 - Reviewed 06/15/2025   Allergen Reaction Noted    Biaxin [clarithromycin] Psychosis 04/24/2023       Prescriptions Prior to Admission[1]     Medical History[2]    Surgical History[3]     reports that she has never smoked. She has never been exposed to tobacco smoke. She has never used smokeless tobacco. She reports current alcohol use of about 1.0 standard drink of alcohol per week. She reports that she does not use drugs.    Review of Systems  Review of Systems   Constitutional:  Positive for fever.   HENT:  Positive for sore throat.      10 point ROS completed and all are negative other than what is stated in the current HPI                               Objective    Vitals:    06/15/25 0858 06/15/25 0931   BP: 109/76    Pulse: (!) 120 105   Resp: 18    Temp: 36.8 °C (98.2 °F)    SpO2: 96%    Weight: 76.7 kg (169 lb)    Height: 1.626 m (5' 4\")      No LMP recorded. Patient has had an implant.    Physical Exam  Vitals and nursing note reviewed.   Constitutional:       Appearance: Normal appearance. She is ill-appearing. She is not toxic-appearing.   HENT:      Head: Normocephalic.      Mouth/Throat:      Mouth: Mucous membranes are moist.      Pharynx: Oropharyngeal exudate and posterior oropharyngeal erythema " present.      Comments: (+2) Tonsillar enlargement  Cardiovascular:      Rate and Rhythm: Normal rate and regular rhythm.   Pulmonary:      Effort: Pulmonary effort is normal.      Breath sounds: Normal breath sounds.   Musculoskeletal:         General: Normal range of motion.      Cervical back: Tenderness present.   Lymphadenopathy:      Cervical: Cervical adenopathy present.   Skin:     General: Skin is warm and dry.      Capillary Refill: Capillary refill takes less than 2 seconds.      Findings: No rash.   Neurological:      Mental Status: She is alert and oriented to person, place, and time.   Psychiatric:         Behavior: Behavior normal.         Procedures    Point of Care Test & Imaging Results from this visit  Results for orders placed or performed in visit on 06/15/25   POCT SPOTFIRE R/ST Panel Mini w/Strep A ([employer]) manually resulted   Result Value Ref Range    POC Group A Strep, PCR Negative Negative    POC Respiratory Syncytial Virus PCR Negative Negative    POC Influenza A Virus PCR Negative Negative    POC Influenza B Virus PCR Negative Negative    POC Human Rhinovirus PCR Negative Negative      Imaging  No results found.    Cardiology, Vascular, and Other Imaging  No other imaging results found for the past 2 days      Diagnostic study results (if any) were reviewed by CYNTHIA Valdes.    Assessment/Plan   Allergies, medications, history, and pertinent labs/EKGs/Imaging reviewed by CYNTHIA Valdes.     Medical Decision Making  Acute Tonsillitis:  - STREP A SPOTFIRE negative; however with PE and symptoms at this time advised could be another strand of strep at this time or other possible virus we do not check for; will treat with antibiotics  - No difficulty swallowing   - Throw out toothbrush in the next 4 days to prevent re-infection   - Good oral hydration to prevent dehydration   - Warm salt gargles; Cepacol drops/spray OTC   - Advised on s/s to seek emergent  care for   - Tylenol/Motrin as needed for pain or fever     Orders and Diagnoses  Diagnoses and all orders for this visit:  Acute tonsillitis, unspecified etiology  -     predniSONE (Deltasone) 20 mg tablet; Take 1 tablet (20 mg) by mouth once daily for 7 days.  -     cephalexin (Keflex) 500 mg capsule; Take 1 capsule (500 mg) by mouth 2 times a day for 10 days.  Sore throat  -     POCT SPOTFIRE R/ST Panel Mini w/Strep A ([employer]) manually resulted      Medical Admin Record      Patient disposition: Home    Electronically signed by OSBALDO Valdes-STALIN  9:35 AM           [1] (Not in a hospital admission)   [2]   Past Medical History:  Diagnosis Date    ADHD (attention deficit hyperactivity disorder) 1/1/2019    Ankylosing spondylitis     Ankylosing spondylitis of site in spine (Multi)     Arthritis 1/1/2002    Bipolar disorder, unspecified (Multi) 01/05/2023    Bipolar 1 disorder    Eczema 3/1/2023    GERD without esophagitis     Hyperthyroidism complicating pregnancy ([facility])     Long term (current) use of immunosuppressive biologic 12/30/2015    Adalimumab (Humira) long-term use    Osteoarthritis     Personal history of other diseases of the musculoskeletal system and connective tissue 01/05/2023    History of ankylosing spondylitis    Polycystic ovarian syndrome 01/05/2023    PCOS (polycystic ovarian syndrome)    Raynaud's syndrome     Raynaud's syndrome without gangrene     Raynaud's phenomenon without gangrene    Rheumatoid arthritis 1/1/2001    Tendinitis    [3]   Past Surgical History:  Procedure Laterality Date    FEMUR FRACTURE SURGERY      FRACTURE SURGERY  4/1/2014    OTHER SURGICAL HISTORY  01/05/2023    Femur fracture repair

## 2025-06-15 NOTE — PATIENT INSTRUCTIONS
Acute Tonsillitis:  - STREP A SPOTFIRE negative; however with PE and symptoms at this time advised could be another strand of strep at this time or other possible virus we do not check for; will treat with antibiotics  - No difficulty swallowing   - Throw out toothbrush in the next 4 days to prevent re-infection   - Good oral hydration to prevent dehydration   - Warm salt gargles; Cepacol drops/spray OTC   - Advised on s/s to seek emergent care for   - Tylenol/Motrin as needed for pain or fever

## 2025-06-17 ENCOUNTER — TELEPHONE (OUTPATIENT)
Dept: PRIMARY CARE | Facility: CLINIC | Age: 35
End: 2025-06-17
Payer: COMMERCIAL

## 2025-06-19 ENCOUNTER — CLINICAL SUPPORT (OUTPATIENT)
Dept: AUDIOLOGY | Facility: CLINIC | Age: 35
End: 2025-06-19
Payer: COMMERCIAL

## 2025-06-19 DIAGNOSIS — H72.91 PERFORATION OF RIGHT TYMPANIC MEMBRANE: Primary | ICD-10-CM

## 2025-06-19 NOTE — PROGRESS NOTES
EARMOLD IMPRESSION   HISTORY  Fang Carvalho was seen today for an Ear Mold problem. She has a right tympanic membrane perforation. She purchased a right swim mold on 3/21/2025 but the swim mold is leaking and does not provide appropriate seal.    Ear mold impression: Swim mold impression    Color: lavender   Style: swim mold  Material: [facility]    date estimation: 3 weeks    Otoscopy: Otoscopy indicated clear ear canals and visualization of the tympanic membrane bilaterally. Right TM perforation noted    New ear mold impression taken without incidence. Sent ear mold and previous swim mold to [facility] for remake under 90 day remake warranty.    Warranty: Patient was informed of the 90 day no charge re-make policy.     Time Stamp: [id number]    Completed by:  Fay Florez, CCC-A  Licensed Senior Audiologist

## 2025-06-24 ASSESSMENT — ENCOUNTER SYMPTOMS
DYSURIA: 0
FLANK PAIN: 0
HEADACHES: 0
VOMITING: 0
BACK PAIN: 0
CONSTIPATION: 0
HEMATURIA: 0
SORE THROAT: 0
ANOREXIA: 0
FREQUENCY: 0
NAUSEA: 0
FEVER: 0
ABDOMINAL PAIN: 0
CHILLS: 0
DIARRHEA: 0

## 2025-06-30 ENCOUNTER — OFFICE VISIT (OUTPATIENT)
Dept: PRIMARY CARE | Facility: CLINIC | Age: 35
End: 2025-06-30
Payer: COMMERCIAL

## 2025-06-30 VITALS
BODY MASS INDEX: 29.59 KG/M2 | DIASTOLIC BLOOD PRESSURE: 78 MMHG | WEIGHT: 172.4 LBS | SYSTOLIC BLOOD PRESSURE: 110 MMHG | OXYGEN SATURATION: 99 % | RESPIRATION RATE: 18 BRPM | TEMPERATURE: 97.9 F | HEART RATE: 89 BPM

## 2025-06-30 DIAGNOSIS — J02.9 SORE THROAT: Primary | ICD-10-CM

## 2025-06-30 DIAGNOSIS — J02.9 ACUTE PHARYNGITIS, UNSPECIFIED ETIOLOGY: ICD-10-CM

## 2025-06-30 LAB
POC RAPID MONO: NEGATIVE
POC RAPID STREP: NEGATIVE

## 2025-06-30 PROCEDURE — 87880 STREP A ASSAY W/OPTIC: CPT | Performed by: NURSE PRACTITIONER

## 2025-06-30 PROCEDURE — 99213 OFFICE O/P EST LOW 20 MIN: CPT | Performed by: NURSE PRACTITIONER

## 2025-06-30 PROCEDURE — 86308 HETEROPHILE ANTIBODY SCREEN: CPT | Performed by: NURSE PRACTITIONER

## 2025-06-30 PROCEDURE — 1036F TOBACCO NON-USER: CPT | Performed by: NURSE PRACTITIONER

## 2025-06-30 RX ORDER — METHYLPREDNISOLONE 4 MG/1
TABLET ORAL
Qty: 21 TABLET | Refills: 0 | Status: SHIPPED | OUTPATIENT
Start: 2025-06-30 | End: 2025-07-06

## 2025-06-30 RX ORDER — AMOXICILLIN AND CLAVULANATE POTASSIUM 875; 125 MG/1; MG/1
875 TABLET, FILM COATED ORAL 2 TIMES DAILY
Qty: 20 TABLET | Refills: 0 | Status: SHIPPED | OUTPATIENT
Start: 2025-06-30 | End: 2025-07-10

## 2025-06-30 ASSESSMENT — ENCOUNTER SYMPTOMS
SORE THROAT: 1
RHINORRHEA: 0
NAUSEA: 0
APPETITE CHANGE: 0
VOMITING: 0
FEVER: 0
DIARRHEA: 0
MYALGIAS: 0
FATIGUE: 0

## 2025-06-30 NOTE — PROGRESS NOTES
Patient went to the urgent care on 6/15/25 for pharyngitis. She tested negative for strep and was put on keflex and 7 days of 20mg of prednisone. Patient says that she improved after taking the medications but her symptoms returned after she was done with the medications. She has seen ENT for this in the past. She is also being worked up for Sjogren's. Patient denies any chance of pregnancy.          Review of Systems   Constitutional:  Negative for appetite change, fatigue and fever.   HENT:  Positive for sore throat. Negative for congestion and rhinorrhea.    Gastrointestinal:  Negative for diarrhea, nausea and vomiting.   Musculoskeletal:  Negative for myalgias.     Objective   /78   Pulse 89   Temp 36.6 °C (97.9 °F) (Temporal)   Resp 18   Wt 78.2 kg (172 lb 6.4 oz)   SpO2 99%   BMI 29.59 kg/m²     Physical Exam  Vitals reviewed.   Constitutional:       General: She is not in acute distress.     Appearance: Normal appearance. She is not toxic-appearing.   HENT:      Head: Atraumatic.      Right Ear: A middle ear effusion is present. Tympanic membrane is erythematous.      Left Ear: Tympanic membrane, ear canal and external ear normal.      Nose: Nose normal.      Mouth/Throat:      Pharynx: Posterior oropharyngeal erythema present. No oropharyngeal exudate.      Comments: Tonsils +2, uvula midline  Cardiovascular:      Rate and Rhythm: Normal rate and regular rhythm.      Heart sounds: Normal heart sounds. No murmur heard.  Pulmonary:      Effort: Pulmonary effort is normal.      Breath sounds: Normal breath sounds. No wheezing or rhonchi.   Musculoskeletal:         General: Normal range of motion.   Skin:     General: Skin is warm and dry.   Neurological:      General: No focal deficit present.      Mental Status: She is alert.     Assessment/Plan   Problem List Items Addressed This Visit    None  Visit Diagnoses         Codes      Sore throat    -  Primary J02.9    Relevant Orders    POCT rapid  strep A manually resulted (Completed)    POCT Infectious mononucleosis antibody manually resulted (Completed)    Group A Streptococcus, PCR      Acute pharyngitis, unspecified etiology     J02.9    Relevant Medications    amoxicillin-clavulanate (Augmentin) 875-125 mg tablet    methylPREDNISolone (Medrol Dospak) 4 mg tablets        Rapid strep and mono are negative. Will get PCR strep testing done. Patient started on augmentin and medrol cr. Pt reminded to avoid other anti-inflammatories while on the steroids; she agreed. Will try to get pt a sooner appt with ENT for follow up on this. Advised ER for any difficulty swallowing, difficulty breathing, shortness of breath or new/concerning symptoms; she agreed.  staff to help pt make sooner appt.

## 2025-07-01 ENCOUNTER — CLINICAL SUPPORT (OUTPATIENT)
Dept: AUDIOLOGY | Facility: CLINIC | Age: 35
End: 2025-07-01
Payer: COMMERCIAL

## 2025-07-01 ENCOUNTER — TELEPHONE (OUTPATIENT)
Dept: PRIMARY CARE | Facility: CLINIC | Age: 35
End: 2025-07-01

## 2025-07-01 ENCOUNTER — APPOINTMENT (OUTPATIENT)
Dept: OBSTETRICS AND GYNECOLOGY | Facility: CLINIC | Age: 35
End: 2025-07-01
Payer: COMMERCIAL

## 2025-07-01 VITALS
SYSTOLIC BLOOD PRESSURE: 116 MMHG | HEIGHT: 64 IN | DIASTOLIC BLOOD PRESSURE: 62 MMHG | WEIGHT: 171 LBS | BODY MASS INDEX: 29.19 KG/M2

## 2025-07-01 DIAGNOSIS — R21 VULVAR RASH: Primary | ICD-10-CM

## 2025-07-01 DIAGNOSIS — H72.91 PERFORATION OF RIGHT TYMPANIC MEMBRANE: Primary | ICD-10-CM

## 2025-07-01 DIAGNOSIS — Z01.411 ENCOUNTER FOR GYNECOLOGICAL EXAMINATION WITH ABNORMAL FINDING: ICD-10-CM

## 2025-07-01 DIAGNOSIS — Z12.4 CERVICAL CANCER SCREENING: ICD-10-CM

## 2025-07-01 LAB — S PYO DNA THROAT QL NAA+PROBE: NOT DETECTED

## 2025-07-01 PROCEDURE — 1036F TOBACCO NON-USER: CPT | Performed by: STUDENT IN AN ORGANIZED HEALTH CARE EDUCATION/TRAINING PROGRAM

## 2025-07-01 PROCEDURE — 99459 PELVIC EXAMINATION: CPT | Performed by: STUDENT IN AN ORGANIZED HEALTH CARE EDUCATION/TRAINING PROGRAM

## 2025-07-01 PROCEDURE — 99395 PREV VISIT EST AGE 18-39: CPT | Performed by: STUDENT IN AN ORGANIZED HEALTH CARE EDUCATION/TRAINING PROGRAM

## 2025-07-01 PROCEDURE — 3008F BODY MASS INDEX DOCD: CPT | Performed by: STUDENT IN AN ORGANIZED HEALTH CARE EDUCATION/TRAINING PROGRAM

## 2025-07-01 PROCEDURE — 87626 HPV SEP HI-RSK TYP&POOL RSLT: CPT

## 2025-07-01 PROCEDURE — 99214 OFFICE O/P EST MOD 30 MIN: CPT | Performed by: STUDENT IN AN ORGANIZED HEALTH CARE EDUCATION/TRAINING PROGRAM

## 2025-07-01 RX ORDER — CLOBETASOL PROPIONATE 0.5 MG/G
OINTMENT TOPICAL 2 TIMES DAILY
Qty: 30 G | Refills: 3 | Status: SHIPPED | OUTPATIENT
Start: 2025-07-01

## 2025-07-01 RX ORDER — FLUCONAZOLE 150 MG/1
150 TABLET ORAL
Qty: 2 TABLET | Refills: 0 | Status: SHIPPED | OUTPATIENT
Start: 2025-07-01 | End: 2025-07-05

## 2025-07-01 ASSESSMENT — PAIN - FUNCTIONAL ASSESSMENT: PAIN_FUNCTIONAL_ASSESSMENT: 0-10

## 2025-07-01 ASSESSMENT — PAIN SCALES - GENERAL
PAINLEVEL_OUTOF10: 0 - NO PAIN
PAINLEVEL_OUTOF10: 0-NO PAIN

## 2025-07-01 NOTE — PROGRESS NOTES
AUDIOLOGY EARMOLD FITTING      Name:  Fang Carvalho  :  1990  Age:  35 y.o.  Date of Visit: 25    History:  Reason for visit:  Ms. Carvalho is seen today for a hearing aid check.  Chief Complaint   Patient presents with    Follow-up     Swim mold fitting     History:  Fang Carvalho came in today to  her swim plug for the right ear. Her last swim plug was remade since it did not provide a good seal and water was leaking into her ear.    Evaluation/Assessment:   The swim plug was placed in her right ear. The fit of the swim plug looked very good.     Fang practiced putting the swim plug in/out of her ear. She stated that fit was good and felt more secure than her old swim plug.     She was told to try the swim plug and see if it seals correctly. If the swim plug is not sealing well, call us back to have the swim plug remade. Counseled that she may notice some slight water in the vinay bowl area, that could be dried with a towel if needed.     Recommendations:  Call if any issues occur to schedule a ear mold impression.  Remake swim plug if not providing a good seal.    Time: 0388-7814 am    Yessica Smith M.S.  Audiology Extern    Supervisor:  Fay Stinson., CCC-A

## 2025-07-01 NOTE — TELEPHONE ENCOUNTER
Spoke to patient and relayed negative strep results. Pt is feeling better today. She will continue the plan of care and follow up as needed

## 2025-07-01 NOTE — PROGRESS NOTES
"Fang Carvalho is a 35 y.o.  female who is here for a routine exam.     Subjective     Concerns today:     Vulvar rash- itching and peeling, has had for a few weeks. Has been undergoing testing for autoimmune issues - history of juvenile idiopathic arthritis, now concerns for psoriasis/psoriatic arthritis. Used hydrocortisone without sustained relief. Reports similar rash in her ears treated with otic steroid.    OB/Gyn History:  Menstrual cycle concerns: light spotting occ- no concerns  Sexual Health Concerns: none  Current contraception: LNG IUD placed 2024    Preventative:  Mammogram: age 41   Last Colonoscopy: age 46   DM Screening: n/a  HPV vaccination: counseled  Exercise: physical therapy for knees- strength. Walking  Diet: no particular  Seat Belt Use: always    Social History:  Living Situation: lives with daughter and   School/Employment: works as an , [employer] of [employer] out of [place]  Safe at Home?: Yes  Depression Screen/Mood concerns: No- Bipolar disorder- managed well without current concerns    Personal medical and surgical history, Family history (specifically breast, ovarian, colon cancer), OB/GYN history and Substance use reviewed and updated in chart.   Pap history reviewed.         Objective   /62   Ht 1.626 m (5' 4\")   Wt 77.6 kg (171 lb)   BMI 29.35 kg/m²   Physical Exam  Vitals reviewed. Exam conducted with a chaperone present.   Constitutional:       Appearance: Normal appearance.   HENT:      Head: Normocephalic.   Cardiovascular:      Rate and Rhythm: Normal rate and regular rhythm.   Pulmonary:      Effort: Pulmonary effort is normal. No respiratory distress.   Chest:   Breasts:     Right: Normal. No swelling, mass or skin change.      Left: Normal. No swelling, mass or skin change.   Abdominal:      General: There is no distension.      Palpations: Abdomen is soft. There is no mass.      Tenderness: There is no abdominal tenderness. " There is no guarding or rebound.   Genitourinary:         Comments: Normal appearing external female genitalia. Vulva with erythematous rash as diagrammed. Vaginal mucosa normal appearing without lesions. Cervix without lesions. IUD strings present at os.  Lymphadenopathy:      Upper Body:      Right upper body: No supraclavicular, axillary or pectoral adenopathy.      Left upper body: No supraclavicular, axillary or pectoral adenopathy.   Skin:     General: Skin is warm and dry.   Neurological:      General: No focal deficit present.      Mental Status: She is alert.   Psychiatric:         Mood and Affect: Mood normal.         Behavior: Behavior normal.         Thought Content: Thought content normal.         Judgment: Judgment normal.             Assessment & Plan  Cervical cancer screening    Orders:    THINPREP PAP TEST    Vulvar rash  Exam with erythematous rash with possible yeast component  Recommend treatment for vulvar candidiasis- diflucan ordered  Topical steroid for symptom relief and to treat any non-yeast component  If not improved in 10-14 days, to notify clinic to consider biopsy  Orders:    clobetasol (Temovate) 0.05 % ointment; Apply topically 2 times a day.    fluconazole (Diflucan) 150 mg tablet; Take 1 tablet (150 mg) by mouth every 3rd day for 2 doses. Repeat in 7 days if symptoms persist.    Encounter for gynecological examination with abnormal finding  - Reviewed healthy lifestyle including well rounded diet and exercise (moderate intensity 150min/week; high intensity 75min/week)  - Immunizations: HPV counseling provided  - Pap collected, will follow up results with patient via myChart or mail  - STI testing declined         David Paiz MD  , Obstetrics and Gynecology  [facility]

## 2025-07-01 NOTE — TELEPHONE ENCOUNTER
----- Message from David YUAN sent at 7/1/2025 12:07 PM EDT -----  RESCHEDULED THIS FOR JULY 23RD AT 2:00 PM, LEFT PATIENT A VOICEMAIL.  ----- Message -----  From: CYNTHIA Serrano  Sent: 6/30/2025  12:46 PM EDT  To: David Rodriguez    Can we call Nurse Practitioner Gary's office and get pt in sooner if possible? Phone number is [phone]. Let me and the patient both know. Thank you! She is following up for tonsil enlargement

## 2025-07-14 ENCOUNTER — APPOINTMENT (OUTPATIENT)
Dept: RHEUMATOLOGY | Facility: CLINIC | Age: 35
End: 2025-07-14
Payer: COMMERCIAL

## 2025-07-16 LAB
CYTOLOGY CMNT CVX/VAG CYTO-IMP: NORMAL
HPV HR 12 DNA GENITAL QL NAA+PROBE: NEGATIVE
HPV HR GENOTYPES PNL CVX NAA+PROBE: NEGATIVE
HPV16 DNA SPEC QL NAA+PROBE: NEGATIVE
HPV18 DNA SPEC QL NAA+PROBE: NEGATIVE
LAB AP HPV GENOTYPE QUESTION: YES
LAB AP HPV HR: NORMAL
LABORATORY COMMENT REPORT: NORMAL
PATH REPORT.TOTAL CANCER: NORMAL

## 2025-07-21 ENCOUNTER — APPOINTMENT (OUTPATIENT)
Dept: RHEUMATOLOGY | Facility: CLINIC | Age: 35
End: 2025-07-21
Payer: COMMERCIAL

## 2025-07-21 VITALS
DIASTOLIC BLOOD PRESSURE: 70 MMHG | WEIGHT: 172 LBS | HEART RATE: 71 BPM | SYSTOLIC BLOOD PRESSURE: 108 MMHG | TEMPERATURE: 99.1 F | BODY MASS INDEX: 29.52 KG/M2

## 2025-07-21 DIAGNOSIS — M35.01 SJOGREN'S SYNDROME WITH KERATOCONJUNCTIVITIS SICCA: ICD-10-CM

## 2025-07-21 DIAGNOSIS — M08.80 JIA (JUVENILE IDIOPATHIC ARTHRITIS) (MULTI): ICD-10-CM

## 2025-07-21 DIAGNOSIS — M35.01 SJOGREN'S SYNDROME WITH KERATOCONJUNCTIVITIS SICCA: Primary | ICD-10-CM

## 2025-07-21 DIAGNOSIS — M25.50 ARTHRALGIA, UNSPECIFIED JOINT: ICD-10-CM

## 2025-07-21 PROCEDURE — 1036F TOBACCO NON-USER: CPT | Performed by: INTERNAL MEDICINE

## 2025-07-21 PROCEDURE — 99214 OFFICE O/P EST MOD 30 MIN: CPT | Performed by: INTERNAL MEDICINE

## 2025-07-21 RX ORDER — PILOCARPINE HYDROCHLORIDE 5 MG/1
5 TABLET, FILM COATED ORAL 3 TIMES DAILY
Qty: 90 TABLET | Refills: 11 | Status: SHIPPED | OUTPATIENT
Start: 2025-07-21 | End: 2025-07-21 | Stop reason: SDUPTHER

## 2025-07-21 RX ORDER — PILOCARPINE HYDROCHLORIDE 5 MG/1
5 TABLET, FILM COATED ORAL 3 TIMES DAILY
Qty: 90 TABLET | Refills: 11 | Status: SHIPPED | OUTPATIENT
Start: 2025-07-21 | End: 2026-07-21

## 2025-07-21 ASSESSMENT — PATIENT HEALTH QUESTIONNAIRE - PHQ9
1. LITTLE INTEREST OR PLEASURE IN DOING THINGS: NOT AT ALL
2. FEELING DOWN, DEPRESSED OR HOPELESS: NOT AT ALL
SUM OF ALL RESPONSES TO PHQ9 QUESTIONS 1 AND 2: 0

## 2025-07-21 ASSESSMENT — PAIN SCALES - GENERAL: PAINLEVEL_OUTOF10: 0-NO PAIN

## 2025-07-21 NOTE — PROGRESS NOTES
Chart reviewed   Hx of RENE followed by Dr. Edmonds years ago and more recently dx and tx for AS.   She was on MTX, humira and then Orencia in the past  She is currently on Simponi for the last 4 years and doing well for AS (+IBP HLA B27+)  Interestingly she has had some skin manifestations angular chelitis, dermatitis / psoriasform post ear area  She currently works as a   Hx of bipolar    Saw Niki who diagnosed her dermatis. She had a biopsy in the past which was inconclusive for psoriasis.  Had MRI of the the SI joints which was negative for sacroillitis.  FINAL DIAGNOSIS   Lower lip minor salivary gland biopsy:  -- Non-specific chronic sialadenitis, see note.   -- Focus score = 0.8, see note.    Note: Sections show adequate sample for pathology scoring comprised of 10 mm²  acinar surface area. Multiple salivary gland lobules show a patchy and diffuse lymphoplasmacytic infiltrate and mild chronic changes.  Sections are examined for any significant (ie. > 50 grouped) lymphocyte predominant foci adjacent to normal-appearing mucinous acini. There are 2 foci present, resulting in a focus score of 0.8. A focus score less than 1 does not provide histologic support for Sjogren syndrome, however, does not exclude the diagnosis. Correlation with additional clinical and serologic criteria used to diagnose Sjogren syndrome is advised.      :  Dr. Clarence Miller          CHIEF COMPLAINT: Follow up of RENE and Sjogren's.     HPI: At today's visit, the patient reports feeling fine. Continues to have dry mouth. Has morning stiffness < 30 minutes. Has no joint swelling or pain. Her knee joints flared when she increased the interval of Simponi to 6 weeks.   No rash or numbness or tingling. No Raynaud's.       Patient Active Problem List   Diagnosis    Ankylosing spondylitis    Anxiety disorder    Bipolar II disorder, severe, depressed, with mixed features, in full remission (Multi)    Gastroesophageal reflux  disease without esophagitis    HLA B27 positive    High risk pregnancy, antepartum (HHS-HCC)    PCOS (polycystic ovarian syndrome)    Polyarticular juvenile idiopathic arthritis (Multi)    Vitamin D deficiency, unspecified    Rubella non-immune status, antepartum (HHS-HCC)    Chronic low back pain    Routine postpartum follow-up    Encounter for counseling regarding contraception    IUD (intrauterine device) in place    Fatigue         Past Medical History:   Diagnosis Date    ADHD (attention deficit hyperactivity disorder) 1/1/2019    Ankylosing spondylitis of site in spine (Multi)     Arthritis 1/1/2002    Bipolar disorder, unspecified (Multi) 01/05/2023    Bipolar 1 disorder    Eczema 3/1/2023    GERD without esophagitis     HL (hearing loss) 9/7/2024    Hyperthyroidism complicating pregnancy (HHS-HCC)     Long term (current) use of immunosuppressive biologic 12/30/2015    Adalimumab (Humira) long-term use    Osteoarthritis     Polycystic ovary syndrome 2/1/2022    Raynaud's syndrome     Rheumatoid arthritis 1/1/2001    Tendinitis             Past Surgical History:   Procedure Laterality Date    FEMUR FRACTURE SURGERY      TYMPANOSTOMY TUBE PLACEMENT  1994       Allergies   Allergen Reactions    Biaxin [Clarithromycin] Psychosis       Medication Documentation Review Audit       Reviewed by David Paiz MD (Physician) on 07/01/25 at 1146      Medication Order Taking? Sig Documenting Provider Last Dose Status   amoxicillin-clavulanate (Augmentin) 875-125 mg tablet [id number]  Take 1 tablet by mouth 2 times a day for 10 days. Aubrie Meyer, APRN-CNP  Active   amphetamine-dextroamphetamine XR (Adderall XR) 10 mg 24 hr capsule [id number]  Take 1 capsule (10 mg) by mouth once daily in the morning. Do not crush or chew. Historical Provider, MD  Active   cariprazine (Vraylar) 3 mg capsule [id number] No Take 1 capsule (3 mg) by mouth once daily. Historical Provider, MD Taking Active     Discontinued 06/30/25 1255    clobetasol (Temovate) 0.05 % ointment [id number]  Apply topically 2 times a day. David Paiz MD  Active   fluconazole (Diflucan) 150 mg tablet [id number]  Take 1 tablet (150 mg) by mouth every 3rd day for 2 doses. Repeat in 7 days if symptoms persist. David Paiz MD  Active    Patient not taking:   Discontinued 06/30/25 1250   hydrocortisone 2.5 % ointment [id number]   Historical Provider, MD  Active   ibuprofen 200 mg tablet [id number]  Take 1 tablet (200 mg) by mouth every 6 hours if needed for mild pain (1 - 3). Historical Provider, MD  Active   levonorgestrel (Mirena) 21 mcg/24 hours (8 yrs) 52 mg IUD [id number] No 52 mg by intrauterine route 1 time. IN 2/12/24  OUT 2/32 Historical Provider, MD Taking Active   methylPREDNISolone (Medrol Dospak) 4 mg tablets [id number]  Take as directed on package. CYNTHIA Serrano  Active   omeprazole (PriLOSEC) 40 mg DR capsule [id number]  Take daily before breakfast or 30 minutes before your biggest meal. CYNTHIA Pitts  Active   Simponi 100 mg/mL syringe [id number]  Inject 1 mL (100 mg) under the skin every 30 (thirty) days. Renee Brenner MD  Active                        Family History   Problem Relation Name Age of Onset    Skin cancer Mother Neil Strange     Hypertension Mother Neil Strange     Cancer Mother Neil Strange     Ankylosing spondylitis Father Neil Strange     Hypertension Father Neil Strange     Skin cancer Father Neil Strange     Arthritis Father Neil Strange     Cancer Father Neil Strange     Skin cancer Sister      Breast cancer Mother's Sister  60 - 69    Ovarian cancer Other Gradnfather's sister 70 - 79    Colon cancer Neg Hx            Social History     Tobacco Use    Smoking status: Never     Passive exposure: Never    Smokeless tobacco: Never   Vaping Use    Vaping status: Never Used   Substance Use Topics    Alcohol use: Yes     Alcohol/week: 1.0 standard drink of alcohol     Types: 1 Glasses of wine per week     Comment: 1x week     Drug use: Never         Review of Systems    REVIEW OF SYSTEMS:  Constitutional: Mild fatigue  Skin:  No rash or psoriasis or photosensitvity  Head: No headache, oral ulcers or hair loss  Neck: Has difficulty swallowing but choking  Eyes: No dry eyes or iritis  Mouth: Has dry mouth  Pulmonary: No wheezing, pleurisy or SOB  Cardiovascular: No chest pain or palpitations  Gastrointestinal: Heartburn with NSAIds  Endocrine: No Raynaud's   Musculoskeletal: As H/P        PHYSICAL EXAM:  Blood pressure 108/70, pulse 71, temperature 37.3 °C (99.1 °F), temperature source Oral, weight 78 kg (172 lb), not currently breastfeeding.    not currently breastfeeding.  General - NAD, sitting up in chair, well-groomed, pleasant, AAOx3  Head: Normocephalic, atraumatic  Eyes - PERRLA, EOMI. No conjunctiva injection.   Mouth/ENT -  Absent salivary pool.  Skin warm and dry. No erythema on bilateral cheeks    EXAMJOINTDETAILED,  No joint swelling or tenderness  Hips: Full ROM.  No malalignment.  Knees:  Full ROM, without pain, no swelling, warmth or point tenderness. No joint line tenderness, has pes anserine tenderness.  Ankles: Full ROM, without pain, swelling, warmth or tenderness.  Back: No restriction of movement     DATE OF EXAM: Jan 16 2024  1:10PM      [facility]   [id number]  -  XR KNEE SURVEY 1V  AP ARABELLA  / ACCESSION #  [id number]     PROCEDURE REASON: Ankylosing spondylitis of sacral region (HCC)          * * * * Physician Interpretation * * * *      EXAMINATION:  XR KNEE SURVEY 1V  AP ARABELLA     HISTORY:   Akylosing spondylitis, patient states having bilateral knee   pain since late November, pain in left knee worse than pain in right knee    Ankylosing spondylitis of sacral region (HCC)   .     TECHNIQUE:  XR KNEE SURVEY 1V  AP ARABELLA      Laterality:  NOT APPLICABLE      Number of different views (projections): 1      M:  XB_1     COMPARISON:     RESULT:     Mild narrowing of the medial compartments bilaterally and minimal   narrowing of the  lateral compartments. Small marginal osteophytes   medially bilaterally. Partially seen right femoral intramedullary dinesh.     MR sacrum coccyx wo IV contrast 01/21/2025 [id number] Final       Assessment/Plan: 35 yr old with H/O RENE with positive PEG in a titer 1:1280. Continue Simponi every 4 weeks.  Sjogren's syndrome: Use Salagen. Labs ordered.     Reviewed and approved by KEI OSORIO on 7/21/25 at 11:17 AM.      Kei Osorio MD

## 2025-07-23 ENCOUNTER — LAB (OUTPATIENT)
Dept: LAB | Facility: HOSPITAL | Age: 35
End: 2025-07-23
Payer: COMMERCIAL

## 2025-07-23 ENCOUNTER — APPOINTMENT (OUTPATIENT)
Facility: CLINIC | Age: 35
End: 2025-07-23
Payer: COMMERCIAL

## 2025-07-23 VITALS
DIASTOLIC BLOOD PRESSURE: 82 MMHG | WEIGHT: 172 LBS | SYSTOLIC BLOOD PRESSURE: 115 MMHG | BODY MASS INDEX: 29.37 KG/M2 | HEIGHT: 64 IN

## 2025-07-23 DIAGNOSIS — H61.23 BILATERAL IMPACTED CERUMEN: ICD-10-CM

## 2025-07-23 DIAGNOSIS — R49.0 DYSPHONIA: ICD-10-CM

## 2025-07-23 DIAGNOSIS — R09.A2 GLOBUS SENSATION: ICD-10-CM

## 2025-07-23 DIAGNOSIS — K21.9 LARYNGOPHARYNGEAL REFLUX (LPR): Primary | ICD-10-CM

## 2025-07-23 DIAGNOSIS — M35.01 SJOGREN SYNDROME WITH KERATOCONJUNCTIVITIS: Primary | ICD-10-CM

## 2025-07-23 PROCEDURE — 36415 COLL VENOUS BLD VENIPUNCTURE: CPT

## 2025-07-23 PROCEDURE — 31575 DIAGNOSTIC LARYNGOSCOPY: CPT

## 2025-07-23 PROCEDURE — 84155 ASSAY OF PROTEIN SERUM: CPT

## 2025-07-23 PROCEDURE — 99214 OFFICE O/P EST MOD 30 MIN: CPT

## 2025-07-23 PROCEDURE — 1036F TOBACCO NON-USER: CPT

## 2025-07-23 PROCEDURE — 69210 REMOVE IMPACTED EAR WAX UNI: CPT

## 2025-07-23 PROCEDURE — 3008F BODY MASS INDEX DOCD: CPT

## 2025-07-23 PROCEDURE — 84165 PROTEIN E-PHORESIS SERUM: CPT

## 2025-07-23 NOTE — PROGRESS NOTES
Patient ID: Fang Carvalho is a 35 y.o. female who presents for subsequent evaluation of throat discomfort.       PROVIDER IMPRESSIONS:  DIAGNOSES/PROBLEMS:  -Laryngopharyngeal reflux   -Globus sensation  -Dysphonia  -Bilateral cerumen impaction    ASSESSMENT:   Fang Carvalho is a pleasant 35 y.o. female who presents for subsequent evaluation of right-sided globus sensation and voice dysphonia. Based on the clinical information provided, symptoms and clinical exam findings are consistent with bilateral cerumen impaction and poorly controlled LPR vs. Right arytenoid nodule/mass. Using appropriate instrumentation, impacted cerumen was successfully removed from the EAC bilaterally. Flexible laryngoscopy exam performed today as detailed below showing reflux-related changed and significant right arytenoid edema/asymmetry. Patient offered reassurance and counseling on the current clinical findings and management recommendations. We discussed that given her recent URI and oral steroid courses, her symptoms could be consistent with exacerbation of reflux. I would like her to see laryngology to further evaluate for possible laryngeal mass/nodule.     PLAN:  I recommended referral to my laryngology physician partner Dr. Campos for further E/M of right laryngeal edema vs. Mass. Referral e-submitted and patient scheduled for follow-up.   I recommended patient continues with consistent adherence to PPI therapy.  Follow-up: Patient may schedule for follow-up with me as needed. Patient is agreeable to plan. All questions answered to patient's satisfaction.     Subjective   HPI: Fang Carvalho is a 35 y.o. female who presents for a follow-up evaluation for right-sided globus sensation and voice hoarseness. Reports that in June 2025 she came down with URI with sore throat and received p.o. antibiotics and p.o steroids with symptom benefit. She noticed recurrence of sore throat symptoms 3 days following antibiotic/steroid course  completion and received a second round of p.o. antibiotics and p.o steroids which she has completed. She denies current symptoms of throat pain. She does continue to notice a sensation that something is stuck in the back of her throat on the right side. She has been frequently trying to clear her throat with minimal symptom benefit. Patient has been consistent with recommended PPI therapy  with symptom benefit.     RECALL 3/25/25:  HPI: Fang Carvalho is a 34 y.o. female who presents for subsequent evaluation of throat. She was previously evaluate by my ENT partner Fang Hoff CNP on 1/9/25 for throat symptoms and received p.o. omeprazole 40 mg daily to treat LPR. Patient has been consistent with PPI therapy and reports significant improvement in symptoms of chronic hoarseness, right-sided throat pain, and globus sensation. States that voice hoarseness symptoms are improved by 95% over the past 2 months. She rates current throat pain a 0/10 on numeric pain scale. She has noticed a decrease in frequency of heartburn/acid reflux. She notes she is able to sing to her daughter now because of improved voice hoarseness. Denies the presence of hemoptysis, dysphagia, odynophagia, dyspnea/SOB, cough or bad breath.   ASSESSMENT:   Fang Carvalho is a pleasant 34 y.o. female who presents for subsequent evaluation of chronic acquired dysphonia, right-sided throat pain, and globus sensation. Patient endorses significant symptom improvement with PPI therapy. Based on the clinical information provided, symptoms and clinical exam findings are consistent with well-controlled LPR. Flexible laryngoscopy exam performed today with a brief view of larynx that showed minimal evidence of reflux-related changes as detailed below. Patient offered reassurance and counseling regarding the findings today.  PLAN:  I counseled the patient extensively on dietary and lifestyle modifications to improve LPR. I recommended limiting or avoiding  consumption of spicy, fatty, caffeinated, carbonated, acidic, and citrus foods or beverages that may aggravate reflux. I also recommended limiting or avoiding consumption of chocolate, peppermint, and alcohol.  I recommended eating smaller, more frequent meals and to avoid eating or drinking 2-3 hours before lying down. The patient was also apprised that elevating the head of the bed may help. The patient was counseled on avoiding NSAIDS as much as possible, and if taking them be sure to do so with a meal.   I recommended continuing PPI therapy with p.o. omeprazole 40 mg DR capsule daily for LPR. Patient was counseled on the indications, possible side effects, and proper administration of medication. Prescription refill was e-submitted to the patient's preferred pharmacy (30 capsules x 11 refills).  Follow-up: Patient may schedule for follow-up in 1 year for routine annual surveillance of PPI therapy treatment outcomes. She may follow-up sooner if needed. Patient is agreeable to this plan, all questions were answered to patient's satisfaction.      PATIENT HISTORY:  Medical History[1]   Surgical History[2]   RX Allergies[3]   Current Medications[4]   Tobacco Use: Low Risk  (7/23/2025)    Patient History     Smoking Tobacco Use: Never     Smokeless Tobacco Use: Never     Passive Exposure: Never      Alcohol Use: Not At Risk (1/9/2025)    AUDIT-C     Frequency of Alcohol Consumption: 2-4 times a month     Average Number of Drinks: 1 or 2     Frequency of Binge Drinking: Never      Social History     Substance and Sexual Activity   Drug Use Never        Review of Systems   All other systems negative.     Objective   PHYSICAL EXAM:   General appearance: Appears well, well-nourished, well groomed. No acute distress.   Constitutional: No fever, chills, weight loss or weight gain.  Communication: Normal communication  Psychiatric: Oriented to person, place and time. Normal mood and affect.  Neurologic: Cranial nerves  II-XII grossly intact and symmetric bilaterally.  Cardiovascular: Examination of peripheral vascular system shows no clubbing or cyanosis.  Respiratory: No respiratory distress increased work of breathing. Inspection of the chest with symmetric chest expansion and normal respiratory effort.  Skin: No head and neck rashes.  Head: Normocephalic. Atraumatic with no masses, lesions or scarring.  Face: Normal symmetry. No scars or deformities.  Eyes: Conjunctiva not edematous or erythematous. PERRLA  Neck: Supple and symmetric, trachea midline. Lymph nodes with no adenopathy.  Head: Normocephalic. Atraumatic with no masses, lesions or scarring.  Eyes: PERRL, EOMI, Conjunctiva is clear. No nystagmus.  Nose: External inspection of nose: No nasal lesions, lacerations or scars. No tenderness on frontal or maxillary sinus palpation. Anterior rhinoscopy with limited visualization past the inferior turbinates: Septum is midline.  No septal perforation or lesions. No septal hematoma/ seroma.  No signs of bleeding.  No evidence of intranasal polyps.  Inferior turbinates appear healthy.    Throat:  Floor of mouth is clear, no masses.  Tongue appears normal, no lesions or masses. Gums, gingiva, buccal mucosa appear pink and moist, no lesions. Teeth are in intact.  No obvious dental infections.  Peritonsillar regions appear symmetric without swelling. Hard and soft palate appear normal, no obvious cleft. Uvula is midline.  Left Tonsil -- 2.5+, no exudates.  Right Tonsil -- 2.5+, no exudates.  Oropharynx: No lesions. Retropharyngeal wall is flat.  No postnasal drip.  Salivary Glands: Symmetric bilaterally.  No palpable masses.  No evidence of acute infection or salivary stones.  TMJ: Normal, no trismus.  Right Ear: External inspection of ear with no deformity, scars, or masses. Mastoid is nontender. EAC is partially impacted with cerumen. Unable to visualize TM.   Left Ear: External inspection of ear with no deformity, scars, or  masses. Mastoid is nontender. EAC is completely impacted with cerumen. Unable to visualize TM.     EAR CLEANING PROCEDURE NOTE:  Indication: Cerumen impaction  Location: bilateral ear canals  Procedure Note: The procedure was performed by the provider.  Visualization Instrument: A microscope with a #5 speculum was placed in the ear canals to visualize the ear canal debris.  Ear Cleaning Instrument and Outcome: Using the 7 suction and alligator forceps, a large amount of dry, brown cerumen was removed from the impacted EAC(s).  Patient Status: The patient tolerated the procedure well.  Complications: There were no complications.  Post-Procedure/Microscopic Otologic Exam:  Right Ear--EAC is clear. TM is intact [TM perforation appears healed] with no sign of infection, effusion, or retraction.  No perforation seen. Auto insufflation visible under microscopy.  Left Ear-- EAC is clear. TM is intact with no sign of infection, effusion, or retraction.  No perforation seen. Auto insufflation visible under microscopy.       FLEXIBLE LARYNGOSCOPY/NASOPHARYNGOSCOPY PROCEDURE NOTE:   INDICATION: concern for LPR/throat follow-up evaluation  Due to a strong gag and inadequate visualization by mirror exam, flexible naso-laryngoscopy was performed.  Risks, benefits, alternatives, and expectations were discussed with patient and/or guardian prior to procedure and verbal consent to proceed was obtained.  Both nostrils were sprayed with a mixture of lidocaine 4% and topical nasal decongestant.  After a sufficient amount of time elapsed for mucosal anesthesia to take place, the flexible naso-laryngoscope was advanced into the nostril.  The following areas were visualized: Nasal passage, nasal septum, turbinates’, nasopharynx, adenoids, oropharynx, eustachian tube orifices and torus tubarius, lateral pharyngeal walls, soft palate, base of tongue, valleculae, epiglottis, laryngeal complex, vocal cords, arytenoids, subglottic and pyriform  sinuses visualized and examined for pathology.     The patient tolerated the procedure well and these structures were found to be normal except as follows: minimal postcricoid edema, mild Interarytenoid erythema, minimal/diffuse laryngeal edema, mild Laryngeal pachyderma. There is asymmetric/significant edema of the right arytenoid structures.    Normal findings: Symmetric TVC movement with normal mobility on phonation bilaterally. Complete glottic closure on phonation. No sign of TVC nodules/granulomas. The visualized laryngeal structures showed no sign of masses, purulence, bleeding, ulceration or lesions. No sign of pyriform sinus mucus accumulation.     Nilda Vega, APRN-CNP        [1]   Past Medical History:  Diagnosis Date    ADHD (attention deficit hyperactivity disorder) 1/1/2019    Ankylosing spondylitis of site in spine (Multi)     Arthritis 1/1/2002    Bipolar disorder, unspecified (Multi) 01/05/2023    Bipolar 1 disorder    Eczema 3/1/2023    GERD without esophagitis     HL (hearing loss) 9/7/2024    Hyperthyroidism complicating pregnancy ([facility])     Long term (current) use of immunosuppressive biologic 12/30/2015    Adalimumab (Humira) long-term use    Osteoarthritis     Polycystic ovary syndrome 2/1/2022    Raynaud's syndrome     Rheumatoid arthritis 1/1/2001    Tendinitis    [2]   Past Surgical History:  Procedure Laterality Date    FEMUR FRACTURE SURGERY      TYMPANOSTOMY TUBE PLACEMENT  1994   [3]   Allergies  Allergen Reactions    Biaxin [Clarithromycin] Psychosis   [4]   Current Outpatient Medications:     amphetamine-dextroamphetamine XR (Adderall XR) 10 mg 24 hr capsule, Take 1 capsule (10 mg) by mouth once daily in the morning. Do not crush or chew., Disp: , Rfl:     cariprazine (Vraylar) 3 mg capsule, Take 1 capsule (3 mg) by mouth once daily., Disp: , Rfl:     clobetasol (Temovate) 0.05 % ointment, Apply topically 2 times a day., Disp: 30 g, Rfl: 3    hydrocortisone 2.5 % ointment,  , Disp: , Rfl:     ibuprofen 200 mg tablet, Take 1 tablet (200 mg) by mouth every 6 hours if needed for mild pain (1 - 3)., Disp: , Rfl:     levonorgestrel (Mirena) 21 mcg/24 hours (8 yrs) 52 mg IUD, 52 mg by intrauterine route 1 time. IN 2/12/24 OUT 2/32, Disp: , Rfl:     omeprazole (PriLOSEC) 40 mg DR capsule, Take daily before breakfast or 30 minutes before your biggest meal., Disp: 30 capsule, Rfl: 11    pilocarpine (Salagen, pilocarpine,) 5 mg tablet, Take 1 tablet (5 mg) by mouth 3 times a day., Disp: 90 tablet, Rfl: 11    Simponi 100 mg/mL syringe, Inject 1 mL (100 mg) under the skin every 30 (thirty) days., Disp: 4 mL, Rfl: 1

## 2025-07-24 LAB
ALBUMIN SERPL-MCNC: 4.8 G/DL (ref 3.6–5.1)
ALP SERPL-CCNC: 53 U/L (ref 31–125)
ALT SERPL-CCNC: 14 U/L (ref 6–29)
ANION GAP SERPL CALCULATED.4IONS-SCNC: 10 MMOL/L (CALC) (ref 7–17)
AST SERPL-CCNC: 19 U/L (ref 10–30)
BASOPHILS # BLD AUTO: 23 CELLS/UL (ref 0–200)
BASOPHILS NFR BLD AUTO: 0.3 %
BILIRUB SERPL-MCNC: 0.4 MG/DL (ref 0.2–1.2)
BUN SERPL-MCNC: 10 MG/DL (ref 7–25)
C3 SERPL-MCNC: 137 MG/DL (ref 83–193)
C4 SERPL-MCNC: 37 MG/DL (ref 15–57)
CALCIUM SERPL-MCNC: 9.5 MG/DL (ref 8.6–10.2)
CHLORIDE SERPL-SCNC: 105 MMOL/L (ref 98–110)
CO2 SERPL-SCNC: 26 MMOL/L (ref 20–32)
CREAT SERPL-MCNC: 0.64 MG/DL (ref 0.5–0.97)
EGFRCR SERPLBLD CKD-EPI 2021: 119 ML/MIN/1.73M2
EOSINOPHIL # BLD AUTO: 70 CELLS/UL (ref 15–500)
EOSINOPHIL NFR BLD AUTO: 0.9 %
ERYTHROCYTE [DISTWIDTH] IN BLOOD BY AUTOMATED COUNT: 14.9 % (ref 11–15)
GLUCOSE SERPL-MCNC: 93 MG/DL (ref 65–139)
HCT VFR BLD AUTO: 43.7 % (ref 35–45)
HGB BLD-MCNC: 14 G/DL (ref 11.7–15.5)
LYMPHOCYTES # BLD AUTO: 3175 CELLS/UL (ref 850–3900)
LYMPHOCYTES NFR BLD AUTO: 40.7 %
MCH RBC QN AUTO: 28.5 PG (ref 27–33)
MCHC RBC AUTO-ENTMCNC: 32 G/DL (ref 32–36)
MCV RBC AUTO: 89 FL (ref 80–100)
MONOCYTES # BLD AUTO: 437 CELLS/UL (ref 200–950)
MONOCYTES NFR BLD AUTO: 5.6 %
NEUTROPHILS # BLD AUTO: 4095 CELLS/UL (ref 1500–7800)
NEUTROPHILS NFR BLD AUTO: 52.5 %
PLATELET # BLD AUTO: 218 THOUSAND/UL (ref 140–400)
PMV BLD REES-ECKER: 11 FL (ref 7.5–12.5)
POTASSIUM SERPL-SCNC: 4.3 MMOL/L (ref 3.5–5.3)
PROT SERPL-MCNC: 7.2 G/DL (ref 6.1–8.1)
PROT SERPL-MCNC: 7.2 G/DL (ref 6.4–8.2)
RBC # BLD AUTO: 4.91 MILLION/UL (ref 3.8–5.1)
SODIUM SERPL-SCNC: 141 MMOL/L (ref 135–146)
WBC # BLD AUTO: 7.8 THOUSAND/UL (ref 3.8–10.8)

## 2025-07-28 LAB
ALBUMIN: 4.4 G/DL (ref 3.4–5)
ALPHA 1 GLOBULIN: 0.3 G/DL (ref 0.2–0.6)
ALPHA 2 GLOBULIN: 0.6 G/DL (ref 0.4–1.1)
BETA GLOBULIN: 0.9 G/DL (ref 0.5–1.2)
GAMMA GLOBULIN: 1 G/DL (ref 0.5–1.4)
PATH REVIEW-SERUM PROTEIN ELECTROPHORESIS: NORMAL
PROTEIN ELECTROPHORESIS COMMENT: NORMAL

## 2025-08-20 ENCOUNTER — APPOINTMENT (OUTPATIENT)
Facility: CLINIC | Age: 35
End: 2025-08-20
Payer: COMMERCIAL

## 2026-01-26 ENCOUNTER — APPOINTMENT (OUTPATIENT)
Dept: RHEUMATOLOGY | Facility: CLINIC | Age: 36
End: 2026-01-26
Payer: COMMERCIAL

## 2026-02-02 ENCOUNTER — APPOINTMENT (OUTPATIENT)
Dept: RHEUMATOLOGY | Facility: CLINIC | Age: 36
End: 2026-02-02
Payer: COMMERCIAL

## 2026-03-24 ENCOUNTER — APPOINTMENT (OUTPATIENT)
Facility: CLINIC | Age: 36
End: 2026-03-24
Payer: COMMERCIAL

## 2026-07-02 ENCOUNTER — APPOINTMENT (OUTPATIENT)
Dept: OBSTETRICS AND GYNECOLOGY | Facility: CLINIC | Age: 36
End: 2026-07-02
Payer: COMMERCIAL